# Patient Record
Sex: FEMALE | Race: BLACK OR AFRICAN AMERICAN | NOT HISPANIC OR LATINO | Employment: OTHER | URBAN - METROPOLITAN AREA
[De-identification: names, ages, dates, MRNs, and addresses within clinical notes are randomized per-mention and may not be internally consistent; named-entity substitution may affect disease eponyms.]

---

## 2017-11-06 DIAGNOSIS — M25.531 RIGHT WRIST PAIN: Primary | ICD-10-CM

## 2017-11-08 ENCOUNTER — HOSPITAL ENCOUNTER (OUTPATIENT)
Dept: RADIOLOGY | Facility: HOSPITAL | Age: 62
Discharge: HOME | End: 2017-11-08
Attending: PHYSICIAN ASSISTANT
Payer: MEDICARE

## 2017-11-08 ENCOUNTER — OFFICE VISIT (OUTPATIENT)
Dept: ORTHOPEDICS | Facility: CLINIC | Age: 62
End: 2017-11-08
Payer: MEDICARE

## 2017-11-08 VITALS
BODY MASS INDEX: 26.51 KG/M2 | DIASTOLIC BLOOD PRESSURE: 80 MMHG | HEIGHT: 67 IN | WEIGHT: 168.88 LBS | RESPIRATION RATE: 14 BRPM | SYSTOLIC BLOOD PRESSURE: 114 MMHG | HEART RATE: 72 BPM

## 2017-11-08 DIAGNOSIS — M79.641 RIGHT HAND PAIN: ICD-10-CM

## 2017-11-08 DIAGNOSIS — M77.11 RIGHT LATERAL EPICONDYLITIS: ICD-10-CM

## 2017-11-08 DIAGNOSIS — M19.041 ARTHRITIS OF RIGHT HAND: ICD-10-CM

## 2017-11-08 DIAGNOSIS — M25.531 RIGHT WRIST PAIN: ICD-10-CM

## 2017-11-08 DIAGNOSIS — M77.11 RIGHT LATERAL EPICONDYLITIS: Primary | ICD-10-CM

## 2017-11-08 PROCEDURE — 99999 PR PBB SHADOW E&M-EST. PATIENT-LVL V: CPT | Mod: PBBFAC,,, | Performed by: PHYSICIAN ASSISTANT

## 2017-11-08 PROCEDURE — 73110 X-RAY EXAM OF WRIST: CPT | Mod: TC,PO,RT

## 2017-11-08 PROCEDURE — 99203 OFFICE O/P NEW LOW 30 MIN: CPT | Mod: S$GLB,,, | Performed by: PHYSICIAN ASSISTANT

## 2017-11-08 PROCEDURE — 73110 X-RAY EXAM OF WRIST: CPT | Mod: 26,RT,, | Performed by: RADIOLOGY

## 2017-11-08 RX ORDER — OXYCODONE AND ACETAMINOPHEN 5; 325 MG/1; MG/1
1 TABLET ORAL 2 TIMES DAILY
Refills: 0 | COMMUNITY
Start: 2017-11-02

## 2017-11-08 RX ORDER — DICLOFENAC POTASSIUM 25 MG/1
25 CAPSULE, LIQUID FILLED ORAL
COMMUNITY
End: 2019-08-29

## 2017-11-08 RX ORDER — FLUTICASONE PROPIONATE 50 MCG
2 SPRAY, SUSPENSION (ML) NASAL
COMMUNITY
Start: 2017-02-11 | End: 2018-02-11

## 2017-11-08 RX ORDER — MELOXICAM 15 MG/1
15 TABLET ORAL DAILY
Refills: 0 | COMMUNITY
Start: 2017-09-27

## 2017-11-08 RX ORDER — GABAPENTIN 300 MG/1
300 CAPSULE ORAL NIGHTLY PRN
COMMUNITY

## 2017-11-08 RX ORDER — MONTELUKAST SODIUM 10 MG/1
10 TABLET ORAL DAILY PRN
Refills: 0 | COMMUNITY
Start: 2017-09-27 | End: 2020-09-15

## 2017-11-08 RX ORDER — LISINOPRIL AND HYDROCHLOROTHIAZIDE 20; 25 MG/1; MG/1
1 TABLET ORAL
COMMUNITY
End: 2018-09-06 | Stop reason: SDUPTHER

## 2017-11-08 RX ORDER — ERGOCALCIFEROL 1.25 MG/1
50000 CAPSULE ORAL
COMMUNITY

## 2017-11-08 RX ORDER — CITALOPRAM 20 MG/1
20 TABLET, FILM COATED ORAL
COMMUNITY
End: 2019-08-29

## 2017-11-08 RX ORDER — BENZONATATE 200 MG/1
200 CAPSULE ORAL
COMMUNITY
End: 2018-09-12

## 2017-11-08 RX ORDER — METRONIDAZOLE 500 MG/1
500 TABLET ORAL 2 TIMES DAILY
Refills: 0 | COMMUNITY
Start: 2017-11-01 | End: 2018-09-06

## 2017-11-08 RX ORDER — IBUPROFEN 800 MG/1
800 TABLET ORAL
COMMUNITY
End: 2019-08-29

## 2017-11-08 RX ORDER — CYCLOBENZAPRINE HCL 10 MG
10 TABLET ORAL 3 TIMES DAILY PRN
COMMUNITY

## 2017-11-08 NOTE — LETTER
November 9, 2017      Harpreet Jones MD  [address] [facility]  [facility]  [place] [address]           [facility]  [address] [facility]  [place] [address]  Phone: [phone]  Fax: [phone]          Patient: Alea Terrazas   MR Number: [id number]   YOB: 1955   Date of Visit: 11/8/2017       Dear Dr. Harpreet Jones:    Thank you for referring Alea Terrazas to me for evaluation. Attached you will find relevant portions of my assessment and plan of care.    If you have questions, please do not hesitate to call me. I look forward to following Alea Terrazas along with you.    Sincerely,    Angie Duong PA-C    Enclosure  CC:  No Recipients    If you would like to receive this communication electronically, please contact [email or web address] or [phone] to request more information on [employer] access.    For providers and/or their staff who would like to refer a patient to [facility], please contact us through our one-stop-shop provider referral line, [facility], at [phone].    If you feel you have received this communication in error or would no longer like to receive these types of communications, please e-mail [email or web address]

## 2017-11-08 NOTE — PATIENT INSTRUCTIONS
Understanding Lateral Epicondylitis    Tendons are strong bands of tissue that connect muscles to bones. Lateral epicondylitis affects the tendons that connect muscles in the forearm to the lateral epicondyle. This is the bony knob on the outer side of the elbow. The condition occurs if the extensor tendons of the wrist become red and swollen (irritated). This can cause pain in the elbow, forearm, and wrist. Because the condition is sometimes caused by playing tennis, it is also known as tennis elbow.  How to say it  [id number] [id number]   What causes lateral epicondylitis?  The condition most often occurs because of overuse. This can be from any activity that repeatedly puts stress on the forearm extensor muscles or tendons and wrist. For instance, playing tennis, lifting weights, cutting meat, painting, and typing can all cause the condition. Wear and tear of the tendons from aging or an injury to the tendons can also cause the condition.  Symptoms of lateral epicondylitis  The most common symptom is pain. You may feel it on the outer side of the elbow and down the back of the forearm. It may be worse when moving or using the elbow, forearm, or wrist. You may also feel pain when gripping or lifting things.  Treatment for lateral epicondylitis  Treatments may include:  · Resting the elbow, forearm, and wrist. Youll need to avoid movements that can make your symptoms worse. You also may need to avoid certain sports and types of work for a time. This helps relieve symptoms and prevent further damage to the tendons.  · Changing the action that caused the problem. For instance, if the tendons were damaged from playing tennis, it may help to change your playing technique or use different equipment. This helps prevent further damage to the tendons.  · Using cold packs. Putting an ice pack on the injured area can help reduce pain and swelling.  · Taking pain medicines. Taking prescription or  over-the-counter pain medicines may help reduce pain and swelling.    · Wearing a brace. This helps reduce strain on the muscles and tendons in the forearm, which may relieve symptoms. It is very important to wear the brace properly.  · Doing exercises and physical therapy. These help improve strength and range of motion in the elbow, forearm, and wrist.  · Getting shots of medicine into the injured area. These may help relieve symptoms for a time.  · Having surgery. This may be an option if other treatments fail to relieve symptoms. In many cases, the surgeon removes the damaged tissue.  Possible complications of lateral epicondylitis  If the tendons involved dont heal properly, symptoms may return or get worse. To help prevent this, follow your treatment plan as directed.  When to call your healthcare provider  Call your healthcare provider right away if you have any of these:  · Fever of 100.4°F (38°C) or higher, or as directed  · Redness, swelling, or warmth in the elbow or forearm that gets worse  · Symptoms that dont get better with treatment, or get worse  · New symptoms   Date Last Reviewed: 3/10/2016  © 0359-9417 Issue. [address] [place] [address]. All rights reserved. This information is not intended as a substitute for professional medical care. Always follow your healthcare professional's instructions.        Wrist Flexion (Strength)     This exercise is written for your right elbow. Switch sides for your left elbow.  1. Sit in a chair next to a table. Rest your right forearm on the table. Hang your right wrist off the edge of the table, palm up. Hold a hand weight in your right hand. Your healthcare provider will tell you what size of hand weight to use.  2. Keep your forearm in place and bend your wrist upward to lift the weight. Hold for 5 seconds.  3. Slowly lower the hand weight back down.  4. Repeat 5 times, or as instructed.  Date Last Reviewed: 3/10/2016  ©  0284-1792 [employer]. [address] [place] [address]. All rights reserved. This information is not intended as a substitute for professional medical care. Always follow your healthcare professional's instructions.        Wrist Flexion (Flexibility)    5. Stand or sit and hold your arms straight out in front of you.  6. Dangle your right hand at the wrist. Gently press down on your right hand with your left hand. Feel the stretch in your right wrist and the top of your hand.  7. Hold for 30 to 60 seconds, then relax.  8. Switch arms and repeat 2 times.   Date Last Reviewed: 3/10/2016  © 1788-8132 [employer]. [address] [place] [address]. All rights reserved. This information is not intended as a substitute for professional medical care. Always follow your healthcare professional's instructions.        Treating Tennis Elbow    Your treatment will depend on how inflamed your tendon is. The goal is to relieve your symptoms and help you regain full use of your elbow.  Rest and medicine  Wearing a tennis elbow splint allows the inflamed tendon to rest. It must be worn properly. It should be placed down the arm past the painful area of the elbow. If it is directly over the inflamed tendon, it can worsen the symptoms. This brace can help the tendon heal. Using your other hand or changing your  also takes stress off the tendon. Oral nonsteroidal anti-inflammatory medicines (NSAIDs) and/or ice can relieve pain and reduce swelling.  Exercises and therapy  Your healthcare provider may give you an exercise program. He or she may refer you to a therapist. The therapist will teach you to gently stretch and then strengthen the muscles around your elbow.  Anti-inflammatory injections  Your healthcare provider may give you injections of an anti-inflammatory, such as cortisone. This helps reduce swelling. You may have more pain at first. But in a few days, your elbow should  feel better.  If surgery is needed  If your symptoms persist for a long time, or other treatments dont work, your healthcare provider may recommend surgery. Surgery repairs the inflamed tendon.   Date Last Reviewed: 9/26/2015  © 1950-7727 [employer]. [address] [place] [address]. All rights reserved. This information is not intended as a substitute for professional medical care. Always follow your healthcare professional's instructions.

## 2017-11-08 NOTE — PROGRESS NOTES
Subjective:      Patient ID: Alea Terrazas is a 61 y.o. female.    Chief Complaint: Pain of the Right Wrist      HPI: Alea Terrazas  is a 61 y.o. female who c/o Pain of the Right Wrist   for duration of 6 months.  She denies an inciting injury.  She denies numbness and tingling.  She actually points the first CMC joint as well as the left lateral upper condyle as to where her pain is located.  Pain level today is 2 out of 10.  Quality is aching, throbbing, sharp, burning, shooting, and stinging.  It is intermittent.  She also complains of occasional pain in her PIP joints throughout the right hand.  She is unsure whether or not she has a history of rheumatoid arthritis in her family.  Alleviating factors include rest and a heating pad.  Aggravating factors include pushing down on the hand as well as making a full fist.    Review of Systems   Constitution: Negative for fever.   Cardiovascular: Negative for chest pain.   Respiratory: Negative for cough and shortness of breath.    Skin: Negative for rash.   Musculoskeletal: Positive for joint pain, joint swelling and stiffness.   Gastrointestinal: Negative for heartburn.   Neurological: Negative for headaches and numbness.         Objective:        General    Nursing note and vitals reviewed.  Constitutional: She is oriented to person, place, and time. She appears well-developed and well-nourished.   HENT:   Head: Normocephalic and atraumatic.   Eyes: EOM are normal.   Cardiovascular: Normal rate and regular rhythm.    Pulmonary/Chest: Effort normal.   Abdominal: Soft.   Neurological: She is alert and oriented to person, place, and time.   Psychiatric: She has a normal mood and affect. Her behavior is normal.             Right Hand/Wrist Exam     Inspection   Scars: Wrist - absent Hand -  absent  Effusion: Wrist - absent Hand -  absent  Bruising: Wrist - absent Hand -  absent  Deformity: Wrist - deformity Hand -  deformity    Pain   Wrist - The patient exhibits pain of  the CMC.    Range of Motion     Wrist   Extension: normal   Flexion: normal   Pronation: normal   Supination: normal     Tests   Tinels Sign (Medial Nerve): negative    Atrophy   Thenar:  negative  Hypothenar:  negative  Intrinsic:  negative  1st Dorsal Interosseous: negative    Other     Neuorologic Exam    Median Distribution: normal  Ulnar Distribution: normal  Radial Distribution: normal    Comments:  TTP 1st CMC with pain on rotary compression at that joint.  Flexion and extension are intact and full to all MCP, PIP, and DIP joints.      Right Elbow Exam     Inspection   Scars: absent  Effusion: absent  Bruising: absent  Deformity: absent  Atrophy: absent    Pain   The patient exhibits pain of the lateral epicondyle    Range of Motion   Extension: normal   Flexion: normal   Pronation: normal   Supination: normal     Tests Tinel's Sign (cubital tunnel): negative          Muscle Strength   Right Upper Extremity   Wrist Extension: 5/5/5   Wrist Flexion: 5/5/5   : 4/5/5   Thumb - APB: 5/5  Pinch Mechanism: 5/5  Elbow Pronation:  5/5   Elbow Supination:  5/5   Elbow Extension: 5/5  Elbow Flexion: 5/5  Left Upper Extremity  Wrist Extension: 5/5/5   Wrist Flexion: 5/5/5   :  5/5/5   Elbow Pronation:  5/5   Elbow Supination:  5/5   Elbow Extension: 5/5  Elbow Flexion: 5/5    Vascular Exam     Right Pulses      Radial:                    2+      Capillary Refill  Right Hand: normal capillary refill            Xray:   Right wrist from today images and report were reviewed today.  I agree with the radiologist's interpretation.  There is no radiographic evidence of acute osseous, articular, or soft tissue abnormality. There mild degenerative changes present at the 1st CMC and MCP joints. Lunotriquetral coalition noted.  There is mild negative ulnar variance.  Carpal alignment is otherwise within normal limits. No erosive osseous changes demonstrated.    Assessment:       Encounter Diagnoses   Name Primary?     Right lateral epicondylitis Yes    Arthritis of right hand     Right hand pain           Plan:       Alea was seen today for pain.    Diagnoses and all orders for this visit:    Right lateral epicondylitis  -     X-Ray Elbow Complete Right; Future  -     Ambulatory Referral to Physical/Occupational Therapy    Arthritis of right hand  -     X-Ray Hand Complete Right; Future  -     MARTHA; Future  -     CBC auto differential; Future  -     Comprehensive metabolic panel; Future  -     C-reactive protein; Future  -     Cyclic citrul peptide antibody, IgG; Future  -     Uric acid; Future  -     Sedimentation rate, manual; Future  -     Rheumatoid factor; Future    Right hand pain  -     X-Ray Hand Complete Right; Future  -     MARTHA; Future  -     CBC auto differential; Future  -     Comprehensive metabolic panel; Future  -     C-reactive protein; Future  -     Cyclic citrul peptide antibody, IgG; Future  -     Uric acid; Future  -     Sedimentation rate, manual; Future  -     Rheumatoid factor; Future    Ms. Cosby comes in today for evaluation of new problems as above.  She is really having much wrist pain.  I think she her problem is more the mild arthritis in the first CMC joint.  I have put her into a thumb spica splint to help alleviate the symptoms.  She can also work on warm water soaks 4 times a day for 5-10 minutes at a time.  She'll be careful not to use water so hot [facility] called her.  I also recommend a topical anti-inflammatory cream.  I will [facility] that order to [facility].  She has a history of a gastric ulcers so oral anti-inflammatories contraindicated.  I would like to get x-rays of her elbow and hand today.  I have also given her an order to begin physical therapy for the lateral epicondylitis on the right elbow.  I will see her back in the office in about a month.  If she has problems before then, she will notify the office.  Additionally, I would like her to do some blood work just to  rule out an inflammatory arthropathy as she has multiple joint complaints on the right hand.  Patient verbalizes understanding and agrees with the above plan.    Return in about 1 month (around 12/8/2017).    The patient understands, chooses and consents to this plan and accepts all   the risks which include but are not limited to the risks mentioned above.     Disclaimer: This note was prepared using a voice recognition system and is likely to have sound alike errors within the text.

## 2017-12-08 ENCOUNTER — DOCUMENTATION ONLY (OUTPATIENT)
Dept: ORTHOPEDICS | Facility: CLINIC | Age: 62
End: 2017-12-08

## 2017-12-08 NOTE — PROGRESS NOTES
Patient did not obtain the topical anti-inflammatory [facility].  They were unable to contact her.

## 2018-01-08 ENCOUNTER — HOSPITAL ENCOUNTER (OUTPATIENT)
Dept: RADIOLOGY | Facility: HOSPITAL | Age: 63
Discharge: HOME | End: 2018-01-08
Attending: PHYSICIAN ASSISTANT
Payer: MEDICARE

## 2018-01-08 PROCEDURE — 73080 X-RAY EXAM OF ELBOW: CPT | Mod: TC,FY,PO,RT

## 2018-01-08 PROCEDURE — 73130 X-RAY EXAM OF HAND: CPT | Mod: 26,RT,, | Performed by: RADIOLOGY

## 2018-01-08 PROCEDURE — 73130 X-RAY EXAM OF HAND: CPT | Mod: TC,FY,PO,RT

## 2018-01-08 PROCEDURE — 73080 X-RAY EXAM OF ELBOW: CPT | Mod: 26,RT,, | Performed by: RADIOLOGY

## 2018-01-10 ENCOUNTER — OFFICE VISIT (OUTPATIENT)
Dept: ORTHOPEDICS | Facility: CLINIC | Age: 63
End: 2018-01-10
Payer: MEDICARE

## 2018-01-10 VITALS
HEART RATE: 67 BPM | HEIGHT: 67 IN | WEIGHT: 168.44 LBS | BODY MASS INDEX: 26.44 KG/M2 | DIASTOLIC BLOOD PRESSURE: 86 MMHG | RESPIRATION RATE: 14 BRPM | SYSTOLIC BLOOD PRESSURE: 130 MMHG

## 2018-01-10 DIAGNOSIS — M79.641 RIGHT HAND PAIN: ICD-10-CM

## 2018-01-10 DIAGNOSIS — M77.11 RIGHT LATERAL EPICONDYLITIS: Primary | ICD-10-CM

## 2018-01-10 DIAGNOSIS — M19.041 ARTHRITIS OF RIGHT HAND: ICD-10-CM

## 2018-01-10 PROCEDURE — 99213 OFFICE O/P EST LOW 20 MIN: CPT | Mod: S$GLB,,, | Performed by: PHYSICIAN ASSISTANT

## 2018-01-10 PROCEDURE — 99999 PR PBB SHADOW E&M-EST. PATIENT-LVL IV: CPT | Mod: PBBFAC,,, | Performed by: PHYSICIAN ASSISTANT

## 2018-01-10 RX ORDER — LORATADINE 10 MG/1
10 TABLET ORAL DAILY
Refills: 0 | COMMUNITY
Start: 2017-12-19 | End: 2020-09-15

## 2018-01-10 RX ORDER — LISINOPRIL AND HYDROCHLOROTHIAZIDE 12.5; 2 MG/1; MG/1
TABLET ORAL
Refills: 0 | COMMUNITY
Start: 2017-12-08 | End: 2019-08-29

## 2018-01-10 RX ORDER — CETIRIZINE HYDROCHLORIDE 10 MG/1
10 TABLET ORAL DAILY
Refills: 0 | COMMUNITY
Start: 2017-12-08

## 2018-01-11 NOTE — PROGRESS NOTES
Patient ID: Alea Terrazas is a 62 y.o. female.    Chief Complaint: Follow-up of the Right Elbow and Follow-up of the Right Hand      HPI: Alea Terrazas  is a 62 y.o. female who c/o Follow-up of the Right Elbow and Follow-up of the Right Hand   for duration of more than 6 months.  At her last office visit, I asked her to get x-rays of her wrist and elbow.  I also asked for blood work to rule out an inflammatory arthritis.  Did do x-rays today.  She has not done the blood work.  She has been doing physical therapy, but states that makes her pain worse.  Overall her pain level today is 0 out of 10.  It is improved with warm water soaks in the splint.  Already of pain is an occasional aching pain.  Worse over the first CMC joint.        Objective:        General    Nursing note and vitals reviewed.  Constitutional: She is oriented to person, place, and time. She appears well-developed and well-nourished.   HENT:   Head: Normocephalic and atraumatic.   Eyes: EOM are normal.   Cardiovascular: Normal rate and regular rhythm.    Pulmonary/Chest: Effort normal.   Abdominal: Soft.   Neurological: She is alert and oriented to person, place, and time.   Psychiatric: She has a normal mood and affect. Her behavior is normal.           Right Hand/Wrist Exam      Inspection   Scars: Wrist - absent Hand -  absent  Effusion: Wrist - absent Hand -  absent  Bruising: Wrist - absent Hand -  absent  Deformity: Wrist - deformity Hand -  deformity     Pain   Wrist - The patient exhibits pain of the CMC.     Range of Motion      Wrist   Extension: normal   Flexion: normal   Pronation: normal   Supination: normal      Tests   Tinels Sign (Medial Nerve): negative     Atrophy   Thenar:  negative  Hypothenar:  negative  Intrinsic:  negative  1st Dorsal Interosseous: negative     Other      Neuorologic Exam    Median Distribution: normal  Ulnar Distribution: normal  Radial Distribution: normal     Comments: No TTP 1st CMC with pain on rotary  compression at that joint.  Flexion and extension are intact and full to all MCP, PIP, and DIP joints.        Right Elbow Exam      Inspection   Scars: absent  Effusion: absent  Bruising: absent  Deformity: absent  Atrophy: absent     Pain   The patient exhibits pain of the lateral epicondyle     Range of Motion   Extension: normal   Flexion: normal   Pronation: normal   Supination: normal      Tests Tinel's Sign (cubital tunnel): negative           Muscle Strength   Right Upper Extremity   Wrist Extension: 5/5/5   Wrist Flexion: 5/5/5   : 4/5/5   Thumb - APB: 5/5  Pinch Mechanism: 5/5  Elbow Pronation:  5/5   Elbow Supination:  5/5   Elbow Extension: 5/5  Elbow Flexion: 5/5  Left Upper Extremity  Wrist Extension: 5/5/5   Wrist Flexion: 5/5/5   :  5/5/5   Elbow Pronation:  5/5   Elbow Supination:  5/5   Elbow Extension: 5/5  Elbow Flexion: 5/5     Vascular Exam      Right Pulses        Radial:                    2+        Capillary Refill  Right Hand: normal capillary refill          Xray:   Right hand from today images and report were reviewed today.  I agree with the radiologist's interpretation.  There is minimal multiarticular degenerative change.  No periarticular erosions.  No acute fracture or dislocation.  Right elbow from today images and report were reviewed today.  I agree with the radiologist's interpretation.  The joint spaces are preserved.  No joint effusion.  No acute fracture or dislocation.    Assessment:       Encounter Diagnoses   Name Primary?    Right lateral epicondylitis Yes    Arthritis of right hand     Right hand pain           Plan:       Alea was seen today for follow-up and follow-up.    Diagnoses and all orders for this visit:    Right lateral epicondylitis    Arthritis of right hand    Right hand pain    Ms. Cosby comes in today for follow-up.  She is feeling better.  She can hold off doing the inflammatory arthritis workup for now.  If symptoms return or worsen, we will  reconsider doing blood work.  Her x-rays today look good.  She can continue with warm water soaks as needed.  She'll continue with the hand brace as well.  Should the tennis elbow or arthritis in the hand worsen, we could also consider injections down the line.  She verbalizes understanding and agrees with the above plan.  Follow-up if symptoms worsen or fail to improve.          The patient understands, chooses and consents to this plan and accepts all   the risks which include but are not limited to the risks mentioned above.     Disclaimer: This note was prepared using a voice recognition system and is likely to have sound alike errors within the text.

## 2018-03-12 ENCOUNTER — TELEPHONE (OUTPATIENT)
Dept: SMOKING CESSATION | Facility: CLINIC | Age: 63
End: 2018-03-12

## 2018-03-12 NOTE — TELEPHONE ENCOUNTER
Attempt to contact patient for the smoking cessation program. Recorded message left with return contact information.

## 2018-03-23 ENCOUNTER — CLINICAL SUPPORT (OUTPATIENT)
Dept: SMOKING CESSATION | Facility: CLINIC | Age: 63
End: 2018-03-23
Payer: COMMERCIAL

## 2018-03-23 DIAGNOSIS — F17.200 NICOTINE DEPENDENCE: Primary | ICD-10-CM

## 2018-03-23 PROCEDURE — 99404 PREV MED CNSL INDIV APPRX 60: CPT | Mod: S$GLB,,, | Performed by: GENERAL PRACTICE

## 2018-03-23 RX ORDER — IBUPROFEN 200 MG
1 TABLET ORAL DAILY
Qty: 14 PATCH | Refills: 1 | Status: SHIPPED | OUTPATIENT
Start: 2018-03-23 | End: 2018-05-04 | Stop reason: ALTCHOICE

## 2018-04-03 ENCOUNTER — CLINICAL SUPPORT (OUTPATIENT)
Dept: SMOKING CESSATION | Facility: CLINIC | Age: 63
End: 2018-04-03
Payer: COMMERCIAL

## 2018-04-03 DIAGNOSIS — F17.200 NICOTINE DEPENDENCE: Primary | ICD-10-CM

## 2018-04-03 PROCEDURE — 99407 BEHAV CHNG SMOKING > 10 MIN: CPT | Mod: S$GLB,,, | Performed by: GENERAL PRACTICE

## 2018-04-18 ENCOUNTER — CLINICAL SUPPORT (OUTPATIENT)
Dept: SMOKING CESSATION | Facility: CLINIC | Age: 63
End: 2018-04-18
Payer: COMMERCIAL

## 2018-04-18 DIAGNOSIS — F17.200 NICOTINE DEPENDENCE: Primary | ICD-10-CM

## 2018-04-18 PROCEDURE — 99404 PREV MED CNSL INDIV APPRX 60: CPT | Mod: S$GLB,,, | Performed by: GENERAL PRACTICE

## 2018-04-18 NOTE — PROGRESS NOTES
Individual Follow-Up Form    4/18/2018    Clinical Status of Patient: Outpatient    Length of Service: 60 minutes    Continuing Medication: yes  Patches     Target Symptoms: Withdrawal and medication side effects. The following were  rated moderate (3) to severe (4) on TCRS:  · Moderate (3): depressed, anxious, sleep disturbances, desire tobacco  · Severe (4): none    Comments: Patient was seen today for a smoking cessation progress update. She states that she has not been able to reach her target quit date as previously discussed. She appears to be very emotional and crying stating that she has been going through so much these past 2 weeks with the death of her friends and other personal issues going on in her life. She stated that she is smoking 1/2 ppd and has been unable to reduce that amount on her own. We discussed the use of the nicotine patch. She stated that she has the patches but was trying to quit without using any NRT products. She inquired about using Wellbutrin but stated that she had seizures when she was a child. We discussed the risk of seizures when using Wellbutrin. She verbalized understanding and stated that she will use the nicotine patches as discussed. We discussed setting daily goals and challenges. We discussed stress management and relaxation techniques. She verbalized understanding. We discussed talking with a mental health counselor or grief counselor. She stated that she is working through things on her own and will seek resources if needed. Will continue to encourage and monitor progress.    Diagnosis: F17.200    Next Visit: 2 weeks

## 2018-04-26 ENCOUNTER — CLINICAL SUPPORT (OUTPATIENT)
Dept: SMOKING CESSATION | Facility: CLINIC | Age: 63
End: 2018-04-26
Payer: COMMERCIAL

## 2018-04-26 DIAGNOSIS — F17.200 NICOTINE DEPENDENCE: Primary | ICD-10-CM

## 2018-04-26 PROCEDURE — 99407 BEHAV CHNG SMOKING > 10 MIN: CPT | Mod: S$GLB,,, | Performed by: GENERAL PRACTICE

## 2018-05-04 ENCOUNTER — CLINICAL SUPPORT (OUTPATIENT)
Dept: SMOKING CESSATION | Facility: CLINIC | Age: 63
End: 2018-05-04
Payer: COMMERCIAL

## 2018-05-04 DIAGNOSIS — F17.200 NICOTINE DEPENDENCE: Primary | ICD-10-CM

## 2018-05-04 PROCEDURE — 99404 PREV MED CNSL INDIV APPRX 60: CPT | Mod: S$GLB,,, | Performed by: GENERAL PRACTICE

## 2018-05-04 RX ORDER — MICONAZOLE NITRATE 2 %
2 CREAM (GRAM) TOPICAL
Qty: 170 EACH | Refills: 1 | Status: SHIPPED | OUTPATIENT
Start: 2018-05-04 | End: 2018-10-16 | Stop reason: ALTCHOICE

## 2018-05-04 RX ORDER — NICOTINE 7MG/24HR
1 PATCH, TRANSDERMAL 24 HOURS TRANSDERMAL DAILY
Qty: 14 PATCH | Refills: 1 | Status: SHIPPED | OUTPATIENT
Start: 2018-05-04 | End: 2018-10-16 | Stop reason: ALTCHOICE

## 2018-05-04 NOTE — PROGRESS NOTES
Individual Follow-Up Form    5/4/2018    Clinical Status of Patient: Outpatient    Length of Service: 60 minutes    Continuing Medication: yes  Patches    Other Medications: nicotine gum prn     Target Symptoms: Withdrawal and medication side effects. The following were  rated moderate (3) to severe (4) on TCRS:  · Moderate (3): irritable, restless, desire tobacco  · Severe (4): none    Comments: Patient was seen today for a smoking cessation progress update. She stated that she had a slip yesterday when she went to [place] to visit her dad in the hospital. She stated that she has been going through a lot of emotional changes and difficult situations with the care of her father and with her brother. She stated that she has been crying for several days but is having a much better day today. She has run out of patches and feels that because she was not wearing a nicotine patch yesterday she was unable to refrian from the urge to smoke. She did not purchase any cigarettes but borrowed 2 cigarettes from a family member. She stated that she felt guilty after smoking and was disappointed in her reaction. We discussed high risk situations, stress management  And coping strategies. She verbalized understanding. We discussed her nicotine patches and the use of nicotine gum as needed for those breakthrough cravings to smoke. She verbalized understanding. She denies any negative thoughts or behavior at this time. Will continue to encourage and monitor progress.    Diagnosis: F17.200    Next Visit: 2 weeks

## 2018-06-27 ENCOUNTER — TELEPHONE (OUTPATIENT)
Dept: SMOKING CESSATION | Facility: CLINIC | Age: 63
End: 2018-06-27

## 2018-08-13 ENCOUNTER — TELEPHONE (OUTPATIENT)
Dept: SMOKING CESSATION | Facility: CLINIC | Age: 63
End: 2018-08-13

## 2018-09-06 ENCOUNTER — HOSPITAL ENCOUNTER (OUTPATIENT)
Dept: RADIOLOGY | Facility: HOSPITAL | Age: 63
Discharge: HOME | End: 2018-09-06
Attending: PHYSICIAN ASSISTANT
Payer: MEDICARE

## 2018-09-06 ENCOUNTER — OFFICE VISIT (OUTPATIENT)
Dept: ORTHOPEDICS | Facility: CLINIC | Age: 63
End: 2018-09-06
Payer: MEDICARE

## 2018-09-06 ENCOUNTER — TELEPHONE (OUTPATIENT)
Dept: ORTHOPEDICS | Facility: CLINIC | Age: 63
End: 2018-09-06

## 2018-09-06 VITALS
RESPIRATION RATE: 12 BRPM | BODY MASS INDEX: 27.62 KG/M2 | SYSTOLIC BLOOD PRESSURE: 131 MMHG | DIASTOLIC BLOOD PRESSURE: 89 MMHG | WEIGHT: 176 LBS | HEART RATE: 61 BPM | HEIGHT: 67 IN

## 2018-09-06 DIAGNOSIS — M19.041 ARTHRITIS OF RIGHT HAND: Primary | ICD-10-CM

## 2018-09-06 DIAGNOSIS — M79.642 LEFT HAND PAIN: ICD-10-CM

## 2018-09-06 DIAGNOSIS — M79.641 RIGHT HAND PAIN: ICD-10-CM

## 2018-09-06 DIAGNOSIS — M65.311 TRIGGER THUMB OF RIGHT HAND: ICD-10-CM

## 2018-09-06 DIAGNOSIS — R20.0 BILATERAL HAND NUMBNESS: ICD-10-CM

## 2018-09-06 PROCEDURE — 99214 OFFICE O/P EST MOD 30 MIN: CPT | Mod: PBBFAC,25,PO | Performed by: PHYSICIAN ASSISTANT

## 2018-09-06 PROCEDURE — 20550 NJX 1 TENDON SHEATH/LIGAMENT: CPT | Mod: S$PBB,F5,, | Performed by: PHYSICIAN ASSISTANT

## 2018-09-06 PROCEDURE — 3008F BODY MASS INDEX DOCD: CPT | Mod: CPTII,,, | Performed by: PHYSICIAN ASSISTANT

## 2018-09-06 PROCEDURE — 73130 X-RAY EXAM OF HAND: CPT | Mod: 26,LT,, | Performed by: RADIOLOGY

## 2018-09-06 PROCEDURE — 20550 NJX 1 TENDON SHEATH/LIGAMENT: CPT | Mod: PBBFAC,PO | Performed by: PHYSICIAN ASSISTANT

## 2018-09-06 PROCEDURE — 73130 X-RAY EXAM OF HAND: CPT | Mod: TC,FY,PO,LT

## 2018-09-06 PROCEDURE — 99214 OFFICE O/P EST MOD 30 MIN: CPT | Mod: 25,S$PBB,, | Performed by: PHYSICIAN ASSISTANT

## 2018-09-06 PROCEDURE — 99999 PR PBB SHADOW E&M-EST. PATIENT-LVL IV: CPT | Mod: PBBFAC,,, | Performed by: PHYSICIAN ASSISTANT

## 2018-09-06 RX ORDER — CLONIDINE HYDROCHLORIDE 0.1 MG/1
TABLET ORAL
Refills: 0 | COMMUNITY
Start: 2018-07-13 | End: 2019-08-29

## 2018-09-06 RX ORDER — MUPIROCIN 20 MG/G
OINTMENT TOPICAL
Refills: 0 | COMMUNITY
Start: 2018-08-21 | End: 2019-08-29

## 2018-09-06 RX ORDER — METHYLPREDNISOLONE ACETATE 40 MG/ML
40 INJECTION, SUSPENSION INTRA-ARTICULAR; INTRALESIONAL; INTRAMUSCULAR; SOFT TISSUE ONCE
Status: COMPLETED | OUTPATIENT
Start: 2018-09-06 | End: 2018-09-06

## 2018-09-06 RX ORDER — DEXTROMETHORPHAN HYDROBROMIDE, GUAIFENESIN AND PHENYLEPHRINE HYDROCHLORIDE 15; 400; 10 MG/1; MG/1; MG/1
1 TABLET ORAL EVERY 6 HOURS PRN
Refills: 0 | COMMUNITY
Start: 2018-06-29

## 2018-09-06 RX ORDER — FLUTICASONE PROPIONATE 50 MCG
2 SPRAY, SUSPENSION (ML) NASAL DAILY
Refills: 0 | COMMUNITY
Start: 2018-06-29

## 2018-09-06 RX ADMIN — METHYLPREDNISOLONE ACETATE 40 MG: 40 INJECTION, SUSPENSION INTRALESIONAL; INTRAMUSCULAR; INTRASYNOVIAL; SOFT TISSUE at 03:09

## 2018-09-06 NOTE — TELEPHONE ENCOUNTER
----- Message from Angie Duong PA-C sent at 9/6/2018  2:51 PM CDT -----  Please let her know the left hand shows no acute bony abnormalities.  She has very minimal arthritis at the base of her left thumb.

## 2018-09-06 NOTE — PROGRESS NOTES
Patient ID: Alea Terrazas is a 62 y.o. female.    Chief Complaint: Pain and Follow-up of the Right Hand and Pain and Follow-up of the Left Hand      HPI: Alea Terrazas  is a 62 y.o. female who c/o Pain and Follow-up of the Right Hand and Pain and Follow-up of the Left Hand   for duration of   Going on a year now.  She complains of pain at the base of the right hand.  She now has new onset of pain over the last couple of months to the base of the left thumb.  The pain over the elbow has improved somewhat since her last office visit with me in January.  Pain level presently is 0/10 in severity.  However it worsens any time she  things as well as 1st thing in the morning.  She now has new onset of associated numbness and tingling in the right hand greater than the left.  This has been going on for a couple of months.  Quality is throbbing, sharp pain which is intermittent.  Improved with rest and heating pad.  Aggravating as above.    Past Medical History:   Diagnosis Date    Arthritis     Carpal tunnel syndrome     Chronic neck and back pain     Hypertension      Past Surgical History:   Procedure Laterality Date    CARPAL TUNNEL RELEASE Bilateral     NECK SURGERY  2016, 2017    SHOULDER SURGERY Right      Family History   Problem Relation Age of Onset    Cancer Father     Diabetes Father      Social History     Socioeconomic History    Marital status:      Spouse name: Not on file    Number of children: Not on file    Years of education: Not on file    Highest education level: Not on file   Social Needs    Financial resource strain: Not on file    Food insecurity - worry: Not on file    Food insecurity - inability: Not on file    Transportation needs - medical: Not on file    Transportation needs - non-medical: Not on file   Occupational History    Not on file   Tobacco Use    Smoking status: Current Every Day Smoker     Packs/day: 0.50     Years: 41.00     Pack years: 20.50     Types:  Cigarettes    Smokeless tobacco: Never Used   Substance and Sexual Activity    Alcohol use: Yes     Comment: occasionally    Drug use: No    Sexual activity: Not on file   Other Topics Concern    Not on file   Social History Narrative    Not on file     Medication List with Changes/Refills   Current Medications    AQUANAZ 10- MG TAB    Take 1 tablet by mouth every 6 (six) hours.    BENZONATATE (TESSALON) 200 MG CAPSULE    Take 200 mg by mouth.    CETIRIZINE (ZYRTEC) 10 MG TABLET        CITALOPRAM (CELEXA) 20 MG TABLET    Take 20 mg by mouth.    CLONIDINE (CATAPRES) 0.1 MG TABLET        CYCLOBENZAPRINE (FLEXERIL) 10 MG TABLET    Take 10 mg by mouth.    DICLOFENAC POTASSIUM 25 MG CAP    Take 25 mg by mouth.    ERGOCALCIFEROL (ERGOCALCIFEROL) 50,000 UNIT CAP    Take 50,000 Units by mouth.    FLUTICASONE (FLONASE) 50 MCG/ACTUATION NASAL SPRAY    2 sprays by Each Nare route once daily.    GABAPENTIN (NEURONTIN) 300 MG CAPSULE    Take 300 mg by mouth.    IBUPROFEN (ADVIL,MOTRIN) 800 MG TABLET    Take 800 mg by mouth.    LISINOPRIL-HYDROCHLOROTHIAZIDE (PRINZIDE,ZESTORETIC) 20-12.5 MG PER TABLET        LORATADINE (CLARITIN) 10 MG TABLET    Take 10 mg by mouth once daily.    MELOXICAM (MOBIC) 15 MG TABLET    Take 15 mg by mouth once daily.    MONTELUKAST (SINGULAIR) 10 MG TABLET    Take 10 mg by mouth once daily.    MUPIROCIN (BACTROBAN) 2 % OINTMENT    ANABELL AA BID    NICOTINE (NICODERM CQ) 7 MG/24 HR    Place 1 patch onto the skin once daily.    NICOTINE, POLACRILEX, (NICORETTE) 2 MG GUM    Take 1 each (2 mg total) by mouth as needed.    OXYCODONE-ACETAMINOPHEN (PERCOCET) 5-325 MG PER TABLET    Take 1 tablet by mouth every 8 (eight) hours.   Discontinued Medications    LISINOPRIL-HYDROCHLOROTHIAZIDE (PRINZIDE,ZESTORETIC) 20-25 MG TAB    Take 1 tablet by mouth.    METRONIDAZOLE (FLAGYL) 500 MG TABLET    Take 500 mg by mouth 2 (two) times daily.     Review of patient's allergies indicates:  No Known Allergies         Objective:        General    Nursing note and vitals reviewed.  Constitutional: She is oriented to person, place, and time. She appears well-developed and well-nourished.   HENT:   Head: Normocephalic and atraumatic.   Eyes: EOM are normal.   Cardiovascular: Normal rate and regular rhythm.    Pulmonary/Chest: Effort normal.   Abdominal: Soft.   Neurological: She is alert and oriented to person, place, and time.   Psychiatric: She has a normal mood and affect. Her behavior is normal.             Right Hand/Wrist Exam     Inspection   Scars: Wrist - absent Hand -  absent  Effusion: Wrist - absent Hand -  absent  Bruising: Wrist - absent Hand -  absent  Deformity: Wrist - deformity Hand -  deformity    Range of Motion     Wrist   Extension: normal   Flexion: normal   Pronation: normal   Supination: normal     Tests   Phalens sign: positive  Tinel's sign (median nerve): positive  Finkelstein's test: negative  Carpal Tunnel Compression Test: positive  Cubital Tunnel Compression Test: negative    Atrophy   Thenar:  negative  Hypothenar:  negative  Intrinsic:  negative  1st Dorsal Interosseous: negative    Other     Neuorologic Exam    Median Distribution: normal  Ulnar Distribution: normal  Radial Distribution: normal    Comments:  2+ radial pulse  Mild TTP 1st CMC jt  No pain with rotatory compression 1st CMC jt  TTP A1-pulley thumb  No active triggering      Left Hand/Wrist Exam     Inspection   Scars: Wrist - absent Hand -  absent  Effusion: Wrist - absent Hand -  absent  Bruising: Wrist - absent Hand -  absent  Deformity: Wrist - absent Hand -  absent    Range of Motion     Wrist   Extension: normal   Flexion: normal   Pronation: normal   Supination: normal     Tests   Phalens Sign: negative  Tinel's sign (median nerve): positive  Finkelstein's test: negative    Atrophy  Thenar:  Negative  Hypothenar:  negative  Intrinsic: negative  1st Dorsal Interosseous:  negative    Other     Sensory Exam  Median Distribution:  normal  Ulnar Distribution: normal  Radial Distribution: normal    Comments:  2+ radial pulse  Mild TTP 1st CMC joint  No pain with rotatory compression 1st CMC jt      Right Elbow Exam     Tests   Tinel's sign (cubital tunnel): negative    Other   Sensation: normal    Comments:  Mild TTP over the lateral epicondyle - improved from last visit      Left Elbow Exam     Tests   Tinel's sign (cubital tunnel): negative        Muscle Strength   Right Upper Extremity   Wrist extension: 5/5/5   Wrist flexion: 5/5/5   : 4/5/5   Thumb - APB: 5/5  Pinch Mechanism: 5/5  Left Upper Extremity  Wrist extension: 5/5/5   Wrist flexion: 5/5/5   :  5/5/5   Thumb - APB: 5/5  Pinch Mechanism: 5/5    Vascular Exam     Right Pulses      Radial:                    2+      Capillary Refill  Right Hand: normal capillary refill  Left Hand: normal capillary refill              Assessment:       Encounter Diagnoses   Name Primary?    Arthritis of right hand Yes    Right hand pain     Trigger thumb of right hand     Bilateral hand numbness     Left hand pain           Plan:       Alea was seen today for pain, follow-up, pain and follow-up.    Diagnoses and all orders for this visit:    Arthritis of right hand    Right hand pain    Trigger thumb of right hand  -     methylPREDNISolone acetate injection 40 mg; 1 mL (40 mg total) by Intra-Lesional route once.    Bilateral hand numbness  -     Nerve conduction test; Future    Left hand pain  -     X-Ray Hand 3 view Left; Future    Ms. Cosby comes in today   For new problem in the left hand as well as a follow-up on the right hand.  She actually has a new problem in the right hand as well.  She has mild CMC joint arthritis in the right thumb.  She also has what I suspect is an early trigger finger.  She is not actively triggering, but has exquisite tenderness to palpation over the A1 pulley.  We have discussed risks and benefits of a steroid injection into the tendon sheath  Of the  thumb.  She wishes to proceed with that today.  I also recommended nerve conduction study to evaluate for carpal tunnel syndrome in the bilateral upper extremities.  I will have her get an x-ray of her left hand on her way out today.  I will plan to call her with the results of both test once the results become available to me.  If she has any problems, she will notify the office.  I will also plan on touching base with her in about 6 weeks.  She verbalizes understanding and agrees with the above plan.  Follow-up in about 6 weeks (around 10/18/2018).    Right Trigger finger Injection Report:  After verbal consent was obtained for right trigger finger injection, patient ID, site, and side were verified.  The  right thumb was sterilly prepped at the A-1 pulley in standard fashion.  A 25-gauge needle was introduced at the A-1 pulley site without complication. It was then injected with 10 mg lidocaine plain and 40 mg depomedrol.  A sterile bandaid was applied.  The patient was informed to apply an ice pack approximately 10min once arriving home and not to do anything strenuous for 24hours. She  was instructed to call if there were any problems. The patient was discharged in stable condition.    The patient understands, chooses and consents to this plan and accepts all   the risks which include but are not limited to the risks mentioned above.     Disclaimer: This note was prepared using a voice recognition system and is likely to have sound alike errors within the text.

## 2018-09-12 ENCOUNTER — OFFICE VISIT (OUTPATIENT)
Dept: PHYSICAL MEDICINE AND REHAB | Facility: CLINIC | Age: 63
End: 2018-09-12
Payer: MEDICARE

## 2018-09-12 ENCOUNTER — TELEPHONE (OUTPATIENT)
Dept: ORTHOPEDICS | Facility: CLINIC | Age: 63
End: 2018-09-12

## 2018-09-12 VITALS
BODY MASS INDEX: 27.62 KG/M2 | DIASTOLIC BLOOD PRESSURE: 87 MMHG | WEIGHT: 176 LBS | HEART RATE: 62 BPM | HEIGHT: 67 IN | RESPIRATION RATE: 14 BRPM | SYSTOLIC BLOOD PRESSURE: 137 MMHG

## 2018-09-12 DIAGNOSIS — G56.01 CARPAL TUNNEL SYNDROME OF RIGHT WRIST: Primary | ICD-10-CM

## 2018-09-12 PROCEDURE — 99204 OFFICE O/P NEW MOD 45 MIN: CPT | Mod: 25,S$PBB,, | Performed by: PHYSICAL MEDICINE & REHABILITATION

## 2018-09-12 PROCEDURE — 99999 PR PBB SHADOW E&M-EST. PATIENT-LVL III: CPT | Mod: PBBFAC,,, | Performed by: PHYSICAL MEDICINE & REHABILITATION

## 2018-09-12 PROCEDURE — 3008F BODY MASS INDEX DOCD: CPT | Mod: CPTII,,, | Performed by: PHYSICAL MEDICINE & REHABILITATION

## 2018-09-12 PROCEDURE — 95913 NRV CNDJ TEST 13/> STUDIES: CPT | Mod: PBBFAC,PO | Performed by: PHYSICAL MEDICINE & REHABILITATION

## 2018-09-12 PROCEDURE — 95913 NRV CNDJ TEST 13/> STUDIES: CPT | Mod: 26,S$PBB,, | Performed by: PHYSICAL MEDICINE & REHABILITATION

## 2018-09-12 PROCEDURE — 99213 OFFICE O/P EST LOW 20 MIN: CPT | Mod: PBBFAC,PO,25 | Performed by: PHYSICAL MEDICINE & REHABILITATION

## 2018-09-12 NOTE — TELEPHONE ENCOUNTER
I called Ms. Terrazas to discuss her nerve conduction study results.  She has mild to moderate carpal to syndrome bilaterally. She does have history of carpal tunnel releases done years ago.  She cannot recall wear.  I would like her to use her carpal tunnel splints at nighttime and during the day for symptomatic relief.  She has an appointment scheduled with me in about 1 month.  At that time, it if symptoms are not improved by that time we may consider carpal tunnel injections.  She verbalizes understanding and agrees.

## 2018-09-12 NOTE — PROGRESS NOTES
[facility] [address] [place] [address]  Phone: [phone]          Full Name: romi guzman YOB: 1955  Patient ID: [id number]      Visit Date: 9/12/2018 10:04  Age: 62 Years 9 Months Old  Examining Physician: Cande Drummond M.D.  Referring Physician: cesar  Reason for Referral: ue pain        Chief Complaint   Patient presents with    Hand Pain     right hand pain,numbness, weakness       HPI: This is a 62 y.o.  female being seen in clinic today for evaluation of chronic right hand pain and numbness that has worsened over the past year.  With increased hand usage, her symptoms worsen.  She admits to difficulty gripping and opening items.  Resting her hands provide some relief.      History obtained from patient    Past family, medical, social, and surgical history reviewed in chart    Review of Systems:     General- denies lethargy, weight change, fever, chills  Head/neck- denies swallowing difficulties  ENT- denies hearing changes  Cardiovascular-denies chest pain  Pulmonary- denies shortness of breath  GI- denies constipation or bowel incontinence  - denies bladder incontinence  Skin- denies wounds or rashes  Musculoskeletal- +weakness, +pain  Neurologic- +numbness and tingling  Psychiatric- denies depressive or psychotic features, denies anxiety  Lymphatic-denies swelling  Endocrine- denies hypoglycemic symptoms/DM history  All other pertinent systems negative     Physical Examination:  General: Well developed, well nourished female, NAD  HEENT:NCAT EOMI bilaterally   Pulmonary:Normal respirations    Spinal Examination: CERVICAL  Active ROM is within normal limits.  Inspection: No deformity of spinal alignment.      Spinal Examination: LUMBAR or THORACIC  Active ROM is within normal limits.  Inspection: No deformity of spinal alignment.      Musculoskeletal Tests:  Phalen:   Elbow compression (ulnar): neg  Tinels at wrist: + on right    Bilateral Upper and  Lower Extremities:  Pulses are 2+ at radial bilaterally.  Shoulder/Elbow/Wrist/Hand ROM wnl  Hip/Knee/Ankle ROM   Bilateral Extremities show normal capillary refill.  No signs of cyanosis, rubor, edema, skin changes, or dysvascular changes of appendages.  Nails appear intact.    Neurological Exam:  Cranial Nerves:  II-XII grossly intact    Manual Muscle Testing: (Motor 5=normal)  5/5 strength bilateral upper extremities except 4+/5 at right APB    No focal atrophy is noted of either upper  extremity.    Bilateral Reflexes:1+bic tric br  Garcia's response is absent bilaterally.    Sensation: tested to light touch  - intact in arms except dec at right fingertips  Gait: Narrow base and good arm swing.      Entire procedure explained to patient prior to proceeding.  Verbal consent obtained        SNC      Nerve / Sites Rec. Site Onset Lat Peak Lat Amp Segments Distance Velocity     ms ms µV  mm m/s   R Median - Digit II (Antidromic)      Wrist Dig II 3.2 4.1 14.7 Wrist - Dig  43   L Median - Digit II (Antidromic)      Wrist Dig II 2.8 3.5 26.5 Wrist - Dig  51   R Ulnar - Digit V (Antidromic)      Wrist Dig V 2.7 3.5 25.6 Wrist - Dig V 140 53   L Ulnar - Digit V (Antidromic)      Wrist Dig V 2.8 3.6 14.0 Wrist - Dig V 140 51   R Radial - Anatomical snuff box (Forearm)      Forearm Wrist 1.8 2.7 22.1 Forearm - Wrist 100 55   L Radial - Anatomical snuff box (Forearm)      Forearm Wrist 1.4 2.6 10.9 Forearm - Wrist 100 74       CSI      Nerve / Sites Rec. Site Peak Lat NP Amp Segments Peak Diff     ms µV  ms   L Median - CSI      Median Thumb 2.6 15.2 Median - Radial 0.2      Radial Thumb 2.3 10.0 Median - Ulnar 0.0      Median Ring 3.6 14.4 Median palm - Ulnar palm 0.1      Ulnar Ring 3.6 10.6        Median palm Wrist 1.9 0.98        Ulnar palm Wrist 1.8 40.6        CSI    CSI 0.3       MNC      Nerve / Sites Muscle Latency Amplitude Duration Rel Amp Segments Distance Lat Diff Velocity     ms mV ms %  mm ms  m/s   R Median - APB      Wrist APB 3.2 9.4 6.2 100 Wrist - APB 80        Elbow APB 7.8 7.0 7.0 74.2 Elbow - Wrist 215 4.5 47   L Median - APB      Wrist APB 3.0 9.8 6.2 100 Wrist - APB 80        Elbow APB 7.2 9.8 6.3 100 Elbow - Wrist 220 4.2 52   R Ulnar - ADM      Wrist ADM 2.8 7.3 6.9 100 Wrist - ADM 80        B.Elbow ADM 7.2 7.3 7.0 99.5 B.Elbow - Wrist 230 4.4 52      A.Elbow ADM 9.6 6.4 7.7 87.6 A.Elbow - B.Elbow 120 2.4 50         A.Elbow - Wrist  6.8    L Ulnar - ADM      Wrist ADM 2.7 7.6 6.9 100 Wrist - ADM 80        B.Elbow ADM 6.9 7.5 7.2 98.8 B.Elbow - Wrist 220 4.3 52      A.Elbow ADM 9.7 6.6 8.5 88.1 A.Elbow - B.Elbow 140 2.8 50         A.Elbow - Wrist  7.1                                         INTERPRETATION    -Bilateral median motor nerve conduction study showed normal latency, amplitude, and dec conduction velocity on the right  -Bilateral median sensory nerve conduction study showed prolonged peak latency on right and normal amplitude  -Bilateral ulnar motor nerve conduction study showed normal latency, amplitude, and conduction velocity  -Bilateral ulnar sensory nerve conduction study showed normal peak latency and amplitude  -Bilateral radial sensory nerve conduction study showed normal peak latency and amplitude  -Left combined sensory index was non-significant    IMPRESSION  1. ABNORMAL study  2. There is electrodiagnostic evidence of a MILD-MODERATE demyelinating median neuropathy (Carpal tunnel syndrome) across the RIGHT wrist     PLAN  1. Follow up with referring provider: Angie Duong  2. Handouts on CTS provided.   3. This study is good for one year. If symptoms worsen or do not improve, please re-consult.    Cande Drummond M.D.  Physical Medicine and Rehab

## 2018-09-12 NOTE — PATIENT INSTRUCTIONS
Carpal Tunnel Syndrome    Carpal tunnel syndrome is a painful condition of the wrist and arm. It is caused by pressure on the median nerve.  The median nerve is one of the nerves that give feeling and movement to the hand. It passes through a tunnel in the wrist called the carpal tunnel. This tunnel is made up of bones and ligaments. Narrowing of this tunnel or swelling of the tissues inside the tunnel puts pressure on the median nerve. This causes numbness, pins and needles, or electric shooting pains in your hand and forearm. Often the pain is worse at night and may wake you when you are asleep.  Carpal tunnel syndrome may occur during pregnancy and with use of birth control pills. It is more common in workers who must often bend their wrists. It is also common in people who work with power tools that cause strong vibrations.  Home care  · Rest the painful wrist. Avoid repeated bending of the wrist back and forth. This puts pressure on the median nerve. Avoid using power tools with strong vibrations.  · If you were given a splint, wear it at night while you sleep. You may also wear it during the day for comfort.  · Move your fingers and wrists often to avoid stiffness.  · Elevate your arms on pillows when you lie down.  · Try using the unaffected hand more.  · Try not to hold your wrists in a bent, downward position.  · Sometimes changes in the work place may ease symptoms. If you type most of the day, it may help to change the position of your keyboard or add a wrist support. Your wrist should be in a neutral position and not bent back when typing.  · You may use over-the-counter pain medicine to treat pain and inflammation, unless another medicine was prescribed. Anti-inflammatory pain medicines, such as ibuprofen or naproxen may be more effective than acetaminophen, which treats pain, but not inflammation. If you have chronic liver or kidney disease or ever had a stomach ulcer or GI bleeding, talk with your  doctor before using these medicines.  · Opioid pain medicine will only give temporary relief and does not treat the problem. If pain continues, you may need a shot of a steroid drug into your wrist.  · If the above methods fail, you may need surgery. This will open the carpal tunnel and release the pressure on the trapped nerve.  Follow-up care  Follow up with your healthcare provider, or as advised, if the pain doesnt begin to improve within the next week.  If X-rays were taken, you will be notified of any new findings that may affect your care.  When to seek medical advice  Call your healthcare provider right away if any of these occur:  · Pain not improving with the above treatment  · Fingers or hand become cold, blue, numb, or tingly  · Your whole arm becomes swollen or weak  Date Last Reviewed: 11/23/2015  © 6947-3447 [employer]. [address] [place] [address]. All rights reserved. This information is not intended as a substitute for professional medical care. Always follow your healthcare professional's instructions.        Carpal Tunnel Syndrome Prevention Tips  Some repetitive hand activities put you at higher risk for carpal tunnel syndrome (CTS). But you can reduce your risk. Learn how to change the way you use your hands. Below are tips for at home and on the job. Be sure to also follow the hand and wrist safety policies at your workplace.      Keep your wrist in a neutral (straight) position when exercising.      Keep your wrist in neutral  Keep a neutral (straight) wrist position as often as you can. Dont use your wrist in a bent (flexed) position for long periods of time. This includes extended or twisted positions.  Watch your   Dont just use your thumb and index finger to grasp or lift. This can put stress on your wrist. When you can, use your whole hand and all its fingers to grasp an object.  Minimize repetition  Dont move your arms or hands or hold an object in  the same way for long periods of time. Even simple, light tasks can cause injury this way. Instead, alternate tasks or switch hands.  Rest your hands  Give your hands a break from time to time with a rest. Even a few minutes once an hour can help.  Reduce speed and force  Slow down the speed in which you do a forceful, repetitive motion. This gives your wrist time to recover from the effort. Use power tools to help reduce the force.  Strengthen the muscles  Weak muscles may lead to a poor wrist or arm position. Exercises will make your hand and arm muscles stronger. This can help you keep a better position.  Date Last Reviewed: 9/11/2015 © 2000-2017 [employer]. [address] [place] [address], [place] [address]. All rights reserved. This information is not intended as a substitute for professional medical care. Always follow your healthcare professional's instructions.        Carpal Tunnel Release Surgery  Surgery may be done if your carpal tunnel syndrome (CTS) symptoms become severe. Or, you may have surgery if no other treatment brings relief. There are 2 types of CTS procedures. You will be told about the one you will have. Youll also be instructed how to prepare for it.      The goals of surgery  Two types of surgery--open and endoscopic--are used to treat CTS.  · With open surgery, your surgeon makes one incision in your palm. Standard surgical tools are used.  · With endoscopic surgery, one or two small incisions may be made in your hand. A scope (with a very small camera attached) and tools are inserted under the carpal ligament. The surgeon then operates while watching images on a video screen. No matter which one you have, the goal remains the same: Your surgeon will relieve pressure on the median nerve. To do this, the transverse carpal ligament is cut (released).  After surgery  If youve had carpal tunnel surgery, you will spend a few hours resting before you go home. The nerve sensation and  circulation in your hand will be checked at this time. For the safest healing, keep the following in mind.  · Keep your hand raised above heart level. This will help reduce swelling.  · Limit hand and wrist use as instructed. A wrist brace may be required.  · Take any pain medication as directed.  · Do hand exercises as directed by your surgeon or therapist.  When to call the surgeon  Call your surgeon if you notice any of the following:  · White or pale-blue hand or nails (If you pinch your skin or nail and the color doesnt return)  · Pain that is not relieved by prescribed medicine  · Loss of sensation or excess swelling in hand or fingers  · Fever over 100.4°F (38°C)   Date Last Reviewed: 9/11/2015  © 2759-2079 [employer]. [address] [place] [address], [place] [address]. All rights reserved. This information is not intended as a substitute for professional medical care. Always follow your healthcare professional's instructions.

## 2018-09-12 NOTE — LETTER
September 12, 2018      Angie Duong PA-C  [address] [place] [address]           [facility] - Physiatry  [address]  [place] [address]  Phone: [phone]  Fax: [phone]          Patient: Alea Terrazas   MR Number: [id number]   YOB: 1955   Date of Visit: 9/12/2018       Dear Angie Duong:    Thank you for referring Alea Terrazas to me for evaluation. Attached you will find relevant portions of my assessment and plan of care.    If you have questions, please do not hesitate to call me. I look forward to following Alea Terrazas along with you.    Sincerely,    Cande Drummond MD    Enclosure  CC:  No Recipients    If you would like to receive this communication electronically, please contact [email or web address] or [phone] to request more information on [facility] access.    For providers and/or their staff who would like to refer a patient to [facility], please contact us through our one-stop-shop provider referral line, [facility], at [phone].    If you feel you have received this communication in error or would no longer like to receive these types of communications, please e-mail [email or web address]

## 2018-10-09 ENCOUNTER — CLINICAL SUPPORT (OUTPATIENT)
Dept: SMOKING CESSATION | Facility: CLINIC | Age: 63
End: 2018-10-09
Payer: COMMERCIAL

## 2018-10-09 DIAGNOSIS — F17.200 NICOTINE DEPENDENCE: Primary | ICD-10-CM

## 2018-10-09 PROCEDURE — 99407 BEHAV CHNG SMOKING > 10 MIN: CPT | Mod: S$GLB,,,

## 2018-10-09 NOTE — PROGRESS NOTES
Called pt to f/u on her 3 and 6 month smoking cessation quit status. Pt stated she was able to quit for 2 months, but relapsed and is back to smoking. Informed her she has benefits available and is able to rejoin. Patient scheduled appointment for intake #2 on 10/16/2018. Informed patient of benefit period, phone follow ups,and contact information. Will complete 3 and 6 month smart form for quit #1.

## 2018-10-15 ENCOUNTER — TELEPHONE (OUTPATIENT)
Dept: SMOKING CESSATION | Facility: CLINIC | Age: 63
End: 2018-10-15

## 2018-10-16 ENCOUNTER — CLINICAL SUPPORT (OUTPATIENT)
Dept: SMOKING CESSATION | Facility: CLINIC | Age: 63
End: 2018-10-16
Payer: COMMERCIAL

## 2018-10-16 DIAGNOSIS — F17.200 NICOTINE DEPENDENCE: Primary | ICD-10-CM

## 2018-10-16 PROCEDURE — 99404 PREV MED CNSL INDIV APPRX 60: CPT | Mod: S$GLB,,, | Performed by: GENERAL PRACTICE

## 2018-10-16 RX ORDER — IBUPROFEN 200 MG
1 TABLET ORAL DAILY
Qty: 14 PATCH | Refills: 1 | Status: SHIPPED | OUTPATIENT
Start: 2018-10-16 | End: 2018-12-04 | Stop reason: SDUPTHER

## 2018-10-24 ENCOUNTER — CLINICAL SUPPORT (OUTPATIENT)
Dept: SMOKING CESSATION | Facility: CLINIC | Age: 63
End: 2018-10-24
Payer: COMMERCIAL

## 2018-10-24 DIAGNOSIS — F17.200 NICOTINE DEPENDENCE: Primary | ICD-10-CM

## 2018-10-24 PROCEDURE — 99407 BEHAV CHNG SMOKING > 10 MIN: CPT | Mod: S$GLB,,, | Performed by: GENERAL PRACTICE

## 2018-10-30 ENCOUNTER — CLINICAL SUPPORT (OUTPATIENT)
Dept: SMOKING CESSATION | Facility: CLINIC | Age: 63
End: 2018-10-30
Payer: COMMERCIAL

## 2018-10-30 DIAGNOSIS — F17.200 NICOTINE DEPENDENCE: Primary | ICD-10-CM

## 2018-10-30 PROCEDURE — 99404 PREV MED CNSL INDIV APPRX 60: CPT | Mod: S$GLB,,, | Performed by: GENERAL PRACTICE

## 2018-10-30 NOTE — PROGRESS NOTES
Individual Follow-Up Form    10/30/2018    Quit Date: 10-    Clinical Status of Patient: Outpatient    Length of Service: 60 minutes    Continuing Medication: yes  Patches     Target Symptoms: Withdrawal and medication side effects. The following were  rated moderate (3) to severe (4) on TCRS:  · Moderate (3): desire tobacco, sad  · Severe (4): none    Comments: Patient was seen today for a smoking cessation progress update. She states that she has not smoked in 2 days. She continues to use the 14 mg nicotine patches daily with no adverse side effects. She states that her only complaint would be that the patches tend to unravel on the edges. Discussed ways to ensure that the patch adhere properly to her skin. She verbalized understanding. She states that she still is sad because of the uncertainty of her job and her living arrangements. She states that her finances are a constant concern. Reinforced the benefits of quitting smoking and saving money that would otherwise go to purchasing cigarettes. Discussed triggers for an increased desire to smoke. She states that her morning coffee tends to be the hardest time to refrain from smoking. Will continue to encourage and monitor progress.    Diagnosis: F17.200    Next Visit: 2 weeks

## 2018-11-13 ENCOUNTER — CLINICAL SUPPORT (OUTPATIENT)
Dept: SMOKING CESSATION | Facility: CLINIC | Age: 63
End: 2018-11-13
Payer: MEDICARE

## 2018-11-13 DIAGNOSIS — F17.200 NICOTINE DEPENDENCE: Primary | ICD-10-CM

## 2018-11-13 PROCEDURE — 99404 PREV MED CNSL INDIV APPRX 60: CPT | Mod: S$GLB,,, | Performed by: GENERAL PRACTICE
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
## 2018-11-28 ENCOUNTER — CLINICAL SUPPORT (OUTPATIENT)
Dept: SMOKING CESSATION | Facility: CLINIC | Age: 63
End: 2018-11-28
Payer: COMMERCIAL

## 2018-11-28 DIAGNOSIS — F17.200 NICOTINE DEPENDENCE: Primary | ICD-10-CM

## 2018-11-28 PROCEDURE — 99407 BEHAV CHNG SMOKING > 10 MIN: CPT | Mod: S$GLB,,, | Performed by: GENERAL PRACTICE

## 2018-12-04 ENCOUNTER — TELEPHONE (OUTPATIENT)
Dept: SMOKING CESSATION | Facility: CLINIC | Age: 63
End: 2018-12-04

## 2018-12-04 DIAGNOSIS — F17.200 NICOTINE DEPENDENCE: ICD-10-CM

## 2018-12-04 RX ORDER — IBUPROFEN 200 MG
1 TABLET ORAL DAILY
Qty: 14 PATCH | Refills: 0 | Status: SHIPPED | OUTPATIENT
Start: 2018-12-04 | End: 2018-12-21 | Stop reason: SDUPTHER

## 2018-12-12 ENCOUNTER — TELEPHONE (OUTPATIENT)
Dept: SMOKING CESSATION | Facility: CLINIC | Age: 63
End: 2018-12-12

## 2018-12-12 NOTE — TELEPHONE ENCOUNTER
2nd attempt to contact patient for a smoking cessation progress update. Recorded message left with return contact information.

## 2018-12-21 ENCOUNTER — CLINICAL SUPPORT (OUTPATIENT)
Dept: SMOKING CESSATION | Facility: CLINIC | Age: 63
End: 2018-12-21
Payer: COMMERCIAL

## 2018-12-21 ENCOUNTER — TELEPHONE (OUTPATIENT)
Dept: SMOKING CESSATION | Facility: CLINIC | Age: 63
End: 2018-12-21

## 2018-12-21 DIAGNOSIS — F17.200 NICOTINE DEPENDENCE: ICD-10-CM

## 2018-12-21 DIAGNOSIS — F17.200 NICOTINE DEPENDENCE: Primary | ICD-10-CM

## 2018-12-21 PROCEDURE — 99404 PREV MED CNSL INDIV APPRX 60: CPT | Mod: S$GLB,,, | Performed by: GENERAL PRACTICE

## 2018-12-21 RX ORDER — IBUPROFEN 200 MG
1 TABLET ORAL DAILY
Qty: 14 PATCH | Refills: 0 | Status: SHIPPED | OUTPATIENT
Start: 2018-12-21 | End: 2019-01-04 | Stop reason: SDUPTHER

## 2019-01-04 ENCOUNTER — CLINICAL SUPPORT (OUTPATIENT)
Dept: SMOKING CESSATION | Facility: CLINIC | Age: 64
End: 2019-01-04
Payer: COMMERCIAL

## 2019-01-04 ENCOUNTER — TELEPHONE (OUTPATIENT)
Dept: SMOKING CESSATION | Facility: CLINIC | Age: 64
End: 2019-01-04

## 2019-01-04 DIAGNOSIS — F17.200 NICOTINE DEPENDENCE: Primary | ICD-10-CM

## 2019-01-04 DIAGNOSIS — F17.200 NICOTINE DEPENDENCE: ICD-10-CM

## 2019-01-04 PROCEDURE — 99404 PR PREVENT COUNSEL,INDIV,60 MIN: ICD-10-PCS | Mod: S$GLB,,, | Performed by: GENERAL PRACTICE

## 2019-01-04 PROCEDURE — 99404 PREV MED CNSL INDIV APPRX 60: CPT | Mod: S$GLB,,, | Performed by: GENERAL PRACTICE

## 2019-01-04 RX ORDER — IBUPROFEN 200 MG
1 TABLET ORAL DAILY
Qty: 14 PATCH | Refills: 0 | Status: SHIPPED | OUTPATIENT
Start: 2019-01-04 | End: 2019-06-04 | Stop reason: DRUGHIGH

## 2019-01-04 NOTE — PROGRESS NOTES
Individual Follow-Up Form    1/4/2019    Quit Date: 12-    Clinical Status of Patient: Outpatient    Length of Service: 60 minutes    Continuing Medication: yes  Patches     Target Symptoms: Withdrawal and medication side effects. The following were  rated moderate (3) to severe (4) on TCRS:  · Moderate (3): restless, desire tobacco  · Severe (4): none    Comments: Patient was seen today for a smoking cessation progress update. She has successfully reached her target quit date as discussed at last appointment. The patient remains on the prescribed tobacco cessation medication regimen of 14 mg Nicoderm CQ daily without any negative side effects at this time. The patient denies any abnormal behavioral or mental changes at this time. She states that she is not ready to wean to a lower dose patch at this time. Will continue to encourage and monitor progress.    Diagnosis: F17.200    Next Visit: 2 weeks

## 2019-02-05 ENCOUNTER — CLINICAL SUPPORT (OUTPATIENT)
Dept: SMOKING CESSATION | Facility: CLINIC | Age: 64
End: 2019-02-05
Payer: COMMERCIAL

## 2019-02-05 DIAGNOSIS — F17.200 NICOTINE DEPENDENCE: Primary | ICD-10-CM

## 2019-02-05 PROCEDURE — 99401 PREV MED CNSL INDIV APPRX 15: CPT | Mod: S$GLB,,, | Performed by: GENERAL PRACTICE

## 2019-02-05 PROCEDURE — 99401 PR PREVENT COUNSEL,INDIV,15 MIN: ICD-10-PCS | Mod: S$GLB,,, | Performed by: GENERAL PRACTICE

## 2019-02-05 NOTE — PROGRESS NOTES
Individual Follow-Up Form    2/5/2019    Clinical Status of Patient: Outpatient    Length of Service: 15 minutes    Continuing Medication: no     Target Symptoms: Withdrawal and medication side effects. The following were  rated moderate (3) to severe (4) on TCRS:  · Moderate (3): sad, depressed, desire tobacco, anxious, tired  · Severe (4): none    Comments: Spoke with patient in regards to her smoking cessation progress. She states that she has had a lot of personal stress in her life with her finances and living situation. She is currently looking for a new place to live with a new roommate. 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 Discussed coping strategies and encouraged her to make a new goal for her smoking cessation. She verbalized understanding. She states that she needs to get her life straight and will call back in a few weeks when things settle down. Discussed the expiration of her Trust benefits. She stated that she wants to renew her benefits but will wait until she is ready top schedule her next appointment. She has not been using the nicotine patches and states that 1 pack of cigarettes will last her over 1 week. Will continue to encourage and monitor progress.    Diagnosis: F17.200    Next Visit: 2 weeks

## 2019-03-28 ENCOUNTER — CLINICAL SUPPORT (OUTPATIENT)
Dept: SMOKING CESSATION | Facility: CLINIC | Age: 64
End: 2019-03-28
Payer: COMMERCIAL

## 2019-03-28 DIAGNOSIS — F17.200 NICOTINE DEPENDENCE: Primary | ICD-10-CM

## 2019-03-28 PROCEDURE — 99407 PR TOBACCO USE CESSATION INTENSIVE >10 MINUTES: ICD-10-PCS | Mod: S$GLB,,,

## 2019-03-28 PROCEDURE — 99407 BEHAV CHNG SMOKING > 10 MIN: CPT | Mod: S$GLB,,,

## 2019-03-28 NOTE — PROGRESS NOTES
Spoke with patient today in regard to smoking cessation progress 12 month phone follow up on quit 1 and 3/6 on quit 2, states not tobacco free. Patient is  interested in returning to the smoking cessation program but in the process of moving currently, but will call when settled. Informed patient of benefit period, phone follow up at 1 year, and contact information. Will complete smart form for 12 month phone follow up on Quit attempt #1 and 3/6 month on quit 2

## 2019-04-03 ENCOUNTER — CLINICAL SUPPORT (OUTPATIENT)
Dept: SMOKING CESSATION | Facility: CLINIC | Age: 64
End: 2019-04-03
Payer: COMMERCIAL

## 2019-04-03 DIAGNOSIS — F17.200 NICOTINE DEPENDENCE: Primary | ICD-10-CM

## 2019-04-03 PROCEDURE — 99407 BEHAV CHNG SMOKING > 10 MIN: CPT | Mod: S$GLB,,, | Performed by: GENERAL PRACTICE

## 2019-04-03 PROCEDURE — 99407 PR TOBACCO USE CESSATION INTENSIVE >10 MINUTES: ICD-10-PCS | Mod: S$GLB,,, | Performed by: GENERAL PRACTICE

## 2019-05-22 ENCOUNTER — CLINICAL SUPPORT (OUTPATIENT)
Dept: SMOKING CESSATION | Facility: CLINIC | Age: 64
End: 2019-05-22
Payer: COMMERCIAL

## 2019-05-22 DIAGNOSIS — F17.200 NICOTINE DEPENDENCE: Primary | ICD-10-CM

## 2019-05-22 PROCEDURE — 99401 PR PREVENT COUNSEL,INDIV,15 MIN: ICD-10-PCS | Mod: S$GLB,,, | Performed by: GENERAL PRACTICE

## 2019-05-22 PROCEDURE — 99401 PREV MED CNSL INDIV APPRX 15: CPT | Mod: S$GLB,,, | Performed by: GENERAL PRACTICE

## 2019-05-22 NOTE — PROGRESS NOTES
"Individual Follow-Up Form    5/22/2019    Clinical Status of Patient: Outpatient    Length of Service: 15 minutes    Continuing Medication: no     Target Symptoms: Withdrawal and medication side effects. The following were  rated moderate (3) to severe (4) on TCRS:  · Moderate (3): depressed, restless, anxious, difficulty concentrating, desire tobacco, sleep disturbances  · Severe (4): none    Comments: Spoke with patient by telephone in regards to her smoking cessation progress. She states that she has "slid backwards" with her smoking. She has not been using her nicotine patches as previously discussed. She stated that she had a lot of family and personal issues going on in her life that she feels has prevented her from being successful for this quit attempt. She is still interested in making another quit attempt. Discussed benefits expiration and encouraged a clinic follow up. Appointment scheduled. Discussed coping strategies and a new quit plan. Will continue to encourage and monitor progress.    Diagnosis: F17.200    Next Visit: 1 week  "

## 2019-06-03 DIAGNOSIS — M79.641 PAIN OF RIGHT HAND: Primary | ICD-10-CM

## 2019-06-04 ENCOUNTER — HOSPITAL ENCOUNTER (OUTPATIENT)
Dept: RADIOLOGY | Facility: HOSPITAL | Age: 64
Discharge: HOME | End: 2019-06-04
Attending: PHYSICIAN ASSISTANT
Payer: MEDICARE

## 2019-06-04 ENCOUNTER — CLINICAL SUPPORT (OUTPATIENT)
Dept: SMOKING CESSATION | Facility: CLINIC | Age: 64
End: 2019-06-04
Payer: COMMERCIAL

## 2019-06-04 ENCOUNTER — OFFICE VISIT (OUTPATIENT)
Dept: ORTHOPEDICS | Facility: CLINIC | Age: 64
End: 2019-06-04
Payer: MEDICARE

## 2019-06-04 VITALS
WEIGHT: 176 LBS | HEART RATE: 58 BPM | HEIGHT: 67 IN | SYSTOLIC BLOOD PRESSURE: 111 MMHG | BODY MASS INDEX: 27.62 KG/M2 | DIASTOLIC BLOOD PRESSURE: 78 MMHG

## 2019-06-04 DIAGNOSIS — F17.200 NICOTINE DEPENDENCE: Primary | ICD-10-CM

## 2019-06-04 DIAGNOSIS — G56.01 CARPAL TUNNEL SYNDROME ON RIGHT: Primary | ICD-10-CM

## 2019-06-04 DIAGNOSIS — M79.641 PAIN OF RIGHT HAND: ICD-10-CM

## 2019-06-04 DIAGNOSIS — M65.311 TRIGGER THUMB OF RIGHT HAND: ICD-10-CM

## 2019-06-04 PROCEDURE — 99214 PR OFFICE/OUTPT VISIT, EST, LEVL IV, 30-39 MIN: ICD-10-PCS | Mod: 25,S$GLB,, | Performed by: PHYSICIAN ASSISTANT

## 2019-06-04 PROCEDURE — 99214 OFFICE O/P EST MOD 30 MIN: CPT | Mod: 25,S$GLB,, | Performed by: PHYSICIAN ASSISTANT

## 2019-06-04 PROCEDURE — 3008F BODY MASS INDEX DOCD: CPT | Mod: CPTII,S$GLB,, | Performed by: PHYSICIAN ASSISTANT

## 2019-06-04 PROCEDURE — 73130 XR HAND COMPLETE 3 VIEW RIGHT: ICD-10-PCS | Mod: 26,RT,, | Performed by: RADIOLOGY

## 2019-06-04 PROCEDURE — 99999 PR PBB SHADOW E&M-EST. PATIENT-LVL IV: CPT | Mod: PBBFAC,,, | Performed by: PHYSICIAN ASSISTANT

## 2019-06-04 PROCEDURE — 99999 PR PBB SHADOW E&M-EST. PATIENT-LVL IV: ICD-10-PCS | Mod: PBBFAC,,, | Performed by: PHYSICIAN ASSISTANT

## 2019-06-04 PROCEDURE — 73130 X-RAY EXAM OF HAND: CPT | Mod: 26,RT,, | Performed by: RADIOLOGY

## 2019-06-04 PROCEDURE — 20526 PR INJECT CARPAL TUNNEL: ICD-10-PCS | Mod: RT,S$GLB,, | Performed by: PHYSICIAN ASSISTANT

## 2019-06-04 PROCEDURE — 99999 PR PBB SHADOW E&M-EST. PATIENT-LVL I: CPT | Mod: PBBFAC,,,

## 2019-06-04 PROCEDURE — 3008F PR BODY MASS INDEX (BMI) DOCUMENTED: ICD-10-PCS | Mod: CPTII,S$GLB,, | Performed by: PHYSICIAN ASSISTANT

## 2019-06-04 PROCEDURE — 99404 PR PREVENT COUNSEL,INDIV,60 MIN: ICD-10-PCS | Mod: S$GLB,,, | Performed by: GENERAL PRACTICE

## 2019-06-04 PROCEDURE — 99999 PR PBB SHADOW E&M-EST. PATIENT-LVL I: ICD-10-PCS | Mod: PBBFAC,,,

## 2019-06-04 PROCEDURE — 73130 X-RAY EXAM OF HAND: CPT | Mod: TC,RT

## 2019-06-04 PROCEDURE — 20526 THER INJECTION CARP TUNNEL: CPT | Mod: RT,S$GLB,, | Performed by: PHYSICIAN ASSISTANT

## 2019-06-04 PROCEDURE — 99404 PREV MED CNSL INDIV APPRX 60: CPT | Mod: S$GLB,,, | Performed by: GENERAL PRACTICE

## 2019-06-04 RX ORDER — METHYLPREDNISOLONE ACETATE 40 MG/ML
40 INJECTION, SUSPENSION INTRA-ARTICULAR; INTRALESIONAL; INTRAMUSCULAR; SOFT TISSUE ONCE
Status: COMPLETED | OUTPATIENT
Start: 2019-06-04 | End: 2019-06-04

## 2019-06-04 RX ORDER — NICOTINE 7MG/24HR
1 PATCH, TRANSDERMAL 24 HOURS TRANSDERMAL DAILY
Qty: 14 PATCH | Refills: 1 | Status: SHIPPED | OUTPATIENT
Start: 2019-06-04 | End: 2021-12-14

## 2019-06-04 RX ADMIN — METHYLPREDNISOLONE ACETATE 40 MG: 40 INJECTION, SUSPENSION INTRA-ARTICULAR; INTRALESIONAL; INTRAMUSCULAR; SOFT TISSUE at 10:06

## 2019-06-04 NOTE — PROGRESS NOTES
"Individual Follow-Up Form    6/4/2019    Clinical Status of Patient: Outpatient    Length of Service: 60 minutes    Continuing Medication: yes  Patches     Target Symptoms: Withdrawal and medication side effects. The following were  rated moderate (3) to severe (4) on TCRS:  · Moderate (3): sad, depressed, desire tobacco  · Severe (4): none    Comments: Patient was seen today for a smoking cessation progress update. She states that she has not been doing well with her smoking and has fully relapsed. She is smoking 5 cigarettes daily and has not been using the nicotine patches as previously discussed. She states that she recently moved into a new house by herself and she is extremely lonely. She states that she stays away from home so that she doesn't get depressed. She stated that she smokes more when she is at home. Discussed coping strategies and encouraged her to talk with her PCP about her depression. Discussed a quit plan. 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 She is interested in moving to [place] to be around family and for a "fresh start". Will continue to encourage and monitor progress.    Diagnosis: F17.200    Next Visit: 2 weeks  "

## 2019-06-04 NOTE — PROGRESS NOTES
Patient ID: Alea Terrazas is a 63 y.o. female.    Chief Complaint: Pain and Injury of the Right Hand (Pt stated that she fell and to break her fall she used her right hand this happened two weeks ago from today )      HPI: Alea Terrazas  is a 63 y.o. female who c/o Pain and Injury of the Right Hand (Pt stated that she fell and to break her fall she used her right hand this happened two weeks ago from today )   for duration of more than a year, but worse in the last 2 weeks.  She had a fall on the hand and she is now complaining of increased symptoms at the right MCP joint of the thumb.  I have given her a steroid injection for right trigger thumb in the past.  She says about work, but only lasted about a month.  She also has known carpal tunnel on the right wrist. It is mild-to-moderate in severity.  We have not done in the recent injection. She uses a night splints for symptomatic relief.  Pain level today is 2/10 in severity.  She complains of associated numbness and tingling.  Quality is aching and throbbing.  She also complains of associated locking of the IP joint of the thumb.  Alleviating factors include nothing.  Aggravating factors include making a full fist.    Past Medical History:   Diagnosis Date    Arthritis     Carpal tunnel syndrome     Chronic neck and back pain     Hypertension      Past Surgical History:   Procedure Laterality Date    CARPAL TUNNEL RELEASE Bilateral     NECK SURGERY  2016, 2017    SHOULDER SURGERY Right      Family History   Problem Relation Age of Onset    Cancer Father     Diabetes Father      Social History     Socioeconomic History    Marital status:      Spouse name: Not on file    Number of children: Not on file    Years of education: Not on file    Highest education level: Not on file   Occupational History    Not on file   Social Needs    Financial resource strain: Not on file    Food insecurity:     Worry: Not on file     Inability: Not on file     Transportation needs:     Medical: Not on file     Non-medical: Not on file   Tobacco Use    Smoking status: Current Some Day Smoker     Packs/day: 0.50     Years: 41.00     Pack years: 20.50     Types: Cigarettes    Smokeless tobacco: Never Used   Substance and Sexual Activity    Alcohol use: Yes     Comment: occasionally    Drug use: No    Sexual activity: Not on file   Lifestyle    Physical activity:     Days per week: Not on file     Minutes per session: Not on file    Stress: Not on file   Relationships    Social connections:     Talks on phone: Not on file     Gets together: Not on file     Attends Rastafarian service: Not on file     Active member of club or organization: Not on file     Attends meetings of clubs or organizations: Not on file     Relationship status: Not on file   Other Topics Concern    Not on file   Social History Narrative    Not on file     Medication List with Changes/Refills   Current Medications    AQUANAZ 10- MG TAB    Take 1 tablet by mouth every 6 (six) hours.    CETIRIZINE (ZYRTEC) 10 MG TABLET        CITALOPRAM (CELEXA) 20 MG TABLET    Take 20 mg by mouth.    CLONIDINE (CATAPRES) 0.1 MG TABLET        CYCLOBENZAPRINE (FLEXERIL) 10 MG TABLET    Take 10 mg by mouth.    DICLOFENAC POTASSIUM 25 MG CAP    Take 25 mg by mouth.    ERGOCALCIFEROL (ERGOCALCIFEROL) 50,000 UNIT CAP    Take 50,000 Units by mouth.    FLUTICASONE (FLONASE) 50 MCG/ACTUATION NASAL SPRAY    2 sprays by Each Nare route once daily.    GABAPENTIN (NEURONTIN) 300 MG CAPSULE    Take 300 mg by mouth.    IBUPROFEN (ADVIL,MOTRIN) 800 MG TABLET    Take 800 mg by mouth.    LISINOPRIL-HYDROCHLOROTHIAZIDE (PRINZIDE,ZESTORETIC) 20-12.5 MG PER TABLET        LORATADINE (CLARITIN) 10 MG TABLET    Take 10 mg by mouth once daily.    MELOXICAM (MOBIC) 15 MG TABLET    Take 15 mg by mouth once daily.    MONTELUKAST (SINGULAIR) 10 MG TABLET    Take 10 mg by mouth once daily.    MUPIROCIN (BACTROBAN) 2 % OINTMENT    ANABELL AA  BID    NICOTINE (NICODERM CQ) 14 MG/24 HR    Place 1 patch onto the skin once daily.    OXYCODONE-ACETAMINOPHEN (PERCOCET) 5-325 MG PER TABLET    Take 1 tablet by mouth every 8 (eight) hours.     Review of patient's allergies indicates:  No Known Allergies        Objective:        General    Nursing note and vitals reviewed.  Constitutional: She is oriented to person, place, and time. She appears well-developed and well-nourished.   HENT:   Head: Normocephalic and atraumatic.   Eyes: EOM are normal.   Cardiovascular: Normal rate and regular rhythm.    Pulmonary/Chest: Effort normal.   Abdominal: Soft.   Neurological: She is alert and oriented to person, place, and time.   Psychiatric: She has a normal mood and affect. Her behavior is normal.             Right Hand/Wrist Exam     Inspection   Scars: Wrist - absent Hand -  absent  Effusion: Wrist - absent Hand -  absent  Bruising: Wrist - absent Hand -  absent  Deformity: Wrist - deformity Hand -  deformity    Range of Motion     Wrist   Extension: normal   Flexion: normal   Pronation: normal   Supination: normal     Tests   Phalens sign: positive  Tinel's sign (median nerve): positive  Finkelstein's test: negative  Carpal Tunnel Compression Test: positive  Cubital Tunnel Compression Test: negative    Atrophy   Thenar:  negative  Hypothenar:  negative  Intrinsic:  negative  1st Dorsal Interosseous: negative    Other     Neuorologic Exam    Median Distribution: normal  Ulnar Distribution: normal  Radial Distribution: normal    Comments:  2+ radial pulse  Active triggering thumb  TTP a-1 pulley thumb  O/w no bony ttp of the hand or wrist        Left Hand/Wrist Exam     Range of Motion     Wrist   Extension: normal   Flexion: normal   Pronation: normal   Supination: normal     Comments:  2+ radial pulse      Right Elbow Exam     Tests   Tinel's sign (cubital tunnel): negative          Muscle Strength   Right Upper Extremity   Wrist extension: 5/5/5   Wrist flexion:  5/5/5   : 5/5/5   Thumb - APB: 5/5  Pinch Mechanism: 5/5  Left Upper Extremity  Wrist extension: 5/5/5   Wrist flexion: 5/5/5   :  5/5/5   Thumb - APB: 5/5  Pinch Mechanism: 5/5    Vascular Exam       Capillary Refill  Right Hand: normal capillary refill              Assessment:       Encounter Diagnoses   Name Primary?    Carpal tunnel syndrome on right Yes    Trigger thumb of right hand           Plan:       Alea was seen today for pain and injury.    Diagnoses and all orders for this visit:    Carpal tunnel syndrome on right  -     methylPREDNISolone acetate injection 40 mg    Trigger thumb of right hand    Ms llanos comes in today for exacerbation of an existing problem.  I will have her get new x-rays of her right hand on the way out to ensure she does not have a new injury.  I will notify her of those results as soon as I review the x-rays.  She only drives about 1 month benefit from the steroid injection into the trigger thumb.  I would hold off on repeating that injection for now.  She may be a candidate to have a surgical release of the trigger thumb.  I will have her see Dr. Meyer later this summer with the starts.  I would also like him to evaluate her carpal tunnel syndrome on the right.  I have discussed risks and benefits of doing a carpal tunnel injection today. She wishes to proceed with that.  If she fails to receive symptomatic relief, she may also benefit from a carpal tunnel release.  I would defer any further workup or treatment to Dr. Meyer.  She may continue with the wrist splints at nighttime and during the day as needed. She verbalizes understanding and agrees with the above plan.    RTC for consult with Dr. Meyer this summer      Right carpal tunnel Injection Report:  After verbal consent was obtained for right carpal tunnel injection, patient ID, site, and side were verified.  The  right wrist was sterilly prepped in the standard fashion.  A 25-gauge needle was introduced  into right carpal tunnel without complication and was then injected with 5 mg lidocaine plain and 40 mg depomedrol.  A sterile bandaid was applied.  The patient was informed to apply an ice pack approximately 10min once arriving home and not to do anything strenuous for 24hours. She was instructed to call if there were any problems. The patient was discharged in stable condition.    The patient understands, chooses and consents to this plan and accepts all   the risks which include but are not limited to the risks mentioned above.     Disclaimer: This note was prepared using a voice recognition system and is likely to have sound alike errors within the text.

## 2019-08-07 ENCOUNTER — HOSPITAL ENCOUNTER (OUTPATIENT)
Dept: RADIOLOGY | Facility: HOSPITAL | Age: 64
Discharge: HOME | End: 2019-08-07
Attending: ORTHOPAEDIC SURGERY
Payer: MEDICARE

## 2019-08-07 ENCOUNTER — OFFICE VISIT (OUTPATIENT)
Dept: ORTHOPEDICS | Facility: CLINIC | Age: 64
End: 2019-08-07
Payer: MEDICARE

## 2019-08-07 ENCOUNTER — CLINICAL SUPPORT (OUTPATIENT)
Dept: CARDIOLOGY | Facility: CLINIC | Age: 64
End: 2019-08-07
Payer: MEDICARE

## 2019-08-07 VITALS
WEIGHT: 176 LBS | SYSTOLIC BLOOD PRESSURE: 114 MMHG | DIASTOLIC BLOOD PRESSURE: 78 MMHG | HEART RATE: 68 BPM | BODY MASS INDEX: 27.62 KG/M2 | HEIGHT: 67 IN

## 2019-08-07 DIAGNOSIS — Z01.818 PREOP TESTING: ICD-10-CM

## 2019-08-07 DIAGNOSIS — G56.03 CARPAL TUNNEL SYNDROME ON BOTH SIDES: Primary | ICD-10-CM

## 2019-08-07 DIAGNOSIS — M65.311 TRIGGER FINGER OF RIGHT THUMB: ICD-10-CM

## 2019-08-07 DIAGNOSIS — Z01.818 PREOP TESTING: Primary | ICD-10-CM

## 2019-08-07 PROCEDURE — 99214 PR OFFICE/OUTPT VISIT, EST, LEVL IV, 30-39 MIN: ICD-10-PCS | Mod: S$GLB,,, | Performed by: ORTHOPAEDIC SURGERY

## 2019-08-07 PROCEDURE — 93005 EKG 12-LEAD: ICD-10-PCS | Mod: S$GLB,,, | Performed by: INTERNAL MEDICINE

## 2019-08-07 PROCEDURE — 93010 ELECTROCARDIOGRAM REPORT: CPT | Mod: S$GLB,,, | Performed by: INTERNAL MEDICINE

## 2019-08-07 PROCEDURE — 71046 X-RAY EXAM CHEST 2 VIEWS: CPT | Mod: TC

## 2019-08-07 PROCEDURE — 71046 X-RAY EXAM CHEST 2 VIEWS: CPT | Mod: 26,,, | Performed by: RADIOLOGY

## 2019-08-07 PROCEDURE — 71046 XR CHEST PA AND LATERAL PRE-OP: ICD-10-PCS | Mod: 26,,, | Performed by: RADIOLOGY

## 2019-08-07 PROCEDURE — 99214 OFFICE O/P EST MOD 30 MIN: CPT | Mod: S$GLB,,, | Performed by: ORTHOPAEDIC SURGERY

## 2019-08-07 PROCEDURE — 93005 ELECTROCARDIOGRAM TRACING: CPT | Mod: S$GLB,,, | Performed by: INTERNAL MEDICINE

## 2019-08-07 PROCEDURE — 93010 EKG 12-LEAD: ICD-10-PCS | Mod: S$GLB,,, | Performed by: INTERNAL MEDICINE

## 2019-08-07 PROCEDURE — 99999 PR PBB SHADOW E&M-EST. PATIENT-LVL III: CPT | Mod: PBBFAC,,, | Performed by: ORTHOPAEDIC SURGERY

## 2019-08-07 PROCEDURE — 99999 PR PBB SHADOW E&M-EST. PATIENT-LVL III: ICD-10-PCS | Mod: PBBFAC,,, | Performed by: ORTHOPAEDIC SURGERY

## 2019-08-07 PROCEDURE — 3008F BODY MASS INDEX DOCD: CPT | Mod: CPTII,S$GLB,, | Performed by: ORTHOPAEDIC SURGERY

## 2019-08-07 PROCEDURE — 3008F PR BODY MASS INDEX (BMI) DOCUMENTED: ICD-10-PCS | Mod: CPTII,S$GLB,, | Performed by: ORTHOPAEDIC SURGERY

## 2019-08-07 NOTE — PROGRESS NOTES
Subjective:     Patient ID: Alea Terrazas is a 63 y.o. female.    Chief Complaint: Pain of the Right Wrist and Pain of the Right Hand    The patient is a 63-year-old female with bilateral carpal tunnel syndrome symptoms.  Nerve conduction studies have confirmed right carpal tunnel syndrome September 12, 2018.  She has tried splinting without improvement and she has tried injection without improvement.  She wishes to have a right carpal tunnel release.  Additionally she has a right trigger thumb she wished to have it released at the same surgery.      Past Medical History:   Diagnosis Date    Arthritis     Carpal tunnel syndrome     Chronic neck and back pain     Hypertension      Past Surgical History:   Procedure Laterality Date    CARPAL TUNNEL RELEASE Bilateral     NECK SURGERY  2016, 2017    SHOULDER SURGERY Right      Family History   Problem Relation Age of Onset    Cancer Father     Diabetes Father      Social History     Socioeconomic History    Marital status:      Spouse name: Not on file    Number of children: Not on file    Years of education: Not on file    Highest education level: Not on file   Occupational History    Not on file   Social Needs    Financial resource strain: Not on file    Food insecurity:     Worry: Not on file     Inability: Not on file    Transportation needs:     Medical: Not on file     Non-medical: Not on file   Tobacco Use    Smoking status: Current Some Day Smoker     Packs/day: 0.50     Years: 41.00     Pack years: 20.50     Types: Cigarettes    Smokeless tobacco: Never Used   Substance and Sexual Activity    Alcohol use: Yes     Comment: occasionally    Drug use: No    Sexual activity: Not on file   Lifestyle    Physical activity:     Days per week: Not on file     Minutes per session: Not on file    Stress: Not on file   Relationships    Social connections:     Talks on phone: Not on file     Gets together: Not on file     Attends Pentecostalism  service: Not on file     Active member of club or organization: Not on file     Attends meetings of clubs or organizations: Not on file     Relationship status: Not on file   Other Topics Concern    Not on file   Social History Narrative    Not on file     Medication List with Changes/Refills   Current Medications    AQUANAZ 10- MG TAB    Take 1 tablet by mouth every 6 (six) hours.    CETIRIZINE (ZYRTEC) 10 MG TABLET        CITALOPRAM (CELEXA) 20 MG TABLET    Take 20 mg by mouth.    CLONIDINE (CATAPRES) 0.1 MG TABLET        CYCLOBENZAPRINE (FLEXERIL) 10 MG TABLET    Take 10 mg by mouth.    DICLOFENAC POTASSIUM 25 MG CAP    Take 25 mg by mouth.    ERGOCALCIFEROL (ERGOCALCIFEROL) 50,000 UNIT CAP    Take 50,000 Units by mouth.    FLUTICASONE (FLONASE) 50 MCG/ACTUATION NASAL SPRAY    2 sprays by Each Nare route once daily.    GABAPENTIN (NEURONTIN) 300 MG CAPSULE    Take 300 mg by mouth.    IBUPROFEN (ADVIL,MOTRIN) 800 MG TABLET    Take 800 mg by mouth.    LISINOPRIL-HYDROCHLOROTHIAZIDE (PRINZIDE,ZESTORETIC) 20-12.5 MG PER TABLET        LORATADINE (CLARITIN) 10 MG TABLET    Take 10 mg by mouth once daily.    MELOXICAM (MOBIC) 15 MG TABLET    Take 15 mg by mouth once daily.    MONTELUKAST (SINGULAIR) 10 MG TABLET    Take 10 mg by mouth once daily.    MUPIROCIN (BACTROBAN) 2 % OINTMENT    ANABELL AA BID    NICOTINE (NICODERM CQ) 7 MG/24 HR    Place 1 patch onto the skin once daily.    OXYCODONE-ACETAMINOPHEN (PERCOCET) 5-325 MG PER TABLET    Take 1 tablet by mouth every 8 (eight) hours.     Review of patient's allergies indicates:  No Known Allergies  Review of Systems   Constitution: Negative for chills and decreased appetite.   HENT: Negative for hearing loss.    Eyes: Negative for double vision and visual disturbance.   Cardiovascular: Negative for chest pain.   Endocrine: Negative for cold intolerance.   Hematologic/Lymphatic: Does not bruise/bleed easily.   Skin: Negative for poor wound healing and suspicious  lesions.   Musculoskeletal: Negative for gout, joint pain and joint swelling.   Gastrointestinal: Negative for nausea and vomiting.   Genitourinary: Negative for dysuria.   Neurological: Positive for numbness, paresthesias and sensory change. Negative for seizures.   Psychiatric/Behavioral: Negative for depression, memory loss, substance abuse, suicidal ideas and thoughts of violence.   Allergic/Immunologic: Negative for HIV exposure and persistent infections.       Objective:   Body mass index is 27.57 kg/m².  Vitals:    08/07/19 1100   BP: 114/78   Pulse: 68                General    Constitutional: She is oriented to person, place, and time. She appears well-developed and well-nourished. No distress.   HENT:   Head: Normocephalic.   Mouth/Throat: Oropharynx is clear and moist.   Eyes: EOM are normal. Pupils are equal, round, and reactive to light.   Neck: Normal range of motion.   Cardiovascular: Normal rate, regular rhythm and normal heart sounds.  Exam reveals no gallop.    Pulmonary/Chest: Breath sounds normal.   Abdominal: Soft. She exhibits no mass. There is no tenderness.   Neurological: She is alert and oriented to person, place, and time. No cranial nerve deficit.   Psychiatric: She has a normal mood and affect.            With attention to both wrists there is a positive Tinel and a positive Phalen sign.  Two-point discrimination is 3-5 mm in all fingertips.  There is no intrinsic atrophy noted.  With attention to the right thumb there is active triggering with a palpable knot nodule that moves with tendon excursion.    Relevant imaging results reviewed and interpreted by me, discussed with the patient and / or family today radiographs both wrists were normal.  Assessment:     Encounter Diagnoses   Name Primary?    Carpal tunnel syndrome on both sides Yes    Trigger finger of right thumb         Plan:           The patient was counseled regarding a right carpal tunnel release and right trigger thumb  release.  Risks complications and alternatives were discussed including the risk of infection, anesthetic risk, injury to nerves or vessels, loss of motion, and possible need for additional surgeries were discussed. She seems to understand and agree to that surgery. All questions were answered.          Disclaimer: This note was prepared using a voice recognition system and is likely to have sound alike errors within the text.

## 2019-08-07 NOTE — H&P (VIEW-ONLY)
Subjective:     Patient ID: Alea Terrazas is a 63 y.o. female.    Chief Complaint: Pain of the Right Wrist and Pain of the Right Hand    The patient is a 63-year-old female with bilateral carpal tunnel syndrome symptoms.  Nerve conduction studies have confirmed right carpal tunnel syndrome September 12, 2018.  She has tried splinting without improvement and she has tried injection without improvement.  She wishes to have a right carpal tunnel release.  Additionally she has a right trigger thumb she wished to have it released at the same surgery.      Past Medical History:   Diagnosis Date    Arthritis     Carpal tunnel syndrome     Chronic neck and back pain     Hypertension      Past Surgical History:   Procedure Laterality Date    CARPAL TUNNEL RELEASE Bilateral     NECK SURGERY  2016, 2017    SHOULDER SURGERY Right      Family History   Problem Relation Age of Onset    Cancer Father     Diabetes Father      Social History     Socioeconomic History    Marital status:      Spouse name: Not on file    Number of children: Not on file    Years of education: Not on file    Highest education level: Not on file   Occupational History    Not on file   Social Needs    Financial resource strain: Not on file    Food insecurity:     Worry: Not on file     Inability: Not on file    Transportation needs:     Medical: Not on file     Non-medical: Not on file   Tobacco Use    Smoking status: Current Some Day Smoker     Packs/day: 0.50     Years: 41.00     Pack years: 20.50     Types: Cigarettes    Smokeless tobacco: Never Used   Substance and Sexual Activity    Alcohol use: Yes     Comment: occasionally    Drug use: No    Sexual activity: Not on file   Lifestyle    Physical activity:     Days per week: Not on file     Minutes per session: Not on file    Stress: Not on file   Relationships    Social connections:     Talks on phone: Not on file     Gets together: Not on file     Attends Taoist  service: Not on file     Active member of club or organization: Not on file     Attends meetings of clubs or organizations: Not on file     Relationship status: Not on file   Other Topics Concern    Not on file   Social History Narrative    Not on file     Medication List with Changes/Refills   Current Medications    AQUANAZ 10- MG TAB    Take 1 tablet by mouth every 6 (six) hours.    CETIRIZINE (ZYRTEC) 10 MG TABLET        CITALOPRAM (CELEXA) 20 MG TABLET    Take 20 mg by mouth.    CLONIDINE (CATAPRES) 0.1 MG TABLET        CYCLOBENZAPRINE (FLEXERIL) 10 MG TABLET    Take 10 mg by mouth.    DICLOFENAC POTASSIUM 25 MG CAP    Take 25 mg by mouth.    ERGOCALCIFEROL (ERGOCALCIFEROL) 50,000 UNIT CAP    Take 50,000 Units by mouth.    FLUTICASONE (FLONASE) 50 MCG/ACTUATION NASAL SPRAY    2 sprays by Each Nare route once daily.    GABAPENTIN (NEURONTIN) 300 MG CAPSULE    Take 300 mg by mouth.    IBUPROFEN (ADVIL,MOTRIN) 800 MG TABLET    Take 800 mg by mouth.    LISINOPRIL-HYDROCHLOROTHIAZIDE (PRINZIDE,ZESTORETIC) 20-12.5 MG PER TABLET        LORATADINE (CLARITIN) 10 MG TABLET    Take 10 mg by mouth once daily.    MELOXICAM (MOBIC) 15 MG TABLET    Take 15 mg by mouth once daily.    MONTELUKAST (SINGULAIR) 10 MG TABLET    Take 10 mg by mouth once daily.    MUPIROCIN (BACTROBAN) 2 % OINTMENT    ANABELL AA BID    NICOTINE (NICODERM CQ) 7 MG/24 HR    Place 1 patch onto the skin once daily.    OXYCODONE-ACETAMINOPHEN (PERCOCET) 5-325 MG PER TABLET    Take 1 tablet by mouth every 8 (eight) hours.     Review of patient's allergies indicates:  No Known Allergies  Review of Systems   Constitution: Negative for chills and decreased appetite.   HENT: Negative for hearing loss.    Eyes: Negative for double vision and visual disturbance.   Cardiovascular: Negative for chest pain.   Endocrine: Negative for cold intolerance.   Hematologic/Lymphatic: Does not bruise/bleed easily.   Skin: Negative for poor wound healing and suspicious  lesions.   Musculoskeletal: Negative for gout, joint pain and joint swelling.   Gastrointestinal: Negative for nausea and vomiting.   Genitourinary: Negative for dysuria.   Neurological: Positive for numbness, paresthesias and sensory change. Negative for seizures.   Psychiatric/Behavioral: Negative for depression, memory loss, substance abuse, suicidal ideas and thoughts of violence.   Allergic/Immunologic: Negative for HIV exposure and persistent infections.       Objective:   Body mass index is 27.57 kg/m².  Vitals:    08/07/19 1100   BP: 114/78   Pulse: 68                General    Constitutional: She is oriented to person, place, and time. She appears well-developed and well-nourished. No distress.   HENT:   Head: Normocephalic.   Mouth/Throat: Oropharynx is clear and moist.   Eyes: EOM are normal. Pupils are equal, round, and reactive to light.   Neck: Normal range of motion.   Cardiovascular: Normal rate, regular rhythm and normal heart sounds.  Exam reveals no gallop.    Pulmonary/Chest: Breath sounds normal.   Abdominal: Soft. She exhibits no mass. There is no tenderness.   Neurological: She is alert and oriented to person, place, and time. No cranial nerve deficit.   Psychiatric: She has a normal mood and affect.            With attention to both wrists there is a positive Tinel and a positive Phalen sign.  Two-point discrimination is 3-5 mm in all fingertips.  There is no intrinsic atrophy noted.  With attention to the right thumb there is active triggering with a palpable knot nodule that moves with tendon excursion.    Relevant imaging results reviewed and interpreted by me, discussed with the patient and / or family today radiographs both wrists were normal.  Assessment:     Encounter Diagnoses   Name Primary?    Carpal tunnel syndrome on both sides Yes    Trigger finger of right thumb         Plan:           The patient was counseled regarding a right carpal tunnel release and right trigger thumb  release.  Risks complications and alternatives were discussed including the risk of infection, anesthetic risk, injury to nerves or vessels, loss of motion, and possible need for additional surgeries were discussed. She seems to understand and agree to that surgery. All questions were answered.          Disclaimer: This note was prepared using a voice recognition system and is likely to have sound alike errors within the text.

## 2019-08-29 ENCOUNTER — HOSPITAL ENCOUNTER (OUTPATIENT)
Dept: PREADMISSION TESTING | Facility: HOSPITAL | Age: 64
Discharge: HOME | End: 2019-08-29
Attending: NURSE PRACTITIONER
Payer: MEDICARE

## 2019-08-29 ENCOUNTER — HOSPITAL ENCOUNTER (OUTPATIENT)
Dept: PREADMISSION TESTING | Facility: HOSPITAL | Age: 64
Discharge: HOME | End: 2019-08-29
Attending: ORTHOPAEDIC SURGERY
Payer: MEDICARE

## 2019-08-29 VITALS
RESPIRATION RATE: 16 BRPM | TEMPERATURE: 98 F | SYSTOLIC BLOOD PRESSURE: 114 MMHG | OXYGEN SATURATION: 98 % | DIASTOLIC BLOOD PRESSURE: 78 MMHG | HEART RATE: 60 BPM

## 2019-08-29 VITALS
SYSTOLIC BLOOD PRESSURE: 114 MMHG | HEART RATE: 60 BPM | DIASTOLIC BLOOD PRESSURE: 78 MMHG | TEMPERATURE: 98 F | RESPIRATION RATE: 16 BRPM

## 2019-08-29 DIAGNOSIS — F41.9 ANXIETY: ICD-10-CM

## 2019-08-29 DIAGNOSIS — Z01.818 PREOPERATIVE TESTING: Primary | ICD-10-CM

## 2019-08-29 DIAGNOSIS — G56.03 CARPAL TUNNEL SYNDROME ON BOTH SIDES: ICD-10-CM

## 2019-08-29 PROBLEM — G89.29 CHRONIC BACK PAIN: Status: ACTIVE | Noted: 2019-08-29

## 2019-08-29 PROBLEM — I10 HYPERTENSION: Status: ACTIVE | Noted: 2019-08-29

## 2019-08-29 PROBLEM — M54.9 CHRONIC BACK PAIN: Status: ACTIVE | Noted: 2019-08-29

## 2019-08-29 LAB — POTASSIUM SERPL-SCNC: 3.5 MMOL/L (ref 3.5–5.1)

## 2019-08-29 PROCEDURE — 84132 ASSAY OF SERUM POTASSIUM: CPT

## 2019-08-29 RX ORDER — POTASSIUM CHLORIDE 20 MEQ/1
20 TABLET, EXTENDED RELEASE ORAL DAILY
COMMUNITY
End: 2021-12-14

## 2019-08-29 RX ORDER — TRIAMTERENE AND HYDROCHLOROTHIAZIDE 37.5; 25 MG/1; MG/1
1 CAPSULE ORAL DAILY
COMMUNITY
End: 2020-09-15

## 2019-08-29 RX ORDER — ACETAMINOPHEN 500 MG
1000 TABLET ORAL
Status: CANCELLED | OUTPATIENT
Start: 2019-08-29 | End: 2019-08-30

## 2019-08-29 RX ORDER — FUROSEMIDE 20 MG/1
20 TABLET ORAL DAILY
COMMUNITY
End: 2021-12-14

## 2019-08-29 RX ORDER — ALPRAZOLAM 0.25 MG/1
0.25 TABLET ORAL
Status: CANCELLED | OUTPATIENT
Start: 2019-08-29 | End: 2019-08-30

## 2019-08-29 NOTE — DISCHARGE INSTRUCTIONS
To confirm, Your doctor has instructed you that surgery is scheduled for 09/05/2019.       Please report to [facility].  Pre admit office will call afternoon prior to surgery with final arrival time    INSTRUCTIONS IMPORTANT!!!   Do not eat, drink, or smoke after 12 midnight-including water. OK to brush teeth, no gum, candy or mints!    ¨ Take only these medicines with a small swallow of water-morning of surgery.  N/A    Pre operative instructions:  Please review the Pre-Operative Instruction booklet that you were given.        Bathing Instructions--See page 6 in the Pre-operative booklet.      Prevention of surgical site infections:     -Keep incisions clean and dry.   -Do not soak/submerge incisions in water until completely healed.   -Do not apply lotions, powders, creams, or deodorants to site.   -Always make sure hands are cleaned with antibacterial soap/ alcohol-based   prior to touching the surgical site.  (This includes doctors, nurses, staff, and yourself.)    Signs and symptoms:   -Redness and pain around the area where you had surgery   -Drainage of cloudy fluid from your surgical wound   -Fever over 100.4       I have read or had read and explained to me, and understand the above information.  Additional comments or instructions:  Received a copy of Pre-operative instructions booklet, FAQ surgical site infection sheet, and packets of hibiclens (if indicated).

## 2019-08-29 NOTE — ASSESSMENT & PLAN NOTE
The patient has carpal tunnel syndrome and right trigger thumb and will have a right CTR and trigger thumb release on 9/5/19    Known risk factors for perioperative complications:   HTN    Difficulty with intubation is not anticipated.    Cardiac Risk Estimation: low intraoperative cardiac risk    1.) Preoperative workup as follows: electrolytes.  2.) Change in medication regimen before surgery: discontinue NSAIDs (Mobic) 7 days before surgery and discontinue antihypertensives (Lasix and Dyazide) to avoid hypotension from anesthesia.  3.) Prophylaxis for cardiac events with perioperative beta-blockers: not indicated.  4.) Invasive hemodynamic monitoring perioperatively: not indicated.  5.) Deep vein thrombosis prophylaxis postoperatively: regimen to be chosen by surgical team.  6.) Surveillance for postoperative MI with ECG immediately postoperatively and on postoperati ve days 1 and 2 AND troponin levels 24 hours postoperatively and on day 4 or hospital discharge (whichever comes first): not indicated.  7.) Current medications which may produce withdrawal symptoms if withheld perioperatively: percocet  8.) Other measures: Preoperative alcohol abstention x 30 days.  Preoperative tobacco abstention x 60 days.

## 2019-08-29 NOTE — H&P
Preoperative History and Physical                                                             Hospital Medicine      Chief Complaint: Preoperative evaluation     History of Present Illness:      Alea Terrazas is a 63 y.o. female with HTN, chronic pain, right carpal tunnel syndrome and right trigger thumb who presents to the office today for a preoperative consultation at the request of Dr. Meyer who plans on performing Right carpal tunnel and right trigger thumb release on September 5.     Functional Status:      The patient is able to climb a flight of stairs. The patient is able at least one block but walking is limited due to back pain.  The patient does eprform all heavy cleaning needed at her house. The patient's functional status is not affected by the surgical problem except with hand pain. The patient's functional status is not affected by shortness of breath, chest pain, dyspnea on exertion and fatigue.    MET score greater than 4    Past Medical History:      Past Medical History:   Diagnosis Date    Anxiety     Arthritis     Carpal tunnel syndrome     Chronic neck and back pain     Hypertension         Past Surgical History:      Past Surgical History:   Procedure Laterality Date    CARPAL TUNNEL RELEASE Left     CERVICAL FUSION  2016, 2017    ROTATOR CUFF REPAIR Right         Social History:      Social History     Socioeconomic History    Marital status:      Spouse name: Not on file    Number of children: Not on file    Years of education: Not on file    Highest education level: Not on file   Occupational History    Not on file   Social Needs    Financial resource strain: Not on file    Food insecurity:     Worry: Not on file     Inability: Not on file    Transportation needs:     Medical: Not on file     Non-medical: Not on file   Tobacco Use    Smoking status: Current Some Day Smoker     Packs/day: 0.50     Years: 41.00     Pack  years: 20.50     Types: Cigarettes    Smokeless tobacco: Never Used   Substance and Sexual Activity    Alcohol use: Yes     Frequency: Monthly or less     Comment: occasionally    Drug use: No    Sexual activity: Not on file   Lifestyle    Physical activity:     Days per week: Not on file     Minutes per session: Not on file    Stress: Not on file   Relationships    Social connections:     Talks on phone: Not on file     Gets together: Not on file     Attends Synagogue service: Not on file     Active member of club or organization: Not on file     Attends meetings of clubs or organizations: Not on file     Relationship status: Not on file   Other Topics Concern    Not on file   Social History Narrative    Not on file        Family History:      Family History   Problem Relation Age of Onset    Cancer Father     Diabetes Father     COPD Mother 41    Kidney failure Mother        Allergies:      Review of patient's allergies indicates:  No Known Allergies    Medications:      Current Outpatient Medications   Medication Sig    AQUANAZ 10- mg Tab Take 1 tablet by mouth every 6 (six) hours as needed.     cetirizine (ZYRTEC) 10 MG tablet Take 10 mg by mouth once daily.     cyclobenzaprine (FLEXERIL) 10 MG tablet Take 10 mg by mouth 3 (three) times daily as needed.     ergocalciferol (ERGOCALCIFEROL) 50,000 unit Cap Take 50,000 Units by mouth every 7 days.     fluticasone (FLONASE) 50 mcg/actuation nasal spray 2 sprays by Each Nare route once daily.    furosemide (LASIX) 20 MG tablet Take 20 mg by mouth once daily.    gabapentin (NEURONTIN) 300 MG capsule Take 300 mg by mouth daily as needed.     loratadine (CLARITIN) 10 mg tablet Take 10 mg by mouth once daily.    meloxicam (MOBIC) 15 MG tablet Take 15 mg by mouth once daily.    montelukast (SINGULAIR) 10 mg tablet Take 10 mg by mouth daily as needed.     nicotine (NICODERM CQ) 7 mg/24 hr Place 1 patch onto the skin once daily.     oxyCODONE-acetaminophen (PERCOCET) 5-325 mg per tablet Take 1 tablet by mouth every 8 (eight) hours.    potassium chloride SA (K-DUR,KLOR-CON) 20 MEQ tablet Take 20 mEq by mouth once daily.    triamterene-hydrochlorothiazide 37.5-25 mg (DYAZIDE) 37.5-25 mg per capsule Take 1 capsule by mouth once daily.     No current facility-administered medications for this encounter.        Vitals:      Vitals:    08/29/19 0948   BP: 114/78   Pulse: 60   Resp: 16   Temp: 97.9 °F (36.6 °C)       Review of Systems:        Constitutional: Negative for fever, chills, weight loss, malaise/fatigue and diaphoresis.   HENT: Negative for hearing loss, ear pain, nosebleeds, congestion, sore throat, neck pain, tinnitus and ear discharge.    Eyes: Negative for blurred vision, double vision, photophobia, pain, discharge and redness.   Respiratory: Negative for cough, hemoptysis, sputum production, shortness of breath, wheezing and stridor.    Cardiovascular: Negative for chest pain, palpitations, orthopnea, claudication, leg swelling and PND.   Gastrointestinal: Negative for heartburn, nausea, vomiting, abdominal pain, diarrhea, constipation, blood in stool and melena.   Genitourinary: Negative for dysuria, urgency, frequency, hematuria and flank pain.   Musculoskeletal: +right hand pain +chronic neck and back pain  Negative for myalgias,  joint pain and falls.   Skin: Negative for itching and rash.   Neurological: Negative for dizziness, tingling, tremors, sensory change, speech change, focal weakness, seizures, loss of consciousness, weakness and headaches.   Endo/Heme/Allergies: Negative for environmental allergies and polydipsia. Does not bruise/bleed easily.   Psychiatric/Behavioral: +anxiety about surgery Negative for depression, suicidal ideas, hallucinations, memory loss and substance abuse. The patient  does not have insomnia.    All 14 systems reviewed and negative except as noted above.    Physical Exam:      Constitutional:  Appears well-developed, well-nourished and in no acute distress.  Patient is oriented to person, place, and time.   Head: Normocephalic and atraumatic. Mucous membranes moist.  Neck: Neck supple no mass.   Cardiovascular: Normal rate and regular rhythm.  S1 S2 appreciated by ascultation.  Pulmonary/Chest: Effort normal and clear to auscultation bilaterally. No respiratory distress.   Abdomen: Soft. Non-tender and non-distended. Bowel sounds are normal.   Neurological: Patient is alert and oriented to person, place and time. Moves all extremities.  Skin: Warm and dry. No lesions.  Extremities: No clubbing, cyanosis or edema.    Laboratory data:      Reviewed and noted in plan where applicable. Please see chart for full laboratory data.    No results for input(s): CPK, CPKMB, TROPONINI, MB in the last 24 hours. No results for input(s): POCTGLUCOSE in the last 24 hours.     No results found for: INR, PROTIME    Lab Results   Component Value Date    WBC 8.18 2019    HGB 14.3 2019    HCT 41.2 2019    MCV 93 2019     2019       Recent Labs   Lab 19  1000   K 3.5       Predictors of intubation difficulty:       Morbid obesity? no   Anatomically abnormal facies? no   Prominent incisors? no   Receding mandible? no   Short, thick neck? no   Neck range of motion: normal   Dentition: Missing teeth (lower) and dentures to upper     Cardiographics:      EC19  Normal sinus rhythm  Possible Left atrial enlargement  Low voltage QRS  Incomplete right bundle branch block  Septal infarct ,age undetermined  Abnormal ECG  No previous ECGs available  Confirmed by PALAK TEJEDA, GRECIA ([id number]) on 2019 11:07:49 PM    Echocardiogram: not done    Imaging:      Chest x-ray: 19  The lungs are clear and free of infiltrate.  No pleural effusion or pneumothorax. The heart is not enlarged. There is tortuosity of the descending thoracic aorta.  Postoperative changes associated with the lower cervical  spine.    Assessment and Plan:      Carpal tunnel syndrome on both sides  The patient has carpal tunnel syndrome and right trigger thumb and will have a right CTR and trigger thumb release on 9/5/19    Known risk factors for perioperative complications:   HTN  Tobacco abuse    Difficulty with intubation is not anticipated.    Cardiac Risk Estimation: low intraoperative cardiac risk    1.) Preoperative workup as follows: electrolytes.  2.) Change in medication regimen before surgery: discontinue NSAIDs (Mobic) 7 days before surgery and discontinue antihypertensives (Lasix and Dyazide) to avoid hypotension from anesthesia.  3.) Prophylaxis for cardiac events with perioperative beta-blockers: not indicated.  4.) Invasive hemodynamic monitoring perioperatively: not indicated.  5.) Deep vein thrombosis prophylaxis postoperatively: regimen to be chosen by surgical team.  6.) Surveillance for postoperative MI with ECG immediately postoperatively and on postoperati ve days 1 and 2 AND troponin levels 24 hours postoperatively and on day 4 or hospital discharge (whichever comes first): not indicated.  7.) Current medications which may produce withdrawal symptoms if withheld perioperatively: percocet  8.) Other measures: Preoperative alcohol abstention x 30 days.  Preoperative tobacco abstention x 60 days.    Hypertension  /78 in clinic today  Cont lasix and Dyazide for now   Hold day prior and day surgery     Chronic back pain  Cont Gabapentin, Percocet prn, and Flexeril prn     Tobacco abuse   Smoking cessation education provided

## 2019-09-04 ENCOUNTER — ANESTHESIA EVENT (OUTPATIENT)
Dept: SURGERY | Facility: HOSPITAL | Age: 64
End: 2019-09-04
Payer: MEDICARE

## 2019-09-04 PROBLEM — I10 HYPERTENSION: Chronic | Status: ACTIVE | Noted: 2019-08-29

## 2019-09-04 PROBLEM — Z98.1 S/P CERVICAL SPINAL FUSION: Status: ACTIVE | Noted: 2019-09-04

## 2019-09-04 PROBLEM — M65.319 TRIGGER THUMB: Status: ACTIVE | Noted: 2019-09-04

## 2019-09-04 NOTE — ANESTHESIA PREPROCEDURE EVALUATION
09/04/2019  Alea Terrazas is a 63 y.o., female.    Anesthesia Evaluation    I have reviewed the Patient Summary Reports.    I have reviewed the Nursing Notes.   I have reviewed the Medications.     Review of Systems  Anesthesia Hx:  History of prior surgery of interest to airway management or planning: cervical fusion.  Denies Personal Hx of Anesthesia complications.   Social:  Smoker, Alcohol Use    Cardiovascular:   Hypertension ECG has been reviewed.    Musculoskeletal:   Arthritis   Spine Disorders: cervical    Psych:   anxiety          Physical Exam  General:  Well nourished    Airway/Jaw/Neck:  Airway Findings: Mouth Opening: Normal Tongue: Large  General Airway Assessment: Adult, Average  Mallampati: III  Jaw/Neck Findings:  Neck ROM: Extension Decreased, Severe            Mental Status:  Mental Status Findings:  Cooperative, Alert and Oriented         Anesthesia Plan  Type of Anesthesia, risks & benefits discussed:  Anesthesia Type:  MAC  Patient's Preference:   Intra-op Monitoring Plan: standard ASA monitors  Intra-op Monitoring Plan Comments:   Post Op Pain Control Plan:   Post Op Pain Control Plan Comments:   Induction:   IV  Beta Blocker:  Patient is not currently on a Beta-Blocker (No further documentation required).       Informed Consent: Patient understands risks and agrees with Anesthesia plan.  Questions answered. Anesthesia consent signed with patient.  ASA Score: 2     Day of Surgery Review of History & Physical:    H&P update referred to the surgeon.         Ready For Surgery From Anesthesia Perspective.

## 2019-09-05 ENCOUNTER — ANESTHESIA (OUTPATIENT)
Dept: SURGERY | Facility: HOSPITAL | Age: 64
End: 2019-09-05
Payer: MEDICARE

## 2019-09-05 ENCOUNTER — HOSPITAL ENCOUNTER (OUTPATIENT)
Facility: HOSPITAL | Age: 64
Discharge: HOME | End: 2019-09-05
Attending: ORTHOPAEDIC SURGERY | Admitting: ORTHOPAEDIC SURGERY
Payer: MEDICARE

## 2019-09-05 VITALS
RESPIRATION RATE: 21 BRPM | WEIGHT: 172.75 LBS | HEART RATE: 66 BPM | SYSTOLIC BLOOD PRESSURE: 114 MMHG | OXYGEN SATURATION: 95 % | BODY MASS INDEX: 27.11 KG/M2 | HEIGHT: 67 IN | TEMPERATURE: 98 F | DIASTOLIC BLOOD PRESSURE: 68 MMHG

## 2019-09-05 DIAGNOSIS — G56.03 CARPAL TUNNEL SYNDROME ON BOTH SIDES: Primary | ICD-10-CM

## 2019-09-05 DIAGNOSIS — G56.01 CARPAL TUNNEL SYNDROME OF RIGHT WRIST: ICD-10-CM

## 2019-09-05 PROBLEM — F17.200 SMOKER: Chronic | Status: ACTIVE | Noted: 2019-09-05

## 2019-09-05 PROCEDURE — 36000706: Performed by: ORTHOPAEDIC SURGERY

## 2019-09-05 PROCEDURE — 36000707: Performed by: ORTHOPAEDIC SURGERY

## 2019-09-05 PROCEDURE — 63600175 PHARM REV CODE 636 W HCPCS: Performed by: ORTHOPAEDIC SURGERY

## 2019-09-05 PROCEDURE — 37000008 HC ANESTHESIA 1ST 15 MINUTES: Performed by: ORTHOPAEDIC SURGERY

## 2019-09-05 PROCEDURE — D9220A PRA ANESTHESIA: ICD-10-PCS | Mod: ANES,,, | Performed by: ANESTHESIOLOGY

## 2019-09-05 PROCEDURE — 25000003 PHARM REV CODE 250: Performed by: NURSE PRACTITIONER

## 2019-09-05 PROCEDURE — 25000003 PHARM REV CODE 250: Performed by: ANESTHESIOLOGY

## 2019-09-05 PROCEDURE — D9220A PRA ANESTHESIA: Mod: CRNA,,, | Performed by: NURSE ANESTHETIST, CERTIFIED REGISTERED

## 2019-09-05 PROCEDURE — 25000003 PHARM REV CODE 250: Performed by: ORTHOPAEDIC SURGERY

## 2019-09-05 PROCEDURE — 26055 PR INCISE FINGER TENDON SHEATH: ICD-10-PCS | Mod: 51,F5,, | Performed by: ORTHOPAEDIC SURGERY

## 2019-09-05 PROCEDURE — 63600175 PHARM REV CODE 636 W HCPCS: Performed by: ANESTHESIOLOGY

## 2019-09-05 PROCEDURE — 64721 CARPAL TUNNEL SURGERY: CPT | Mod: RT,,, | Performed by: ORTHOPAEDIC SURGERY

## 2019-09-05 PROCEDURE — D9220A PRA ANESTHESIA: Mod: ANES,,, | Performed by: ANESTHESIOLOGY

## 2019-09-05 PROCEDURE — 27200651 HC AIRWAY, LMA: Performed by: NURSE ANESTHETIST, CERTIFIED REGISTERED

## 2019-09-05 PROCEDURE — 63600175 PHARM REV CODE 636 W HCPCS: Performed by: NURSE ANESTHETIST, CERTIFIED REGISTERED

## 2019-09-05 PROCEDURE — 71000033 HC RECOVERY, INTIAL HOUR: Performed by: ORTHOPAEDIC SURGERY

## 2019-09-05 PROCEDURE — 25000003 PHARM REV CODE 250: Performed by: NURSE ANESTHETIST, CERTIFIED REGISTERED

## 2019-09-05 PROCEDURE — 71000015 HC POSTOP RECOV 1ST HR: Performed by: ORTHOPAEDIC SURGERY

## 2019-09-05 PROCEDURE — 64721 PR REVISE MEDIAN N/CARPAL TUNNEL SURG: ICD-10-PCS | Mod: RT,,, | Performed by: ORTHOPAEDIC SURGERY

## 2019-09-05 PROCEDURE — 26055 INCISE FINGER TENDON SHEATH: CPT | Mod: 51,F5,, | Performed by: ORTHOPAEDIC SURGERY

## 2019-09-05 PROCEDURE — 37000009 HC ANESTHESIA EA ADD 15 MINS: Performed by: ORTHOPAEDIC SURGERY

## 2019-09-05 PROCEDURE — D9220A PRA ANESTHESIA: ICD-10-PCS | Mod: CRNA,,, | Performed by: NURSE ANESTHETIST, CERTIFIED REGISTERED

## 2019-09-05 RX ORDER — CHLORHEXIDINE GLUCONATE ORAL RINSE 1.2 MG/ML
10 SOLUTION DENTAL
Status: CANCELLED | OUTPATIENT
Start: 2019-09-05

## 2019-09-05 RX ORDER — BUPIVACAINE HYDROCHLORIDE 2.5 MG/ML
INJECTION, SOLUTION EPIDURAL; INFILTRATION; INTRACAUDAL
Status: DISCONTINUED | OUTPATIENT
Start: 2019-09-05 | End: 2019-09-05 | Stop reason: HOSPADM

## 2019-09-05 RX ORDER — TRIAMCINOLONE ACETONIDE 40 MG/ML
INJECTION, SUSPENSION INTRA-ARTICULAR; INTRAMUSCULAR
Status: DISCONTINUED
Start: 2019-09-05 | End: 2019-09-05 | Stop reason: HOSPADM

## 2019-09-05 RX ORDER — HYDROCODONE BITARTRATE AND ACETAMINOPHEN 5; 325 MG/1; MG/1
1 TABLET ORAL EVERY 6 HOURS PRN
Qty: 15 TABLET | Refills: 0 | Status: SHIPPED | OUTPATIENT
Start: 2019-09-05 | End: 2020-09-15

## 2019-09-05 RX ORDER — BUPIVACAINE HYDROCHLORIDE 2.5 MG/ML
INJECTION, SOLUTION EPIDURAL; INFILTRATION; INTRACAUDAL
Status: DISCONTINUED
Start: 2019-09-05 | End: 2019-09-05 | Stop reason: WASHOUT

## 2019-09-05 RX ORDER — EPHEDRINE SULFATE 50 MG/ML
INJECTION, SOLUTION INTRAVENOUS
Status: DISCONTINUED | OUTPATIENT
Start: 2019-09-05 | End: 2019-09-05

## 2019-09-05 RX ORDER — OXYCODONE HYDROCHLORIDE 5 MG/1
5 TABLET ORAL ONCE AS NEEDED
Status: COMPLETED | OUTPATIENT
Start: 2019-09-05 | End: 2019-09-05

## 2019-09-05 RX ORDER — IBUPROFEN 600 MG/1
600 TABLET ORAL ONCE
Status: COMPLETED | OUTPATIENT
Start: 2019-09-05 | End: 2019-09-05

## 2019-09-05 RX ORDER — SODIUM CHLORIDE, SODIUM LACTATE, POTASSIUM CHLORIDE, CALCIUM CHLORIDE 600; 310; 30; 20 MG/100ML; MG/100ML; MG/100ML; MG/100ML
INJECTION, SOLUTION INTRAVENOUS CONTINUOUS
Status: DISCONTINUED | OUTPATIENT
Start: 2019-09-05 | End: 2019-09-05 | Stop reason: HOSPADM

## 2019-09-05 RX ORDER — PROPOFOL 10 MG/ML
INJECTION, EMULSION INTRAVENOUS
Status: DISCONTINUED | OUTPATIENT
Start: 2019-09-05 | End: 2019-09-05

## 2019-09-05 RX ORDER — LIDOCAINE HCL/PF 100 MG/5ML
SYRINGE (ML) INTRAVENOUS
Status: DISCONTINUED | OUTPATIENT
Start: 2019-09-05 | End: 2019-09-05

## 2019-09-05 RX ORDER — ACETAMINOPHEN 500 MG
1000 TABLET ORAL
Status: COMPLETED | OUTPATIENT
Start: 2019-09-05 | End: 2019-09-05

## 2019-09-05 RX ORDER — CHLORHEXIDINE GLUCONATE ORAL RINSE 1.2 MG/ML
10 SOLUTION DENTAL 2 TIMES DAILY
Status: DISCONTINUED | OUTPATIENT
Start: 2019-09-05 | End: 2019-09-05 | Stop reason: HOSPADM

## 2019-09-05 RX ORDER — TRIAMCINOLONE ACETONIDE 40 MG/ML
INJECTION, SUSPENSION INTRA-ARTICULAR; INTRAMUSCULAR
Status: DISCONTINUED | OUTPATIENT
Start: 2019-09-05 | End: 2019-09-05 | Stop reason: HOSPADM

## 2019-09-05 RX ORDER — FENTANYL CITRATE 50 UG/ML
INJECTION, SOLUTION INTRAMUSCULAR; INTRAVENOUS
Status: DISCONTINUED | OUTPATIENT
Start: 2019-09-05 | End: 2019-09-05

## 2019-09-05 RX ORDER — CEFAZOLIN SODIUM 2 G/50ML
2 SOLUTION INTRAVENOUS
Status: CANCELLED | OUTPATIENT
Start: 2019-09-05

## 2019-09-05 RX ORDER — MIDAZOLAM HYDROCHLORIDE 1 MG/ML
INJECTION, SOLUTION INTRAMUSCULAR; INTRAVENOUS
Status: DISCONTINUED | OUTPATIENT
Start: 2019-09-05 | End: 2019-09-05

## 2019-09-05 RX ORDER — LIDOCAINE HYDROCHLORIDE 20 MG/ML
INJECTION, SOLUTION EPIDURAL; INFILTRATION; INTRACAUDAL; PERINEURAL
Status: DISCONTINUED | OUTPATIENT
Start: 2019-09-05 | End: 2019-09-05 | Stop reason: HOSPADM

## 2019-09-05 RX ORDER — ALPRAZOLAM 0.25 MG/1
0.25 TABLET ORAL
Status: COMPLETED | OUTPATIENT
Start: 2019-09-05 | End: 2019-09-05

## 2019-09-05 RX ORDER — LIDOCAINE HYDROCHLORIDE 10 MG/ML
0.5 INJECTION, SOLUTION EPIDURAL; INFILTRATION; INTRACAUDAL; PERINEURAL ONCE
Status: DISCONTINUED | OUTPATIENT
Start: 2019-09-05 | End: 2019-09-05 | Stop reason: HOSPADM

## 2019-09-05 RX ORDER — LIDOCAINE HYDROCHLORIDE 20 MG/ML
INJECTION, SOLUTION INFILTRATION; PERINEURAL
Status: DISCONTINUED
Start: 2019-09-05 | End: 2019-09-05 | Stop reason: HOSPADM

## 2019-09-05 RX ORDER — ONDANSETRON 2 MG/ML
INJECTION INTRAMUSCULAR; INTRAVENOUS
Status: DISCONTINUED | OUTPATIENT
Start: 2019-09-05 | End: 2019-09-05

## 2019-09-05 RX ADMIN — OXYCODONE HYDROCHLORIDE 5 MG: 5 TABLET ORAL at 08:09

## 2019-09-05 RX ADMIN — LIDOCAINE HYDROCHLORIDE 80 MG: 20 INJECTION, SOLUTION INTRAVENOUS at 07:09

## 2019-09-05 RX ADMIN — FENTANYL CITRATE 100 MCG: 50 INJECTION, SOLUTION INTRAMUSCULAR; INTRAVENOUS at 07:09

## 2019-09-05 RX ADMIN — PROPOFOL 50 MG: 10 INJECTION, EMULSION INTRAVENOUS at 07:09

## 2019-09-05 RX ADMIN — PROPOFOL 150 MG: 10 INJECTION, EMULSION INTRAVENOUS at 07:09

## 2019-09-05 RX ADMIN — EPHEDRINE SULFATE 10 MG: 50 INJECTION INTRAMUSCULAR; INTRAVENOUS; SUBCUTANEOUS at 07:09

## 2019-09-05 RX ADMIN — MIDAZOLAM 2 MG: 1 INJECTION INTRAMUSCULAR; INTRAVENOUS at 07:09

## 2019-09-05 RX ADMIN — ACETAMINOPHEN 1000 MG: 500 TABLET ORAL at 06:09

## 2019-09-05 RX ADMIN — SODIUM CHLORIDE 2 G: 9 INJECTION, SOLUTION INTRAVENOUS at 07:09

## 2019-09-05 RX ADMIN — SODIUM CHLORIDE, SODIUM LACTATE, POTASSIUM CHLORIDE, AND CALCIUM CHLORIDE: 600; 310; 30; 20 INJECTION, SOLUTION INTRAVENOUS at 06:09

## 2019-09-05 RX ADMIN — ONDANSETRON 4 MG: 2 INJECTION, SOLUTION INTRAMUSCULAR; INTRAVENOUS at 07:09

## 2019-09-05 RX ADMIN — IBUPROFEN 600 MG: 600 TABLET ORAL at 06:09

## 2019-09-05 RX ADMIN — ALPRAZOLAM 0.25 MG: 0.25 TABLET ORAL at 06:09

## 2019-09-05 NOTE — DISCHARGE SUMMARY
[facility]  Discharge Note  Short Stay    OUTCOME: Patient tolerated treatment/procedure well without complication and is now ready for discharge.    DISPOSITION: Home or Self Care    FINAL DIAGNOSIS:  Carpal tunnel syndrome of right wrist right trigger thumb    FOLLOWUP: In orthopedic clinic

## 2019-09-05 NOTE — TRANSFER OF CARE
"Anesthesia Transfer of Care Note    Patient: Alea Terrazas    Procedure(s) Performed: Procedure(s) (LRB):  RELEASE, CARPAL TUNNEL (Right)  RELEASE, TRIGGER FINGER (Right)    Patient location: PACU    Anesthesia Type: general    Transport from OR: Transported from OR on room air with adequate spontaneous ventilation    Post pain: adequate analgesia    Post assessment: no apparent anesthetic complications and tolerated procedure well    Post vital signs: stable    Level of consciousness: awake    Nausea/Vomiting: no nausea/vomiting    Complications: none    Transfer of care protocol was followed      Last vitals:   Visit Vitals  /77 (BP Location: Right arm, Patient Position: Sitting)   Pulse (P) 68   Temp (P) 36.4 °C (97.5 °F) (Temporal)   Resp (P) 18   Ht 5' 7" (1.702 m)   Wt 78.4 kg (172 lb 11.7 oz)   SpO2 (P) 99%   Breastfeeding? No   BMI 27.05 kg/m²     "

## 2019-09-05 NOTE — OP NOTE
[facility]  Orthopedic Surgery  Operative Note    SUMMARY     Date of Procedure: 9/5/2019   Assistant: None    Procedure: Procedure(s) (LRB):  RELEASE, CARPAL TUNNEL (Right)  RELEASE, TRIGGER FINGER (Right)       Surgeon(s) and Role:     * Billy Meyer MD - Primary    Assisting Surgeon: None    Pre-Operative Diagnosis: Carpal tunnel syndrome on both sides [G56.03]  Trigger finger of right thumb [M65.311]    Post-Operative Diagnosis: Post-Op Diagnosis Codes:     * Carpal tunnel syndrome on both sides [G56.03]     * Trigger finger of right thumb [M65.311]    Anesthesia:  Local with sedation    Technical Procedures Used:  right carpal tunnel release    Description of the Findings of the Procedure:  The patient taken the operating room where 10 cc of 2% plain lidocaine use local anesthetic about the volar aspect of the right wrist. Satisfactory anesthesia had been achieved the right hand was prepped and draped usual sterile fashion.  After exsanguination of the extremity a proximal tourniquet was inflated to 250 mm of mercury after the patient received 2 g of Ancef intravenously preoperatively.  At this time a longitudinal incision was made in line with 4th ray over the transverse carpal ligament.  The incision extended using blunt sharp dissection using 3.5 loupe magnification.  The transverse carpal ligament identified and sharply incised under direct vision.  Complete release was performed and verified by the surgeon small finger both proximally distally. No additional pathology was encountered satisfactory release had been achieved a cc of Kenalog split topically skin was closed using interrupted 4-0 nylon suture. At this time attention was directed to the volar aspect of the right thumb.  An oblique incision was made over the A1 pulley.  The radial ulnar neurovascular bundles were identified and carefully retracted.  The A1 pulley was identified and sharply incised under direct vision.  Satisfactory release had been achieved skin was closed using interrupted 4 0 nylon suture. Xeroform gauze 4 by 4s and 2 ABD pads were overwrapped with a 3 in gauze dressing.  The patient tolerated the procedure well was transferred to the recovery room in satisfactory condition.      Complications: No    Estimated Blood Loss (EBL): 5cc                        Condition: Good    Disposition: PACU - hemodynamically stable.    Attestation: I was present and scrubbed for the entire procedure.

## 2019-09-05 NOTE — INTERVAL H&P NOTE
The patient has been examined and the H&P has been reviewed:    I concur with the findings and no changes have occurred since H&P was written.    Anesthesia/Surgery risks, benefits and alternative options discussed and understood by patient/family.          Active Hospital Problems    Diagnosis  POA    Carpal tunnel syndrome on both sides [G56.03]  Yes     Priority: 1 - High    Trigger thumb [M65.319]  Unknown      Resolved Hospital Problems   No resolved problems to display.

## 2019-09-05 NOTE — ANESTHESIA POSTPROCEDURE EVALUATION
Anesthesia Post Evaluation    Patient: Alea Terrazas    Procedure(s) Performed: Procedure(s) (LRB):  RELEASE, CARPAL TUNNEL (Right)  RELEASE, TRIGGER FINGER (Right)    Final Anesthesia Type: general  Patient location during evaluation: PACU  Patient participation: Yes- Able to Participate  Level of consciousness: awake and alert and oriented  Post-procedure vital signs: reviewed and stable  Pain management: adequate  Airway patency: patent  PONV status at discharge: No PONV  Anesthetic complications: no      Cardiovascular status: hemodynamically stable  Respiratory status: unassisted, spontaneous ventilation and room air  Hydration status: euvolemic  Follow-up not needed.          Vitals Value Taken Time   /68 9/5/2019  8:27 AM   Temp 36.4 °C (97.5 °F) 9/5/2019  7:56 AM   Pulse 56 9/5/2019  8:29 AM   Resp 22 9/5/2019  8:29 AM   SpO2 95 % 9/5/2019  8:29 AM   Vitals shown include unvalidated device data.      No case tracking events are documented in the log.      Pain/Justina Score: Pain Rating Prior to Med Admin: 5 (9/5/2019  8:15 AM)  Justina Score: 10 (9/5/2019  8:15 AM)

## 2019-09-10 ENCOUNTER — OFFICE VISIT (OUTPATIENT)
Dept: ORTHOPEDICS | Facility: CLINIC | Age: 64
End: 2019-09-10
Payer: MEDICARE

## 2019-09-10 VITALS
SYSTOLIC BLOOD PRESSURE: 107 MMHG | BODY MASS INDEX: 27 KG/M2 | DIASTOLIC BLOOD PRESSURE: 64 MMHG | HEIGHT: 67 IN | WEIGHT: 172 LBS | HEART RATE: 76 BPM

## 2019-09-10 DIAGNOSIS — M65.311 TRIGGER FINGER OF RIGHT THUMB: Primary | ICD-10-CM

## 2019-09-10 DIAGNOSIS — G56.03 CARPAL TUNNEL SYNDROME ON BOTH SIDES: ICD-10-CM

## 2019-09-10 PROCEDURE — 99999 PR PBB SHADOW E&M-EST. PATIENT-LVL III: ICD-10-PCS | Mod: PBBFAC,,, | Performed by: ORTHOPAEDIC SURGERY

## 2019-09-10 PROCEDURE — 99999 PR PBB SHADOW E&M-EST. PATIENT-LVL III: CPT | Mod: PBBFAC,,, | Performed by: ORTHOPAEDIC SURGERY

## 2019-09-10 PROCEDURE — 99024 POSTOP FOLLOW-UP VISIT: CPT | Mod: S$GLB,,, | Performed by: ORTHOPAEDIC SURGERY

## 2019-09-10 PROCEDURE — 99024 PR POST-OP FOLLOW-UP VISIT: ICD-10-PCS | Mod: S$GLB,,, | Performed by: ORTHOPAEDIC SURGERY

## 2019-09-10 NOTE — PROGRESS NOTES
Subjective:     Patient ID: Alea Terrazas is a 63 y.o. female.    Chief Complaint: Post-op Evaluation of the Right Hand    The patient is a 63-year-old female who is status post right carpal tunnel release and right trigger thumb release date of surgery was 09/05/2019.  She seems to be doing quite well.      Past Medical History:   Diagnosis Date    Anxiety     Arthritis     Carpal tunnel syndrome     Chronic neck and back pain     Hypertension     Trigger thumb 9/4/2019     Past Surgical History:   Procedure Laterality Date    CARPAL TUNNEL RELEASE Left     CERVICAL FUSION  2016, 2017    RELEASE, CARPAL TUNNEL Right 9/5/2019    Performed by Billy Meyer [facility] at [facility]    RELEASE, TRIGGER FINGER Right 9/5/2019    [facility] at [facility]    ROTATOR CUFF REPAIR Right      Family History   Problem Relation Age of Onset    Cancer Father     Diabetes Father     COPD Mother 41    Kidney failure Mother      Social History     Socioeconomic History    Marital status:      Spouse name: Not on file    Number of children: Not on file    Years of education: Not on file    Highest education level: Not on file   Occupational History    Not on file   Social Needs    Financial resource strain: Not on file    Food insecurity:     Worry: Not on file     Inability: Not on file    Transportation needs:     Medical: Not on file     Non-medical: Not on file   Tobacco Use    Smoking status: Current Some Day Smoker     Packs/day: 0.50     Years: 41.00     Pack years: 20.50     Types: Cigarettes    Smokeless tobacco: Never Used   Substance and Sexual Activity    Alcohol use: Yes     Frequency: Monthly or less     Comment: occasionally    Drug use: No    Sexual activity: Not on file   Lifestyle    Physical activity:     Days per week: Not on file     Minutes per session: Not on file    Stress: Not on file   Relationships    Social connections:     Talks on phone: Not on  file     Gets together: Not on file     Attends Hoahaoism service: Not on file     Active member of club or organization: Not on file     Attends meetings of clubs or organizations: Not on file     Relationship status: Not on file   Other Topics Concern    Not on file   Social History Narrative    Not on file     Medication List with Changes/Refills   Current Medications    AQUANAZ 10- MG TAB    Take 1 tablet by mouth every 6 (six) hours as needed.     CETIRIZINE (ZYRTEC) 10 MG TABLET    Take 10 mg by mouth once daily.     CYCLOBENZAPRINE (FLEXERIL) 10 MG TABLET    Take 10 mg by mouth 3 (three) times daily as needed.     ERGOCALCIFEROL (ERGOCALCIFEROL) 50,000 UNIT CAP    Take 50,000 Units by mouth every 7 days.     FLUTICASONE (FLONASE) 50 MCG/ACTUATION NASAL SPRAY    2 sprays by Each Nare route once daily.    FUROSEMIDE (LASIX) 20 MG TABLET    Take 20 mg by mouth once daily.    GABAPENTIN (NEURONTIN) 300 MG CAPSULE    Take 300 mg by mouth daily as needed.     HYDROCODONE-ACETAMINOPHEN (NORCO) 5-325 MG PER TABLET    Take 1 tablet by mouth every 6 (six) hours as needed for Pain.    LORATADINE (CLARITIN) 10 MG TABLET    Take 10 mg by mouth once daily.    MELOXICAM (MOBIC) 15 MG TABLET    Take 15 mg by mouth once daily.    MONTELUKAST (SINGULAIR) 10 MG TABLET    Take 10 mg by mouth daily as needed.     NICOTINE (NICODERM CQ) 7 MG/24 HR    Place 1 patch onto the skin once daily.    OXYCODONE-ACETAMINOPHEN (PERCOCET) 5-325 MG PER TABLET    Take 1 tablet by mouth every 8 (eight) hours.    POTASSIUM CHLORIDE SA (K-DUR,KLOR-CON) 20 MEQ TABLET    Take 20 mEq by mouth once daily.    TRIAMTERENE-HYDROCHLOROTHIAZIDE 37.5-25 MG (DYAZIDE) 37.5-25 MG PER CAPSULE    Take 1 capsule by mouth once daily.     Review of patient's allergies indicates:  No Known Allergies  Review of Systems   Constitution: Negative for malaise/fatigue.   HENT: Negative for hearing loss.    Eyes: Negative for double vision and visual disturbance.    Cardiovascular: Negative for chest pain.   Respiratory: Negative for shortness of breath.    Endocrine: Negative for cold intolerance.   Hematologic/Lymphatic: Does not bruise/bleed easily.   Skin: Negative for poor wound healing and suspicious lesions.   Musculoskeletal: Positive for back pain and neck pain. Negative for gout, joint pain and joint swelling.   Gastrointestinal: Negative for nausea and vomiting.   Genitourinary: Negative for dysuria.   Neurological: Positive for numbness, paresthesias and sensory change.   Psychiatric/Behavioral: Negative for depression, memory loss and substance abuse. The patient is not nervous/anxious.    Allergic/Immunologic: Negative for persistent infections.       Objective:   Body mass index is 26.94 kg/m².  Vitals:    09/10/19 0937   BP: 107/64   Pulse: 76                General    Constitutional: She is oriented to person, place, and time. She appears well-developed and well-nourished. No distress.   HENT:   Head: Normocephalic.   Eyes: EOM are normal. Pupils are equal, round, and reactive to light.   Neck: Normal range of motion.   Pulmonary/Chest: Effort normal.   Neurological: She is oriented to person, place, and time.   Psychiatric: She has a normal mood and affect.             Right Hand/Wrist Exam     Inspection   Scars: Wrist - present     Other     Neuorologic Exam    Median Distribution: normal  Ulnar Distribution: normal  Radial Distribution: normal    Comments:  The patient is status post right carpal tunnel release and right trigger thumb release.  The suture line is intact both incisions.  There is no sign of infection.  There are no motor or sensory deficits.          Vascular Exam       Capillary Refill  Right Hand: normal capillary refill      Relevant imaging results reviewed and interpreted by me, discussed with the patient and / or family today radiographs were not obtained today.  Assessment:     Encounter Diagnoses   Name Primary?    Trigger finger  of right thumb Yes    Carpal tunnel syndrome on both sides         Plan:     The patient had the wound redressed.  Wound care was discussed. She will return in 1 week for suture removal.                Disclaimer: This note was prepared using a voice recognition system and is likely to have sound alike errors within the text.

## 2019-09-18 ENCOUNTER — OFFICE VISIT (OUTPATIENT)
Dept: ORTHOPEDICS | Facility: CLINIC | Age: 64
End: 2019-09-18
Payer: MEDICARE

## 2019-09-18 VITALS
WEIGHT: 172 LBS | HEART RATE: 56 BPM | DIASTOLIC BLOOD PRESSURE: 80 MMHG | SYSTOLIC BLOOD PRESSURE: 118 MMHG | BODY MASS INDEX: 27 KG/M2 | HEIGHT: 67 IN

## 2019-09-18 DIAGNOSIS — G56.03 CARPAL TUNNEL SYNDROME ON BOTH SIDES: ICD-10-CM

## 2019-09-18 DIAGNOSIS — M65.311 TRIGGER FINGER OF RIGHT THUMB: Primary | ICD-10-CM

## 2019-09-18 PROCEDURE — 99024 PR POST-OP FOLLOW-UP VISIT: ICD-10-PCS | Mod: S$GLB,,, | Performed by: ORTHOPAEDIC SURGERY

## 2019-09-18 PROCEDURE — 99024 POSTOP FOLLOW-UP VISIT: CPT | Mod: S$GLB,,, | Performed by: ORTHOPAEDIC SURGERY

## 2019-09-18 PROCEDURE — 99999 PR PBB SHADOW E&M-EST. PATIENT-LVL III: CPT | Mod: PBBFAC,,, | Performed by: ORTHOPAEDIC SURGERY

## 2019-09-18 PROCEDURE — 99999 PR PBB SHADOW E&M-EST. PATIENT-LVL III: ICD-10-PCS | Mod: PBBFAC,,, | Performed by: ORTHOPAEDIC SURGERY

## 2019-12-19 ENCOUNTER — CLINICAL SUPPORT (OUTPATIENT)
Dept: SMOKING CESSATION | Facility: CLINIC | Age: 64
End: 2019-12-19
Payer: COMMERCIAL

## 2019-12-19 DIAGNOSIS — F17.200 NICOTINE DEPENDENCE: Primary | ICD-10-CM

## 2019-12-19 PROCEDURE — 99407 PR TOBACCO USE CESSATION INTENSIVE >10 MINUTES: ICD-10-PCS | Mod: S$GLB,,,

## 2019-12-19 PROCEDURE — 99407 BEHAV CHNG SMOKING > 10 MIN: CPT | Mod: S$GLB,,,

## 2019-12-19 NOTE — PROGRESS NOTES
Called pt to f/u on her 6 and 12 month smoking cessation quit status. Pt stated she was tobacco free, bur relapsed since they buried her dad yesterday. Informed her she has benefits available and is able to rejoin. Pt scheduled appointment for quit #4 on 1/14/2020. Informed her of benefit period, phone follow ups, and contact information. Will complete smart form and resolve quit #2 episode. Completed 3 and 6 month smart form for quit #3 episode and will continue to follow up on quit #4 episode.

## 2020-07-29 ENCOUNTER — OFFICE VISIT (OUTPATIENT)
Dept: ORTHOPEDICS | Facility: CLINIC | Age: 65
End: 2020-07-29
Payer: MEDICARE

## 2020-07-29 VITALS
HEIGHT: 67 IN | BODY MASS INDEX: 27 KG/M2 | SYSTOLIC BLOOD PRESSURE: 138 MMHG | WEIGHT: 172 LBS | HEART RATE: 65 BPM | DIASTOLIC BLOOD PRESSURE: 88 MMHG

## 2020-07-29 DIAGNOSIS — G56.21 CUBITAL TUNNEL SYNDROME ON RIGHT: Primary | ICD-10-CM

## 2020-07-29 PROCEDURE — 3008F PR BODY MASS INDEX (BMI) DOCUMENTED: ICD-10-PCS | Mod: CPTII,S$GLB,, | Performed by: ORTHOPAEDIC SURGERY

## 2020-07-29 PROCEDURE — 99213 PR OFFICE/OUTPT VISIT, EST, LEVL III, 20-29 MIN: ICD-10-PCS | Mod: S$GLB,,, | Performed by: ORTHOPAEDIC SURGERY

## 2020-07-29 PROCEDURE — 99999 PR PBB SHADOW E&M-EST. PATIENT-LVL III: ICD-10-PCS | Mod: PBBFAC,,, | Performed by: ORTHOPAEDIC SURGERY

## 2020-07-29 PROCEDURE — 3008F BODY MASS INDEX DOCD: CPT | Mod: CPTII,S$GLB,, | Performed by: ORTHOPAEDIC SURGERY

## 2020-07-29 PROCEDURE — 99999 PR PBB SHADOW E&M-EST. PATIENT-LVL III: CPT | Mod: PBBFAC,,, | Performed by: ORTHOPAEDIC SURGERY

## 2020-07-29 PROCEDURE — 99213 OFFICE O/P EST LOW 20 MIN: CPT | Mod: S$GLB,,, | Performed by: ORTHOPAEDIC SURGERY

## 2020-07-29 RX ORDER — AZITHROMYCIN 250 MG/1
TABLET, FILM COATED ORAL
COMMUNITY
Start: 2020-06-03 | End: 2020-09-15

## 2020-07-29 RX ORDER — ALBUTEROL SULFATE 90 UG/1
AEROSOL, METERED RESPIRATORY (INHALATION)
COMMUNITY
Start: 2020-05-19

## 2020-07-29 RX ORDER — CLONIDINE HYDROCHLORIDE 0.1 MG/1
TABLET ORAL
COMMUNITY
Start: 2018-07-13 | End: 2020-09-15

## 2020-07-29 RX ORDER — BENZONATATE 200 MG/1
200 CAPSULE ORAL 3 TIMES DAILY PRN
COMMUNITY
End: 2020-09-15

## 2020-07-29 NOTE — PROGRESS NOTES
Subjective:     Patient ID: Alea Terrazas is a 64 y.o. female.    Chief Complaint: Pain and Numbness of the Right Hand    The patient is a 64-year-old female status post right carpal tunnel release date of surgery was 09/05/2019.  She also had a trigger thumb release.  That is doing well.  She complains of numbness of the ulnar 3 digits right hand for the last several months.  She was initially better after her right carpal tunnel release.      Past Medical History:   Diagnosis Date    Anxiety     Arthritis     Carpal tunnel syndrome     Chronic neck and back pain     Hypertension     Trigger thumb 9/4/2019     Past Surgical History:   Procedure Laterality Date    CARPAL TUNNEL RELEASE Left     CARPAL TUNNEL RELEASE Right 9/5/2019    Procedure: RELEASE, CARPAL TUNNEL;  Surgeon: Billy Meyer MD;  Location: [facility] OR;  Service: Orthopedics;  Laterality: Right;  Confirmed anesthesia type with CRNA.    CERVICAL FUSION  2016, 2017    ROTATOR CUFF REPAIR Right     TRIGGER FINGER RELEASE Right 9/5/2019    Procedure: RELEASE, TRIGGER FINGER;  Surgeon: Billy Meyer MD;  Location: [facility] OR;  Service: Orthopedics;  Laterality: Right;  right thumb   Confirmed anesthesia type with CRNA.     Family History   Problem Relation Age of Onset    Cancer Father     Diabetes Father     COPD Mother 41    Kidney failure Mother      Social History     Socioeconomic History    Marital status:      Spouse name: Not on file    Number of children: Not on file    Years of education: Not on file    Highest education level: Not on file   Occupational History    Not on file   Social Needs    Financial resource strain: Not on file    Food insecurity     Worry: Not on file     Inability: Not on file    Transportation needs     Medical: Not on file     Non-medical: Not on file   Tobacco Use    Smoking status: Current Some Day Smoker     Packs/day: 0.50     Years: 41.00     Pack years: 20.50     Types:  Cigarettes    Smokeless tobacco: Never Used   Substance and Sexual Activity    Alcohol use: Yes     Frequency: Monthly or less     Comment: occasionally    Drug use: No    Sexual activity: Not on file   Lifestyle    Physical activity     Days per week: Not on file     Minutes per session: Not on file    Stress: Not on file   Relationships    Social connections     Talks on phone: Not on file     Gets together: Not on file     Attends Evangelical service: Not on file     Active member of club or organization: Not on file     Attends meetings of clubs or organizations: Not on file     Relationship status: Not on file   Other Topics Concern    Not on file   Social History Narrative    Not on file     Medication List with Changes/Refills   Current Medications    ALBUTEROL (PROVENTIL/VENTOLIN HFA) 90 MCG/ACTUATION INHALER    INHALE 1 TO 2 PUFFS BY MOUTH INTO THE LUNGS EVERY 4-6 HOURS AS NEEDED    AQUANAZ 10- MG TAB    Take 1 tablet by mouth every 6 (six) hours as needed.     AZITHROMYCIN (Z-PETTY) 250 MG TABLET        BENZONATATE (TESSALON) 200 MG CAPSULE    Take 200 mg by mouth 3 (three) times daily as needed.    CETIRIZINE (ZYRTEC) 10 MG TABLET    Take 10 mg by mouth once daily.     CLONIDINE (CATAPRES) 0.1 MG TABLET        CYCLOBENZAPRINE (FLEXERIL) 10 MG TABLET    Take 10 mg by mouth 3 (three) times daily as needed.     ERGOCALCIFEROL (ERGOCALCIFEROL) 50,000 UNIT CAP    Take 50,000 Units by mouth every 7 days.     FLUTICASONE (FLONASE) 50 MCG/ACTUATION NASAL SPRAY    2 sprays by Each Nare route once daily.    FUROSEMIDE (LASIX) 20 MG TABLET    Take 20 mg by mouth once daily.    GABAPENTIN (NEURONTIN) 300 MG CAPSULE    Take 300 mg by mouth daily as needed.     HYDROCODONE-ACETAMINOPHEN (NORCO) 5-325 MG PER TABLET    Take 1 tablet by mouth every 6 (six) hours as needed for Pain.    LORATADINE (CLARITIN) 10 MG TABLET    Take 10 mg by mouth once daily.    MELOXICAM (MOBIC) 15 MG TABLET    Take 15 mg by mouth  once daily.    MONTELUKAST (SINGULAIR) 10 MG TABLET    Take 10 mg by mouth daily as needed.     NICOTINE (NICODERM CQ) 7 MG/24 HR    Place 1 patch onto the skin once daily.    OXYCODONE-ACETAMINOPHEN (PERCOCET) 5-325 MG PER TABLET    Take 1 tablet by mouth every 8 (eight) hours.    POTASSIUM CHLORIDE SA (K-DUR,KLOR-CON) 20 MEQ TABLET    Take 20 mEq by mouth once daily.    TRIAMTERENE-HYDROCHLOROTHIAZIDE 37.5-25 MG (DYAZIDE) 37.5-25 MG PER CAPSULE    Take 1 capsule by mouth once daily.     Review of patient's allergies indicates:  No Known Allergies  Review of Systems   Constitution: Negative for malaise/fatigue.   HENT: Negative for hearing loss.    Eyes: Negative for double vision and visual disturbance.   Cardiovascular: Negative for chest pain.   Respiratory: Negative for shortness of breath.    Endocrine: Negative for cold intolerance.   Hematologic/Lymphatic: Does not bruise/bleed easily.   Skin: Negative for poor wound healing and suspicious lesions.   Musculoskeletal: Positive for back pain and neck pain. Negative for gout, joint pain and joint swelling.   Gastrointestinal: Negative for nausea and vomiting.   Genitourinary: Negative for dysuria.   Neurological: Positive for numbness, paresthesias and sensory change.   Psychiatric/Behavioral: Negative for depression, memory loss and substance abuse. The patient is not nervous/anxious.    Allergic/Immunologic: Negative for persistent infections.       Objective:   Body mass index is 26.94 kg/m².  Vitals:    07/29/20 0946   BP: 138/88   Pulse: 65                General    Constitutional: She is oriented to person, place, and time. She appears well-developed and well-nourished. No distress.   HENT:   Head: Normocephalic.   Eyes: EOM are normal.   Neck: Normal range of motion.   Pulmonary/Chest: Effort normal.   Neurological: She is oriented to person, place, and time.   Psychiatric: She has a normal mood and affect.             Right Hand/Wrist Exam      Inspection   Scars: Wrist - present Hand -  present  Effusion: Wrist - absent Hand -  present    Tenderness   The patient is tender to palpation of the ulnar area.    Tests   Phalens Sign: negative  Tinel's sign (median nerve): negative  Carpal Tunnel Compression Test: negative  Cubital Tunnel Compression Test: positive    Atrophy   Thenar:  negative  Intrinsic:  negative    Other     Neuorologic Exam    Median Distribution: normal  Ulnar Distribution: normal  Radial Distribution: abnormal    Comments:  The patient has a positive Tinel sign over the ulnar nerve in the right elbow.  There is a well-healed carpal tunnel scar.  There is no thenar or intrinsic atrophy noted.  There are no motor or sensory deficits.      Right Elbow Exam     Tests   Tinel's sign (cubital tunnel): positive          Vascular Exam       Capillary Refill  Right Hand: normal capillary refill      Relevant imaging results reviewed and interpreted by me, discussed with the patient and / or family today radiographs were not obtained today  Assessment:     Encounter Diagnosis   Name Primary?    Cubital tunnel syndrome on right Yes    status post right carpal tunnel release    Plan:     The patient is sent repeat EMG and nerve conduction studies right upper extremity she will return with that study.                Disclaimer: This note was prepared using a voice recognition system and is likely to have sound alike errors within the text.

## 2020-08-20 ENCOUNTER — OFFICE VISIT (OUTPATIENT)
Dept: PHYSICAL MEDICINE AND REHAB | Facility: CLINIC | Age: 65
End: 2020-08-20
Payer: MEDICARE

## 2020-08-20 VITALS
WEIGHT: 172 LBS | HEIGHT: 67 IN | SYSTOLIC BLOOD PRESSURE: 136 MMHG | DIASTOLIC BLOOD PRESSURE: 96 MMHG | HEART RATE: 73 BPM | BODY MASS INDEX: 27 KG/M2

## 2020-08-20 DIAGNOSIS — G56.21 CUBITAL TUNNEL SYNDROME ON RIGHT: ICD-10-CM

## 2020-08-20 DIAGNOSIS — M54.12 CERVICAL RADICULOPATHY: Primary | ICD-10-CM

## 2020-08-20 PROBLEM — G56.03 CARPAL TUNNEL SYNDROME ON BOTH SIDES: Status: RESOLVED | Noted: 2019-08-07 | Resolved: 2020-08-20

## 2020-08-20 PROCEDURE — 3008F PR BODY MASS INDEX (BMI) DOCUMENTED: ICD-10-PCS | Mod: CPTII,S$GLB,, | Performed by: PHYSICAL MEDICINE & REHABILITATION

## 2020-08-20 PROCEDURE — 99999 PR PBB SHADOW E&M-EST. PATIENT-LVL III: ICD-10-PCS | Mod: PBBFAC,,, | Performed by: PHYSICAL MEDICINE & REHABILITATION

## 2020-08-20 PROCEDURE — 99214 PR OFFICE/OUTPT VISIT, EST, LEVL IV, 30-39 MIN: ICD-10-PCS | Mod: 25,S$GLB,, | Performed by: PHYSICAL MEDICINE & REHABILITATION

## 2020-08-20 PROCEDURE — 95885 PR MUSC TST DONE W/NERV TST LIM: ICD-10-PCS | Mod: S$GLB,,, | Performed by: PHYSICAL MEDICINE & REHABILITATION

## 2020-08-20 PROCEDURE — 95885 MUSC TST DONE W/NERV TST LIM: CPT | Mod: S$GLB,,, | Performed by: PHYSICAL MEDICINE & REHABILITATION

## 2020-08-20 PROCEDURE — 3008F BODY MASS INDEX DOCD: CPT | Mod: CPTII,S$GLB,, | Performed by: PHYSICAL MEDICINE & REHABILITATION

## 2020-08-20 PROCEDURE — 99214 OFFICE O/P EST MOD 30 MIN: CPT | Mod: 25,S$GLB,, | Performed by: PHYSICAL MEDICINE & REHABILITATION

## 2020-08-20 PROCEDURE — 99999 PR PBB SHADOW E&M-EST. PATIENT-LVL III: CPT | Mod: PBBFAC,,, | Performed by: PHYSICAL MEDICINE & REHABILITATION

## 2020-08-20 PROCEDURE — 95911 NRV CNDJ TEST 9-10 STUDIES: CPT | Mod: S$GLB,,, | Performed by: PHYSICAL MEDICINE & REHABILITATION

## 2020-08-20 PROCEDURE — 95911 PR NERVE CONDUCTION STUDY; 9-10 STUDIES: ICD-10-PCS | Mod: S$GLB,,, | Performed by: PHYSICAL MEDICINE & REHABILITATION

## 2020-08-20 RX ORDER — HYDROCHLOROTHIAZIDE 25 MG/1
25 TABLET ORAL DAILY
COMMUNITY
Start: 2020-06-25

## 2020-08-20 RX ORDER — MICONAZOLE NITRATE 2 %
2 CREAM (GRAM) TOPICAL
COMMUNITY
Start: 2018-05-04 | End: 2020-09-15

## 2020-08-20 RX ORDER — IBUPROFEN 800 MG/1
800 TABLET ORAL EVERY 6 HOURS PRN
COMMUNITY
End: 2020-09-15

## 2020-08-20 RX ORDER — FLUCONAZOLE 150 MG/1
TABLET ORAL
COMMUNITY
Start: 2020-06-28 | End: 2020-09-15

## 2020-08-20 NOTE — LETTER
August 20, 2020      Billy Meyer MD  [address] [place] [address]           [facility]  [address] [facility] [address]  Phone: [phone]  Fax: [phone]          Patient: Alea Terrazas   MR Number: [id number]   YOB: 1955   Date of Visit: 8/20/2020       Dear Dr. Billy Meyer:    Thank you for referring Alea Terrazas to me for evaluation. Attached you will find relevant portions of my assessment and plan of care.    If you have questions, please do not hesitate to call me. I look forward to following Alea Terrazas along with you.    Sincerely,    Cande Drummond MD    Enclosure  CC:  No Recipients    If you would like to receive this communication electronically, please contact [email or web address] or [phone] to request more information on [employer] access.    For providers and/or their staff who would like to refer a patient to [facility], please contact us through our one-stop-shop provider referral line, [facility], at [phone].    If you feel you have received this communication in error or would no longer like to receive these types of communications, please e-mail [email or web address]

## 2020-08-20 NOTE — PATIENT INSTRUCTIONS
What Is Cubital Tunnel Syndrome?  Cubital tunnel syndrome is a set of symptoms that may occur if the ulnar nerve in your elbow gets pinched. This may happen if you bend or lean on your elbows often.    Your cubital tunnel  The cubital tunnel is a groove in a bone near your elbow. This narrow groove provides a passage for the ulnar nerve, one of the main nerves in your arm. The ulnar nerve can cause funny bone pain if your elbow gets bumped. Your cubital tunnel helps protect this nerve as it passes through your elbow and down to your fingers.  Compressing the ulnar nerve  Bending your elbow compresses the ulnar nerve inside the cubital tunnel. The nerve can get inflamed (irritated) after constant bending and pinching or after getting hurt. Over time, this can lead to pain or numbness. The pain is often felt in your ring fingers and little fingers.     Bending your elbow as you hold a phone can cause problems over time.    What are its symptoms?  · Numbness or tingling in the ring fingers and little fingers  · Loss of finger or hand strength  · Inability to straighten fingers  · Sharp, sudden pain when elbow is touched  The road to healing  You can keep cubital tunnel syndrome from flaring up. Avoid pinching the ulnar nerve by keeping your arm straight as much as you can, even while sleeping. And use phone headsets and elbow pads. If you still have pain, tell your doctor.   Date Last Reviewed: 9/8/2015  © 6718-1692 [employer]. [address] [place] [address], [place] [address]. All rights reserved. This information is not intended as a substitute for professional medical care. Always follow your healthcare professional's instructions.        Nonsurgical Treatment of Cervical Spine Problems  Cervical spine problems can often be treated without surgery. Choices include rest, medicines, or injections. Your healthcare provider may also suggest physical therapy or certain exercises. These may all help to relieve  your symptoms. These treatments are often successful. But if your symptoms dont subside, talk to your healthcare provider. He or she may tell you that surgery is your best choice.  Relieving your symptoms  Your healthcare provider may recommend:  · Medicines. These help to reduce pain and inflammation.  · Epidural steroid injections. These are injections into the spinal canal near the spinal cord. They may relieve severe pain and reduce inflammation.  · Restricting aggravating activities. This can help to give your cervical spine time to heal.  · A soft cervical collar. This can help to support your head while keeping your cervical spine aligned.  · Traction. This may help relieve pressure on the irritated nerves.  Restoring mobility and strength  Your healthcare provider may recommend that you work with a physical therapist. This can help you regain mobility and strength in your neck. Physical therapy may last for 4 to 8 weeks. It may include:  · Exercises to improve your necks range of motion and strength.  · Evaluation and correction of posture and body movements. This can correct problems that affect your cervical spine.  · Heat, massage, and traction to help relieve your symptoms.  Self-care  Youll take an active role in your own therapy. To protect your neck from further injury:  · Follow any exercise program given to you by your healthcare provider or physical therapist.  · Practice good posture while sitting, standing, or moving.  · Have your workspace evaluated. Rearrange it if needed.  · When lying down, support your neck. You can use a special cervical pillow or rolled-up towel.   Date Last Reviewed: 10/5/2015  © 0013-1705 [employer]. [address] [place] [address], [place] [address]. All rights reserved. This information is not intended as a substitute for professional medical care. Always follow your healthcare professional's instructions.        Understanding Cervical  Radiculopathy    Cervical radiculopathy is irritation or inflammation of a nerve root in the neck. It causes neck pain and other symptoms that may spread into the chest or down the arm. To understand this condition, it helps to understand the parts of the spine:  · Vertebrae. These are bones that stack to form the spine. The cervical spine contains the 7 vertebrae in the neck.  · Disks. These are soft pads of tissue between the vertebrae. They act as shock absorbers for the spine.  · The spinal canal. This is a tunnel formed within the stacked vertebrae. The spinal cord runs through this canal.  · Nerves. These branch off the spinal cord. As they leave the spinal canal, nerves pass through openings between the vertebrae. The nerve root is the part of the nerve that is closest to the spinal cord.   With cervical radiculopathy, nerve roots in the neck become irritated. This leads to pain and symptoms that can travel to the nerves that go from the spinal cord down the arms and into the torso.  What causes cervical radiculopathy?  Aging, injury, poor posture, and other issues can lead to problems in the neck. These problems may then irritate nerve roots. These include:  · Damage to a disk in the cervical spine. The damaged disk may then press on nearby nerve roots.  · Degeneration from wear and tear, and aging. This can lead to narrowing (stenosis) of the openings between the vertebrae. The narrowed openings press on nerve roots as they leave the spinal canal.  · An unstable spine. This is when a vertebra slips forward. It can then press on a nerve root.  There are other, less common causes of pressure on nerves in the neck. These include infection, cysts, and tumors.  Symptoms of cervical radiculopathy  These include:  · Neck pain  · Pain, numbness, tingling, or weakness that travels down the arm  · Loss of neck movement  · Muscle spasms  Treatment for cervical radiculopathy  In most cases, your healthcare provider  will first try treatments that help relieve symptoms. These may include:  · Prescription or over-the-counter pain medicines. These help relieve pain and swelling.  · Cold packs. These help reduce pain.  · Resting. This involves avoiding positions and activities that increase pain.  · Neck brace (cervical collar). This can help relieve inflammation and pain.  · Physical therapy, including exercises and stretches. This can help decrease pain and increase movement and function.  · Shots of medicinesaround the nerve roots. This is done to help relieve symptoms for a time.  In some cases, your healthcare provider may advise surgery to fix the underlying problem. This depends on the cause, the symptoms, and how long the pain has lasted.  Possible complications  Over time, an irritated and inflamed nerve may become damaged. This may lead to long-lasting (permanent) numbness or weakness. If symptoms change suddenly or get worse, be sure to let your healthcare provider know.     When to call your healthcare provider  Call your healthcare provider right away if you have any of these:  · New pain or pain that gets worse  · New or increasing weakness, numbness, or tingling in your arm or hand  · Bowel or bladder changes   Date Last Reviewed: 3/10/2016  © 8893-7494 [employer]. [address] [place] [address], [place] [address]. All rights reserved. This information is not intended as a substitute for professional medical care. Always follow your healthcare professional's instructions.

## 2020-08-20 NOTE — PROGRESS NOTES
[facility]   [address] [facility]  [place] [address]  Phone: [phone]        Full Name: romi guzman YOB: 1955  Patient ID: [id number]      Visit Date: 8/20/2020 09:47  Age: 64 Years 8 Months Old  Examining Physician: Cande Drummond M.D.  Referring Physician:   Reason for Referral: uex pain      Chief Complaint   Patient presents with    Numbness     hand       HPI: This is a 64 y.o.  female being seen in clinic today for re- evaluation of chronic right hand pain and numbness-mainly in ulnar distribution now.  Over the past month, her symptoms have worsened-now awakening with numbness.  Increased arm usage or driving, her symptoms can worsen.  Shaking her arm or resting provides some relief.  She was diagnosed with CTS in 2018 and is s/p CTR 9/19 and cervical fusion    History obtained from patient    Past family, medical, social, and surgical history reviewed in chart    Review of Systems:     General- denies lethargy, weight change, fever, chills  Head/neck- denies swallowing difficulties  ENT- denies hearing changes  Cardiovascular-denies chest pain  Pulmonary- denies shortness of breath  GI- denies constipation or bowel incontinence  - denies bladder incontinence  Skin- denies wounds or rashes  Musculoskeletal- +weakness, +pain  Neurologic- +numbness and tingling  Psychiatric- denies depressive or psychotic features, +anxiety  Lymphatic-denies swelling  Endocrine- denies hypoglycemic symptoms/DM history  All other pertinent systems negative     Physical Examination:  General: Well developed, well nourished female, NAD  HEENT:NCAT EOMI bilaterally   Pulmonary:Normal respirations    Spinal Examination: CERVICAL  Active ROM is within normal limits.  Inspection: No deformity of spinal alignment.      Musculoskeletal Tests:  Phalen: neg  Elbow compression (ulnar): positive on right  Tinels at wrist: neg    Bilateral Upper and Lower Extremities:  Pulses are 2+ at radial  bilaterally.  Shoulder/Elbow/Wrist/Hand ROM wnl  Hip/Knee/Ankle ROM   Bilateral Extremities show normal capillary refill.  No signs of cyanosis, rubor, edema, skin changes, or dysvascular changes of appendages.  Nails appear intact.    Neurological Exam:  Cranial Nerves:  II-XII grossly intact    Manual Muscle Testing: (Motor 5=normal)  5/5 strength bilateral upper extremities     No focal atrophy is noted of either upper  extremity.    Bilateral Reflexes:1+bic tric br  Garcia's response is absent bilaterally.    Sensation: tested to light touch  - intact in arms except dec at right fingertips-mainly 3rd -5th  Gait: Narrow base and good arm swing.      Entire procedure explained to patient prior to proceeding.  Verbal consent obtained      SNC      Nerve / Sites Rec. Site Onset Lat Peak Lat Amp Segments Distance Velocity     ms ms µV  mm m/s   R Median - Digit II (Antidromic)      Wrist Dig II 2.7 3.5 20.8 Wrist - Dig  52   L Median - Digit II (Antidromic)      Wrist Dig II 2.8 3.6 24.3 Wrist - Dig  51   R Ulnar - Digit V (Antidromic)      Wrist Dig V 2.9 3.6 15.0 Wrist - Dig V 140 49   L Ulnar - Digit V (Antidromic)      Wrist Dig V 3.0 3.9 13.4 Wrist - Dig V 140 46   R Radial - Anatomical snuff box (Forearm)      Forearm Wrist 1.9 2.6 12.4 Forearm - Wrist 100 52   L Radial - Anatomical snuff box (Forearm)      Forearm Wrist 2.0 2.7 10.9 Forearm - Wrist 100 49       MNC      Nerve / Sites Muscle Latency Amplitude Duration Rel Amp Segments Distance Lat Diff Velocity     ms mV ms %  mm ms m/s   R Median - APB      Wrist APB 3.1 8.2 6.0 100 Wrist - APB 80        Elbow APB 7.1 6.6 7.0 80.5 Elbow - Wrist 200 4.0 50   L Median - APB      Wrist APB 3.1 10.7 6.3 100 Wrist - APB 80        Elbow APB 7.1 10.4 6.9 97 Elbow - Wrist 200 4.0 50   R Ulnar - ADM      Wrist ADM 2.7 8.0 7.3 100 Wrist - ADM 80        B.Elbow ADM 6.4 7.4 7.7 92.5 B.Elbow - Wrist 200 3.6 55      A.Elbow ADM 9.0 7.1 7.8 96 A.Elbow - B.Elbow  120 2.7 45         A.Elbow - Wrist  6.3    L Ulnar - ADM      Wrist ADM 2.7 7.7 7.2 100 Wrist - ADM 80        B.Elbow ADM 6.7 7.1 7.0 92.9 B.Elbow - Wrist 200 4.0 50      A.Elbow ADM 8.9 6.1 8.6 86 A.Elbow - B.Elbow 110 2.2 50         A.Elbow - Wrist  6.2        EMG         EMG Summary Table     Spontaneous MUAP Recruitment   Muscle IA Fib PSW Fasc Other Dur. Dur Amp Dur Polys Pattern Effort   R. First dorsal interosseous N None None None .   Incr Sl Incr 1+ sl red Max   R. Abductor pollicis brevis N None None None .   Sl Incr Sl Incr 1+ Reduced Max   R. Pronator teres N None None None .   N N N N Max                                   INTERPRETATION    -Bilateral median motor nerve conduction study showed normal latency, amplitude, and conduction velocity  -Bilateral median sensory nerve conduction study showed normal peak latency and amplitude  -Bilateral ulnar motor nerve conduction study showed normal latency, amplitude, and dec conduction velocity across the RIGHT elbow  -Bilateral ulnar sensory nerve conduction study showed normal peak latency and amplitude  -Bilateral radial sensory nerve conduction study showed normal peak latency and amplitude    IMPRESSION  1. ABNORMAL study  2. 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  There was a chronic radiculopathy of the C8 and T1 nerve roots    PLAN  1. Follow up with referring provider: Dr. Billy Young*  2. Handouts on cubital tunnel and cervical radic provided   3. This study is good for one year. If symptoms worsen or do not improve, please re-consult.    Cande Drummond M.D.  Physical Medicine and Rehab

## 2020-08-21 ENCOUNTER — OFFICE VISIT (OUTPATIENT)
Dept: ORTHOPEDICS | Facility: CLINIC | Age: 65
End: 2020-08-21
Payer: MEDICARE

## 2020-08-21 VITALS
BODY MASS INDEX: 27 KG/M2 | DIASTOLIC BLOOD PRESSURE: 79 MMHG | HEART RATE: 60 BPM | WEIGHT: 172 LBS | SYSTOLIC BLOOD PRESSURE: 116 MMHG | HEIGHT: 67 IN

## 2020-08-21 DIAGNOSIS — G56.21 CUBITAL TUNNEL SYNDROME ON RIGHT: Primary | ICD-10-CM

## 2020-08-21 PROCEDURE — 3008F BODY MASS INDEX DOCD: CPT | Mod: CPTII,S$GLB,, | Performed by: ORTHOPAEDIC SURGERY

## 2020-08-21 PROCEDURE — 99213 OFFICE O/P EST LOW 20 MIN: CPT | Mod: S$GLB,,, | Performed by: ORTHOPAEDIC SURGERY

## 2020-08-21 PROCEDURE — 3008F PR BODY MASS INDEX (BMI) DOCUMENTED: ICD-10-PCS | Mod: CPTII,S$GLB,, | Performed by: ORTHOPAEDIC SURGERY

## 2020-08-21 PROCEDURE — 99213 PR OFFICE/OUTPT VISIT, EST, LEVL III, 20-29 MIN: ICD-10-PCS | Mod: S$GLB,,, | Performed by: ORTHOPAEDIC SURGERY

## 2020-08-21 PROCEDURE — 99999 PR PBB SHADOW E&M-EST. PATIENT-LVL IV: CPT | Mod: PBBFAC,,, | Performed by: ORTHOPAEDIC SURGERY

## 2020-08-21 PROCEDURE — 99999 PR PBB SHADOW E&M-EST. PATIENT-LVL IV: ICD-10-PCS | Mod: PBBFAC,,, | Performed by: ORTHOPAEDIC SURGERY

## 2020-08-21 NOTE — PROGRESS NOTES
Subjective:     Patient ID: Alea Terrazas is a 64 y.o. female.    Chief Complaint: Pain and Numbness of the Right Wrist and Pain and Numbness of the Left Wrist    The patient is a 64-year-old female with right cuff cubital tunnel syndrome documented by nerve conduction studies.  She also has an element of cervical radiculopathy from C8-T1.  She has had 2 previous neck surgeries.  She has numbness in the right small finger and wishes to have a right cubital tunnel release.      Past Medical History:   Diagnosis Date    Anxiety     Arthritis     Carpal tunnel syndrome     Chronic neck and back pain     Hypertension     Trigger thumb 9/4/2019     Past Surgical History:   Procedure Laterality Date    CARPAL TUNNEL RELEASE Left     CARPAL TUNNEL RELEASE Right 9/5/2019    Procedure: RELEASE, CARPAL TUNNEL;  Surgeon: Billy Meyer MD;  Location: [facility] OR;  Service: Orthopedics;  Laterality: Right;  Confirmed anesthesia type with CRNA.    CERVICAL FUSION  2016, 2017    ROTATOR CUFF REPAIR Right     TRIGGER FINGER RELEASE Right 9/5/2019    Procedure: RELEASE, TRIGGER FINGER;  Surgeon: Billy Meyer MD;  Location: [facility] OR;  Service: Orthopedics;  Laterality: Right;  right thumb   Confirmed anesthesia type with CRNA.     Family History   Problem Relation Age of Onset    Cancer Father     Diabetes Father     COPD Mother 41    Kidney failure Mother      Social History     Socioeconomic History    Marital status:      Spouse name: Not on file    Number of children: Not on file    Years of education: Not on file    Highest education level: Not on file   Occupational History    Not on file   Social Needs    Financial resource strain: Not on file    Food insecurity     Worry: Not on file     Inability: Not on file    Transportation needs     Medical: Not on file     Non-medical: Not on file   Tobacco Use    Smoking status: Current Some Day Smoker     Packs/day: 0.50     Years: 41.00      Pack years: 20.50     Types: Cigarettes    Smokeless tobacco: Never Used   Substance and Sexual Activity    Alcohol use: Yes     Frequency: Monthly or less     Comment: occasionally    Drug use: No    Sexual activity: Not on file   Lifestyle    Physical activity     Days per week: Not on file     Minutes per session: Not on file    Stress: Not on file   Relationships    Social connections     Talks on phone: Not on file     Gets together: Not on file     Attends [employer] service: Not on file     Active member of club or organization: Not on file     Attends meetings of clubs or organizations: Not on file     Relationship status: Not on file   Other Topics Concern    Not on file   Social History Narrative    Not on file     Medication List with Changes/Refills   Current Medications    ALBUTEROL (PROVENTIL/VENTOLIN HFA) 90 MCG/ACTUATION INHALER    INHALE 1 TO 2 PUFFS BY MOUTH INTO THE LUNGS EVERY 4-6 HOURS AS NEEDED    AQUANAZ 10- MG TAB    Take 1 tablet by mouth every 6 (six) hours as needed.     AZITHROMYCIN (Z-PETTY) 250 MG TABLET        BENZONATATE (TESSALON) 200 MG CAPSULE    Take 200 mg by mouth 3 (three) times daily as needed.    CETIRIZINE (ZYRTEC) 10 MG TABLET    Take 10 mg by mouth once daily.     CLONIDINE (CATAPRES) 0.1 MG TABLET        CYCLOBENZAPRINE (FLEXERIL) 10 MG TABLET    Take 10 mg by mouth 3 (three) times daily as needed.     ERGOCALCIFEROL (ERGOCALCIFEROL) 50,000 UNIT CAP    Take 50,000 Units by mouth every 7 days.     FLUCONAZOLE (DIFLUCAN) 150 MG TAB    TK 1 T PO QD FOR 1 DAY. MAY REPEAT IN 72 H IF SYMPTOMS PERSIST    FLUTICASONE (FLONASE) 50 MCG/ACTUATION NASAL SPRAY    2 sprays by Each Nare route once daily.    FUROSEMIDE (LASIX) 20 MG TABLET    Take 20 mg by mouth once daily.    GABAPENTIN (NEURONTIN) 300 MG CAPSULE    Take 300 mg by mouth daily as needed.     HYDROCHLOROTHIAZIDE (HYDRODIURIL) 25 MG TABLET    Take 25 mg by mouth once daily.    HYDROCODONE-ACETAMINOPHEN (NORCO)  5-325 MG PER TABLET    Take 1 tablet by mouth every 6 (six) hours as needed for Pain.    IBUPROFEN (ADVIL,MOTRIN) 800 MG TABLET    Take 800 mg by mouth every 6 (six) hours as needed.    LORATADINE (CLARITIN) 10 MG TABLET    Take 10 mg by mouth once daily.    MELOXICAM (MOBIC) 15 MG TABLET    Take 15 mg by mouth once daily.    MONTELUKAST (SINGULAIR) 10 MG TABLET    Take 10 mg by mouth daily as needed.     NICOTINE (NICODERM CQ) 7 MG/24 HR    Place 1 patch onto the skin once daily.    NICOTINE, POLACRILEX, (NICORETTE) 2 MG GUM    Take 2 mg by mouth.    OXYCODONE-ACETAMINOPHEN (PERCOCET) 5-325 MG PER TABLET    Take 1 tablet by mouth every 8 (eight) hours.    POTASSIUM CHLORIDE SA (K-DUR,KLOR-CON) 20 MEQ TABLET    Take 20 mEq by mouth once daily.    TRIAMTERENE-HYDROCHLOROTHIAZIDE 37.5-25 MG (DYAZIDE) 37.5-25 MG PER CAPSULE    Take 1 capsule by mouth once daily.     Review of patient's allergies indicates:  No Known Allergies  Review of Systems   Constitution: Negative for malaise/fatigue.   HENT: Negative for hearing loss.    Eyes: Negative for double vision and visual disturbance.   Cardiovascular: Negative for chest pain.   Respiratory: Negative for shortness of breath.    Endocrine: Negative for cold intolerance.   Hematologic/Lymphatic: Does not bruise/bleed easily.   Skin: Negative for poor wound healing and suspicious lesions.   Musculoskeletal: Positive for back pain and neck pain. Negative for gout, joint pain and joint swelling.   Gastrointestinal: Negative for nausea and vomiting.   Genitourinary: Negative for dysuria.   Neurological: Positive for numbness, paresthesias and sensory change.   Psychiatric/Behavioral: Positive for substance abuse. Negative for depression and memory loss. The patient is not nervous/anxious.    Allergic/Immunologic: Negative for persistent infections.       Objective:   Body mass index is 26.94 kg/m².  Vitals:    08/21/20 1031   BP: 116/79   Pulse: 60                 General    Constitutional: She is oriented to person, place, and time. She appears well-developed and well-nourished. No distress.   HENT:   Head: Normocephalic.   Mouth/Throat: Oropharynx is clear and moist.   Eyes: EOM are normal.   Neck: Normal range of motion.   Cardiovascular: Normal rate.    Pulmonary/Chest: Effort normal.   Abdominal: Soft.   Neurological: She is alert and oriented to person, place, and time. No cranial nerve deficit.   Psychiatric: She has a normal mood and affect.             Right Hand/Wrist Exam     Inspection   Scars: Wrist - absent   Effusion: Wrist - absent     Tests   Cubital Tunnel Compression Test: positive      Other     Neuorologic Exam    Median Distribution: normal  Ulnar Distribution: abnormal  Radial Distribution: normal      Right Elbow Exam     Inspection   Scars: absent  Effusion: absent    Other   Sensation: normal    Comments:  The patient has a positive Tinel over the ulnar nerve in the right cubital tunnel.  There is no intrinsic atrophy noted            Relevant imaging results reviewed and interpreted by me, discussed with the patient and / or family today radiographs right elbow were normal 2 years ago.  Assessment:     Encounter Diagnosis   Name Primary?    Cubital tunnel syndrome on right Yes    right cervical radiculopathy    Plan:       The patient wishes to have a right cubital tunnel release and possible anterior transposition ulnar nerve.  Risk complications and alternatives were discussed including the risk of infection, anesthetic risk, injury to nerves and vessels, loss of motion, and possible need for additional surgeries were discussed.  She seems to understand agree that surgery.  All questions were answered.              Disclaimer: This note was prepared using a voice recognition system and is likely to have sound alike errors within the text.

## 2020-08-24 DIAGNOSIS — G56.21 CUBITAL TUNNEL SYNDROME ON RIGHT: Primary | ICD-10-CM

## 2020-09-15 ENCOUNTER — HOSPITAL ENCOUNTER (OUTPATIENT)
Dept: PREADMISSION TESTING | Facility: HOSPITAL | Age: 65
Discharge: HOME | End: 2020-09-15
Attending: ORTHOPAEDIC SURGERY
Payer: MEDICARE

## 2020-09-15 VITALS
DIASTOLIC BLOOD PRESSURE: 91 MMHG | OXYGEN SATURATION: 97 % | RESPIRATION RATE: 17 BRPM | TEMPERATURE: 99 F | HEART RATE: 70 BPM | SYSTOLIC BLOOD PRESSURE: 152 MMHG

## 2020-09-15 DIAGNOSIS — Z01.818 PREOP TESTING: Primary | ICD-10-CM

## 2020-09-15 DIAGNOSIS — G56.21 CUBITAL TUNNEL SYNDROME ON RIGHT: ICD-10-CM

## 2020-09-15 LAB
ALBUMIN SERPL BCP-MCNC: 3.8 G/DL (ref 3.5–5.2)
ALP SERPL-CCNC: 71 U/L (ref 55–135)
ALT SERPL W/O P-5'-P-CCNC: 24 U/L (ref 10–44)
ANION GAP SERPL CALC-SCNC: 12 MMOL/L (ref 8–16)
AST SERPL-CCNC: 25 U/L (ref 10–40)
BASOPHILS # BLD AUTO: 0.02 K/UL (ref 0–0.2)
BASOPHILS NFR BLD: 0.2 % (ref 0–1.9)
BILIRUB SERPL-MCNC: 0.3 MG/DL (ref 0.1–1)
BUN SERPL-MCNC: 16 MG/DL (ref 8–23)
CALCIUM SERPL-MCNC: 9.8 MG/DL (ref 8.7–10.5)
CHLORIDE SERPL-SCNC: 103 MMOL/L (ref 95–110)
CO2 SERPL-SCNC: 26 MMOL/L (ref 23–29)
CREAT SERPL-MCNC: 1 MG/DL (ref 0.5–1.4)
DIFFERENTIAL METHOD: ABNORMAL
EOSINOPHIL # BLD AUTO: 0.1 K/UL (ref 0–0.5)
EOSINOPHIL NFR BLD: 0.8 % (ref 0–8)
ERYTHROCYTE [DISTWIDTH] IN BLOOD BY AUTOMATED COUNT: 12.9 % (ref 11.5–14.5)
EST. GFR  (AFRICAN AMERICAN): >60 ML/MIN/1.73 M^2
EST. GFR  (NON AFRICAN AMERICAN): 60 ML/MIN/1.73 M^2
GLUCOSE SERPL-MCNC: 98 MG/DL (ref 70–110)
HCT VFR BLD AUTO: 43.2 % (ref 37–48.5)
HGB BLD-MCNC: 14.8 G/DL (ref 12–16)
IMM GRANULOCYTES # BLD AUTO: 0.02 K/UL (ref 0–0.04)
IMM GRANULOCYTES NFR BLD AUTO: 0.2 % (ref 0–0.5)
LYMPHOCYTES # BLD AUTO: 3.3 K/UL (ref 1–4.8)
LYMPHOCYTES NFR BLD: 36 % (ref 18–48)
MCH RBC QN AUTO: 31.3 PG (ref 27–31)
MCHC RBC AUTO-ENTMCNC: 34.3 G/DL (ref 32–36)
MCV RBC AUTO: 91 FL (ref 82–98)
MONOCYTES # BLD AUTO: 0.5 K/UL (ref 0.3–1)
MONOCYTES NFR BLD: 5.4 % (ref 4–15)
NEUTROPHILS # BLD AUTO: 5.2 K/UL (ref 1.8–7.7)
NEUTROPHILS NFR BLD: 57.6 % (ref 38–73)
NRBC BLD-RTO: 0 /100 WBC
PLATELET # BLD AUTO: 285 K/UL (ref 150–350)
PMV BLD AUTO: 10 FL (ref 9.2–12.9)
POTASSIUM SERPL-SCNC: 3.3 MMOL/L (ref 3.5–5.1)
PROT SERPL-MCNC: 7.6 G/DL (ref 6–8.4)
RBC # BLD AUTO: 4.73 M/UL (ref 4–5.4)
SODIUM SERPL-SCNC: 141 MMOL/L (ref 136–145)
WBC # BLD AUTO: 9.02 K/UL (ref 3.9–12.7)

## 2020-09-15 PROCEDURE — 80053 COMPREHEN METABOLIC PANEL: CPT

## 2020-09-15 PROCEDURE — 85025 COMPLETE CBC W/AUTO DIFF WBC: CPT

## 2020-09-15 NOTE — H&P (VIEW-ONLY)
Preoperative History and Physical                                                             Hospital Medicine      Chief Complaint: Preoperative evaluation     History of Present Illness:      Alea Terrazas is a 64 y.o. female  who presents to the office today for a preoperative consultation at the request of Dr. Meyer who plans on performing   Right Cubital tunnel release on 10/1/20     Functional Status:      The patient is able to climb a flight of stairs. The patient is able at least one block but walking is limited due to back pain.  The patient does eprform all heavy cleaning needed at her house. The patient's functional status is  affected by the surgical problem. The patient's functional status is not affected by shortness of breath, chest pain, dyspnea on exertion and fatigue.    MET score greater than 4    Past Medical History:      Past Medical History:   Diagnosis Date    Anxiety     Arthritis     Carpal tunnel syndrome     Chronic neck and back pain     Hypertension         Past Surgical History:      Past Surgical History:   Procedure Laterality Date    CARPAL TUNNEL RELEASE Left     CARPAL TUNNEL RELEASE Right 9/5/2019    Procedure: RELEASE, CARPAL TUNNEL;  Surgeon: Billy Meyer MD;  Location: [facility] OR;  Service: Orthopedics;  Laterality: Right;  Confirmed anesthesia type with CRNA.    CERVICAL FUSION  2016, 2017    ROTATOR CUFF REPAIR Right 2015    TRIGGER FINGER RELEASE Right 9/5/2019    Procedure: RELEASE, TRIGGER FINGER;  Surgeon: Billy Meyer MD;  Location: [facility] OR;  Service: Orthopedics;  Laterality: Right;  right thumb   Confirmed anesthesia type with CRNA.        Social History:      Social History     Socioeconomic History    Marital status:      Spouse name: Not on file    Number of children: Not on file    Years of education: Not on file    Highest education level: Not on file   Occupational History     Not on file   Social Needs    Financial resource strain: Not on file    Food insecurity     Worry: Not on file     Inability: Not on file    Transportation needs     Medical: Not on file     Non-medical: Not on file   Tobacco Use    Smoking status: Current Some Day Smoker     Packs/day: 0.50     Years: 41.00     Pack years: 20.50     Types: Cigarettes    Smokeless tobacco: Never Used   Substance and Sexual Activity    Alcohol use: Yes     Frequency: Monthly or less     Drinks per session: 1 or 2     Comment: occasionally    Drug use: No    Sexual activity: Not on file   Lifestyle    Physical activity     Days per week: Not on file     Minutes per session: Not on file    Stress: Not on file   Relationships    Social connections     Talks on phone: Not on file     Gets together: Not on file     Attends Scientology service: Not on file     Active member of club or organization: Not on file     Attends meetings of clubs or organizations: Not on file     Relationship status: Not on file   Other Topics Concern    Not on file   Social History Narrative    Not on file        Family History:      Family History   Problem Relation Age of Onset    Cancer Father     Diabetes Father     Heart failure Father     COPD Mother 41    Kidney failure Mother     No Known Problems Brother     No Known Problems Brother        Allergies:      Review of patient's allergies indicates:  No Known Allergies    Medications:      Current Outpatient Medications   Medication Sig    albuterol (PROVENTIL/VENTOLIN HFA) 90 mcg/actuation inhaler INHALE 1 TO 2 PUFFS BY MOUTH INTO THE LUNGS EVERY 4-6 HOURS AS NEEDED    AQUANAZ 10- mg Tab Take 1 tablet by mouth every 6 (six) hours as needed. Hold 3 days prior to surgery    cetirizine (ZYRTEC) 10 MG tablet Take 10 mg by mouth once daily.     cyclobenzaprine (FLEXERIL) 10 MG tablet Take 10 mg by mouth 3 (three) times daily as needed.     ergocalciferol (ERGOCALCIFEROL) 50,000  unit Cap Take 50,000 Units by mouth every 7 days.     fluticasone (FLONASE) 50 mcg/actuation nasal spray 2 sprays by Each Nare route once daily.    furosemide (LASIX) 20 MG tablet Take 20 mg by mouth once daily.    gabapentin (NEURONTIN) 300 MG capsule Take 300 mg by mouth nightly as needed.     hydroCHLOROthiazide (HYDRODIURIL) 25 MG tablet Take 25 mg by mouth once daily.    meloxicam (MOBIC) 15 MG tablet Take 15 mg by mouth once daily. Hold 4 days prior to surgery    oxyCODONE-acetaminophen (PERCOCET) 5-325 mg per tablet Take 1 tablet by mouth 2 (two) times daily.     potassium chloride SA (K-DUR,KLOR-CON) 20 MEQ tablet Take 20 mEq by mouth once daily.    nicotine (NICODERM CQ) 7 mg/24 hr Place 1 patch onto the skin once daily. (Patient not taking: Reported on 8/21/2020)     No current facility-administered medications for this encounter.        Vitals:      Vitals:    09/15/20 0933   BP: (!) 152/91   Pulse: 70   Resp: 17   Temp: 98.6 °F (37 °C)       Review of Systems:        Constitutional: Negative for fever, chills, weight loss, malaise/fatigue and diaphoresis.   HENT: Negative for hearing loss, ear pain, nosebleeds, congestion, sore throat, neck pain, tinnitus and ear discharge.    Eyes: Negative for blurred vision, double vision, photophobia, pain, discharge and redness.   Respiratory: Negative for cough, hemoptysis, sputum production, shortness of breath, wheezing and stridor.    Cardiovascular: Negative for chest pain, palpitations, orthopnea, claudication, leg swelling and PND.   Gastrointestinal: Negative for heartburn, nausea, vomiting, abdominal pain, diarrhea, constipation, blood in stool and melena.   Genitourinary: Negative for dysuria, urgency, frequency, hematuria and flank pain.   Musculoskeletal: +right arm pain +chronic neck and back pain  Negative for myalgias,  joint pain and falls.   Skin: Negative for itching and rash.   Neurological: Negative for dizziness, tingling, tremors,  sensory change, speech change, focal weakness, seizures, loss of consciousness, weakness and headaches.   Endo/Heme/Allergies: Negative for environmental allergies and polydipsia. Does not bruise/bleed easily.   Psychiatric/Behavioral: Negative for depression, suicidal ideas, hallucinations, memory loss and substance abuse. The patient  does not have insomnia.    All 14 systems reviewed and negative except as noted above.    Physical Exam:      Constitutional: Appears well-developed, well-nourished and in no acute distress.  Patient is oriented to person, place, and time.   Head: Normocephalic and atraumatic. Mucous membranes moist.  Neck: Neck supple no mass.   Cardiovascular: Normal rate and regular rhythm.  S1 S2 appreciated by ascultation.  Pulmonary/Chest: Effort normal and clear to auscultation bilaterally. No respiratory distress.   Abdomen: Soft. Non-tender and non-distended. Bowel sounds are normal.   Neurological: Patient is alert and oriented to person, place and time. Moves all extremities.  Skin: Warm and dry. No lesions.  Extremities: No clubbing, cyanosis or edema.    Laboratory data:      Reviewed and noted in plan where applicable. Please see chart for full laboratory data.    No results for input(s): CPK, CPKMB, TROPONINI, MB in the last 24 hours. No results for input(s): POCTGLUCOSE in the last 24 hours.     No results found for: INR, PROTIME    Lab Results   Component Value Date    WBC 9.02 09/15/2020    HGB 14.8 09/15/2020    HCT 43.2 09/15/2020    MCV 91 09/15/2020     09/15/2020       Recent Labs   Lab 09/15/20  0941   GLU 98      K 3.3*      CO2 26   BUN 16   CREATININE 1.0   CALCIUM 9.8       Predictors of intubation difficulty:       Morbid obesity? no   Anatomically abnormal facies? no   Prominent incisors? no   Receding mandible? no   Short, thick neck? no   Neck range of motion: normal   Dentition: Missing teeth (lower) and dentures to upper   Mallampati class II    Cardiographics:      EC19  Normal sinus rhythm  Possible Left atrial enlargement  Low voltage QRS  Incomplete right bundle branch block  Septal infarct ,age undetermined  Abnormal ECG  No previous ECGs available  Confirmed by PALAK TEJEDA, GRECIA ([id number]) on 2019 11:07:49 PM     Imaging:      Chest x-ray: 19  The lungs are clear and free of infiltrate.  No pleural effusion or pneumothorax. The heart is not enlarged. There is tortuosity of the descending thoracic aorta.  Postoperative changes associated with the lower cervical spine.    Assessment and Plan:      Cubital tunnel syndrome   The patient is scheduled for a Right Cubital tunnel release on 10/1/20 by Dr. Meyer     Known risk factors for perioperative complications:     HTN  Tobacco abuse  Chronic pain     Difficulty with intubation is not anticipated.    Cardiac Risk Estimation: low intraoperative cardiac risk    1.) Preoperative workup as follows: electrolytes.  2.) Change in medication regimen before surgery: discontinue NSAIDs (Mobic) 7 days before surgery and discontinue antihypertensives (Lasix and Dyazide) to avoid hypotension from anesthesia.  3.) Prophylaxis for cardiac events with perioperative beta-blockers: not indicated.  4.) Invasive hemodynamic monitoring perioperatively: not indicated.  5.) Deep vein thrombosis prophylaxis postoperatively: regimen to be chosen by surgical team.  6.) Surveillance for postoperative MI with ECG immediately postoperatively and on postoperati ve days 1 and 2 AND troponin levels 24 hours postoperatively and on day 4 or hospital discharge (whichever comes first): not indicated.  7.) Current medications which may produce withdrawal symptoms if withheld perioperatively: percocet  8.) Other measures: Preoperative alcohol abstention x 30 days.  Preoperative tobacco abstention x 60 days.    Hypertension  /91 in PAT  Cont Lasix and HCTZ  Hold Lasix am of surgery per pt request   Counseled on 2gm NA diet    Instructed pt to f/u with PCP for BP recheck     Chronic back pain  Cont Gabapentin nightly, Percocet BID, and Flexeril prn     Tobacco abuse   Smoking cessation education provided     S/p cervical fusion  Good ROM on exam but some complaints of radicular symptoms to shoulders   Supportive care

## 2020-09-15 NOTE — H&P
Preoperative History and Physical                                                             Hospital Medicine      Chief Complaint: Preoperative evaluation     History of Present Illness:      Alea Terrazas is a 64 y.o. female  who presents to the office today for a preoperative consultation at the request of Dr. Meyer who plans on performing   Right Cubital tunnel release on 10/1/20     Functional Status:      The patient is able to climb a flight of stairs. The patient is able at least one block but walking is limited due to back pain.  The patient does eprform all heavy cleaning needed at her house. The patient's functional status is  affected by the surgical problem. The patient's functional status is not affected by shortness of breath, chest pain, dyspnea on exertion and fatigue.    MET score greater than 4    Past Medical History:      Past Medical History:   Diagnosis Date    Anxiety     Arthritis     Carpal tunnel syndrome     Chronic neck and back pain     Hypertension         Past Surgical History:      Past Surgical History:   Procedure Laterality Date    CARPAL TUNNEL RELEASE Left     CARPAL TUNNEL RELEASE Right 9/5/2019    Procedure: RELEASE, CARPAL TUNNEL;  Surgeon: Billy Meyer MD;  Location: [facility] OR;  Service: Orthopedics;  Laterality: Right;  Confirmed anesthesia type with CRNA.    CERVICAL FUSION  2016, 2017    ROTATOR CUFF REPAIR Right 2015    TRIGGER FINGER RELEASE Right 9/5/2019    Procedure: RELEASE, TRIGGER FINGER;  Surgeon: Billy Meyer MD;  Location: [facility] OR;  Service: Orthopedics;  Laterality: Right;  right thumb   Confirmed anesthesia type with CRNA.        Social History:      Social History     Socioeconomic History    Marital status:      Spouse name: Not on file    Number of children: Not on file    Years of education: Not on file    Highest education level: Not on file   Occupational History     Not on file   Social Needs    Financial resource strain: Not on file    Food insecurity     Worry: Not on file     Inability: Not on file    Transportation needs     Medical: Not on file     Non-medical: Not on file   Tobacco Use    Smoking status: Current Some Day Smoker     Packs/day: 0.50     Years: 41.00     Pack years: 20.50     Types: Cigarettes    Smokeless tobacco: Never Used   Substance and Sexual Activity    Alcohol use: Yes     Frequency: Monthly or less     Drinks per session: 1 or 2     Comment: occasionally    Drug use: No    Sexual activity: Not on file   Lifestyle    Physical activity     Days per week: Not on file     Minutes per session: Not on file    Stress: Not on file   Relationships    Social connections     Talks on phone: Not on file     Gets together: Not on file     Attends Caodaism service: Not on file     Active member of club or organization: Not on file     Attends meetings of clubs or organizations: Not on file     Relationship status: Not on file   Other Topics Concern    Not on file   Social History Narrative    Not on file        Family History:      Family History   Problem Relation Age of Onset    Cancer Father     Diabetes Father     Heart failure Father     COPD Mother 41    Kidney failure Mother     No Known Problems Brother     No Known Problems Brother        Allergies:      Review of patient's allergies indicates:  No Known Allergies    Medications:      Current Outpatient Medications   Medication Sig    albuterol (PROVENTIL/VENTOLIN HFA) 90 mcg/actuation inhaler INHALE 1 TO 2 PUFFS BY MOUTH INTO THE LUNGS EVERY 4-6 HOURS AS NEEDED    AQUANAZ 10- mg Tab Take 1 tablet by mouth every 6 (six) hours as needed. Hold 3 days prior to surgery    cetirizine (ZYRTEC) 10 MG tablet Take 10 mg by mouth once daily.     cyclobenzaprine (FLEXERIL) 10 MG tablet Take 10 mg by mouth 3 (three) times daily as needed.     ergocalciferol (ERGOCALCIFEROL) 50,000  unit Cap Take 50,000 Units by mouth every 7 days.     fluticasone (FLONASE) 50 mcg/actuation nasal spray 2 sprays by Each Nare route once daily.    furosemide (LASIX) 20 MG tablet Take 20 mg by mouth once daily.    gabapentin (NEURONTIN) 300 MG capsule Take 300 mg by mouth nightly as needed.     hydroCHLOROthiazide (HYDRODIURIL) 25 MG tablet Take 25 mg by mouth once daily.    meloxicam (MOBIC) 15 MG tablet Take 15 mg by mouth once daily. Hold 4 days prior to surgery    oxyCODONE-acetaminophen (PERCOCET) 5-325 mg per tablet Take 1 tablet by mouth 2 (two) times daily.     potassium chloride SA (K-DUR,KLOR-CON) 20 MEQ tablet Take 20 mEq by mouth once daily.    nicotine (NICODERM CQ) 7 mg/24 hr Place 1 patch onto the skin once daily. (Patient not taking: Reported on 8/21/2020)     No current facility-administered medications for this encounter.        Vitals:      Vitals:    09/15/20 0933   BP: (!) 152/91   Pulse: 70   Resp: 17   Temp: 98.6 °F (37 °C)       Review of Systems:        Constitutional: Negative for fever, chills, weight loss, malaise/fatigue and diaphoresis.   HENT: Negative for hearing loss, ear pain, nosebleeds, congestion, sore throat, neck pain, tinnitus and ear discharge.    Eyes: Negative for blurred vision, double vision, photophobia, pain, discharge and redness.   Respiratory: Negative for cough, hemoptysis, sputum production, shortness of breath, wheezing and stridor.    Cardiovascular: Negative for chest pain, palpitations, orthopnea, claudication, leg swelling and PND.   Gastrointestinal: Negative for heartburn, nausea, vomiting, abdominal pain, diarrhea, constipation, blood in stool and melena.   Genitourinary: Negative for dysuria, urgency, frequency, hematuria and flank pain.   Musculoskeletal: +right arm pain +chronic neck and back pain  Negative for myalgias,  joint pain and falls.   Skin: Negative for itching and rash.   Neurological: Negative for dizziness, tingling, tremors,  sensory change, speech change, focal weakness, seizures, loss of consciousness, weakness and headaches.   Endo/Heme/Allergies: Negative for environmental allergies and polydipsia. Does not bruise/bleed easily.   Psychiatric/Behavioral: Negative for depression, suicidal ideas, hallucinations, memory loss and substance abuse. The patient  does not have insomnia.    All 14 systems reviewed and negative except as noted above.    Physical Exam:      Constitutional: Appears well-developed, well-nourished and in no acute distress.  Patient is oriented to person, place, and time.   Head: Normocephalic and atraumatic. Mucous membranes moist.  Neck: Neck supple no mass.   Cardiovascular: Normal rate and regular rhythm.  S1 S2 appreciated by ascultation.  Pulmonary/Chest: Effort normal and clear to auscultation bilaterally. No respiratory distress.   Abdomen: Soft. Non-tender and non-distended. Bowel sounds are normal.   Neurological: Patient is alert and oriented to person, place and time. Moves all extremities.  Skin: Warm and dry. No lesions.  Extremities: No clubbing, cyanosis or edema.    Laboratory data:      Reviewed and noted in plan where applicable. Please see chart for full laboratory data.    No results for input(s): CPK, CPKMB, TROPONINI, MB in the last 24 hours. No results for input(s): POCTGLUCOSE in the last 24 hours.     No results found for: INR, PROTIME    Lab Results   Component Value Date    WBC 9.02 09/15/2020    HGB 14.8 09/15/2020    HCT 43.2 09/15/2020    MCV 91 09/15/2020     09/15/2020       Recent Labs   Lab 09/15/20  0941   GLU 98      K 3.3*      CO2 26   BUN 16   CREATININE 1.0   CALCIUM 9.8       Predictors of intubation difficulty:       Morbid obesity? no   Anatomically abnormal facies? no   Prominent incisors? no   Receding mandible? no   Short, thick neck? no   Neck range of motion: normal   Dentition: Missing teeth (lower) and dentures to upper   Mallampati class II    Cardiographics:      EC19  Normal sinus rhythm  Possible Left atrial enlargement  Low voltage QRS  Incomplete right bundle branch block  Septal infarct ,age undetermined  Abnormal ECG  No previous ECGs available  Confirmed by PALAK TEJEDA, GRECIA ([id number]) on 2019 11:07:49 PM     Imaging:      Chest x-ray: 19  The lungs are clear and free of infiltrate.  No pleural effusion or pneumothorax. The heart is not enlarged. There is tortuosity of the descending thoracic aorta.  Postoperative changes associated with the lower cervical spine.    Assessment and Plan:      Cubital tunnel syndrome   The patient is scheduled for a Right Cubital tunnel release on 10/1/20 by Dr. Meyer     Known risk factors for perioperative complications:     HTN  Tobacco abuse  Chronic pain     Difficulty with intubation is not anticipated.    Cardiac Risk Estimation: low intraoperative cardiac risk    1.) Preoperative workup as follows: electrolytes.  2.) Change in medication regimen before surgery: discontinue NSAIDs (Mobic) 7 days before surgery and discontinue antihypertensives (Lasix and Dyazide) to avoid hypotension from anesthesia.  3.) Prophylaxis for cardiac events with perioperative beta-blockers: not indicated.  4.) Invasive hemodynamic monitoring perioperatively: not indicated.  5.) Deep vein thrombosis prophylaxis postoperatively: regimen to be chosen by surgical team.  6.) Surveillance for postoperative MI with ECG immediately postoperatively and on postoperati ve days 1 and 2 AND troponin levels 24 hours postoperatively and on day 4 or hospital discharge (whichever comes first): not indicated.  7.) Current medications which may produce withdrawal symptoms if withheld perioperatively: percocet  8.) Other measures: Preoperative alcohol abstention x 30 days.  Preoperative tobacco abstention x 60 days.    Hypertension  /91 in PAT  Cont Lasix and HCTZ  Hold Lasix am of surgery per pt request   Counseled on 2gm NA diet    Instructed pt to f/u with PCP for BP recheck     Chronic back pain  Cont Gabapentin nightly, Percocet BID, and Flexeril prn     Tobacco abuse   Smoking cessation education provided     S/p cervical fusion  Good ROM on exam but some complaints of radicular symptoms to shoulders   Supportive care

## 2020-09-28 ENCOUNTER — LAB VISIT (OUTPATIENT)
Dept: OTOLARYNGOLOGY | Facility: CLINIC | Age: 65
End: 2020-09-28
Payer: MEDICARE

## 2020-09-28 DIAGNOSIS — Z01.818 PREOP TESTING: ICD-10-CM

## 2020-09-28 PROCEDURE — U0003 INFECTIOUS AGENT DETECTION BY NUCLEIC ACID (DNA OR RNA); SEVERE ACUTE RESPIRATORY SYNDROME CORONAVIRUS 2 (SARS-COV-2) (CORONAVIRUS DISEASE [COVID-19]), AMPLIFIED PROBE TECHNIQUE, MAKING USE OF HIGH THROUGHPUT TECHNOLOGIES AS DESCRIBED BY CMS-2020-01-R: HCPCS

## 2020-09-29 ENCOUNTER — ANESTHESIA EVENT (OUTPATIENT)
Dept: SURGERY | Facility: HOSPITAL | Age: 65
End: 2020-09-29
Payer: MEDICARE

## 2020-09-29 LAB — SARS-COV-2 RNA RESP QL NAA+PROBE: NOT DETECTED

## 2020-10-01 ENCOUNTER — ANESTHESIA (OUTPATIENT)
Dept: SURGERY | Facility: HOSPITAL | Age: 65
End: 2020-10-01
Payer: MEDICARE

## 2020-10-01 ENCOUNTER — HOSPITAL ENCOUNTER (OUTPATIENT)
Facility: HOSPITAL | Age: 65
Discharge: HOME | End: 2020-10-01
Attending: ORTHOPAEDIC SURGERY | Admitting: ORTHOPAEDIC SURGERY
Payer: MEDICARE

## 2020-10-01 VITALS
HEART RATE: 79 BPM | BODY MASS INDEX: 27.89 KG/M2 | TEMPERATURE: 98 F | OXYGEN SATURATION: 99 % | SYSTOLIC BLOOD PRESSURE: 132 MMHG | RESPIRATION RATE: 20 BRPM | WEIGHT: 177.69 LBS | HEIGHT: 67 IN | DIASTOLIC BLOOD PRESSURE: 79 MMHG

## 2020-10-01 DIAGNOSIS — G56.21 CUBITAL TUNNEL SYNDROME ON RIGHT: Primary | ICD-10-CM

## 2020-10-01 PROCEDURE — 27200651 HC AIRWAY, LMA: Performed by: ANESTHESIOLOGY

## 2020-10-01 PROCEDURE — 64718 PR REVISE ULNAR NERVE AT ELBOW: ICD-10-PCS | Mod: RT,,, | Performed by: ORTHOPAEDIC SURGERY

## 2020-10-01 PROCEDURE — 37000008 HC ANESTHESIA 1ST 15 MINUTES: Performed by: ORTHOPAEDIC SURGERY

## 2020-10-01 PROCEDURE — D9220A PRA ANESTHESIA: Mod: CRNA,,, | Performed by: NURSE ANESTHETIST, CERTIFIED REGISTERED

## 2020-10-01 PROCEDURE — 25000003 PHARM REV CODE 250: Performed by: NURSE ANESTHETIST, CERTIFIED REGISTERED

## 2020-10-01 PROCEDURE — 25000003 PHARM REV CODE 250: Performed by: ORTHOPAEDIC SURGERY

## 2020-10-01 PROCEDURE — 63600175 PHARM REV CODE 636 W HCPCS: Performed by: PHYSICIAN ASSISTANT

## 2020-10-01 PROCEDURE — 63600175 PHARM REV CODE 636 W HCPCS: Performed by: ANESTHESIOLOGY

## 2020-10-01 PROCEDURE — 36000707: Performed by: ORTHOPAEDIC SURGERY

## 2020-10-01 PROCEDURE — 64718 REVISE ULNAR NERVE AT ELBOW: CPT | Mod: RT,,, | Performed by: ORTHOPAEDIC SURGERY

## 2020-10-01 PROCEDURE — D9220A PRA ANESTHESIA: ICD-10-PCS | Mod: CRNA,,, | Performed by: NURSE ANESTHETIST, CERTIFIED REGISTERED

## 2020-10-01 PROCEDURE — 71000033 HC RECOVERY, INTIAL HOUR: Performed by: ORTHOPAEDIC SURGERY

## 2020-10-01 PROCEDURE — D9220A PRA ANESTHESIA: Mod: ANES,,, | Performed by: ANESTHESIOLOGY

## 2020-10-01 PROCEDURE — 37000009 HC ANESTHESIA EA ADD 15 MINS: Performed by: ORTHOPAEDIC SURGERY

## 2020-10-01 PROCEDURE — D9220A PRA ANESTHESIA: ICD-10-PCS | Mod: ANES,,, | Performed by: ANESTHESIOLOGY

## 2020-10-01 PROCEDURE — 63600175 PHARM REV CODE 636 W HCPCS: Performed by: NURSE ANESTHETIST, CERTIFIED REGISTERED

## 2020-10-01 PROCEDURE — 71000015 HC POSTOP RECOV 1ST HR: Performed by: ORTHOPAEDIC SURGERY

## 2020-10-01 PROCEDURE — 36000706: Performed by: ORTHOPAEDIC SURGERY

## 2020-10-01 RX ORDER — BUPIVACAINE HYDROCHLORIDE 2.5 MG/ML
INJECTION, SOLUTION EPIDURAL; INFILTRATION; INTRACAUDAL
Status: DISCONTINUED
Start: 2020-10-01 | End: 2020-10-01 | Stop reason: HOSPADM

## 2020-10-01 RX ORDER — MIDAZOLAM HYDROCHLORIDE 1 MG/ML
INJECTION, SOLUTION INTRAMUSCULAR; INTRAVENOUS
Status: DISCONTINUED | OUTPATIENT
Start: 2020-10-01 | End: 2020-10-01

## 2020-10-01 RX ORDER — BUPIVACAINE HYDROCHLORIDE 2.5 MG/ML
INJECTION, SOLUTION EPIDURAL; INFILTRATION; INTRACAUDAL
Status: DISCONTINUED | OUTPATIENT
Start: 2020-10-01 | End: 2020-10-01 | Stop reason: HOSPADM

## 2020-10-01 RX ORDER — SODIUM CHLORIDE, SODIUM LACTATE, POTASSIUM CHLORIDE, CALCIUM CHLORIDE 600; 310; 30; 20 MG/100ML; MG/100ML; MG/100ML; MG/100ML
INJECTION, SOLUTION INTRAVENOUS
Status: ACTIVE | OUTPATIENT
Start: 2020-10-01

## 2020-10-01 RX ORDER — ALBUTEROL SULFATE 0.83 MG/ML
2.5 SOLUTION RESPIRATORY (INHALATION) EVERY 4 HOURS PRN
Status: DISCONTINUED | OUTPATIENT
Start: 2020-10-01 | End: 2020-10-01 | Stop reason: HOSPADM

## 2020-10-01 RX ORDER — CEFAZOLIN SODIUM 2 G/50ML
2 SOLUTION INTRAVENOUS
Status: COMPLETED | OUTPATIENT
Start: 2020-10-01 | End: 2020-10-01

## 2020-10-01 RX ORDER — EPHEDRINE SULFATE 50 MG/ML
INJECTION, SOLUTION INTRAVENOUS
Status: DISCONTINUED | OUTPATIENT
Start: 2020-10-01 | End: 2020-10-01

## 2020-10-01 RX ORDER — ONDANSETRON 2 MG/ML
4 INJECTION INTRAMUSCULAR; INTRAVENOUS ONCE AS NEEDED
Status: DISCONTINUED | OUTPATIENT
Start: 2020-10-01 | End: 2020-10-01 | Stop reason: HOSPADM

## 2020-10-01 RX ORDER — HYDROCODONE BITARTRATE AND ACETAMINOPHEN 5; 325 MG/1; MG/1
1 TABLET ORAL
Status: DISCONTINUED | OUTPATIENT
Start: 2020-10-01 | End: 2020-10-01 | Stop reason: HOSPADM

## 2020-10-01 RX ORDER — ONDANSETRON 2 MG/ML
INJECTION INTRAMUSCULAR; INTRAVENOUS
Status: DISCONTINUED | OUTPATIENT
Start: 2020-10-01 | End: 2020-10-01

## 2020-10-01 RX ORDER — FENTANYL CITRATE 50 UG/ML
25 INJECTION, SOLUTION INTRAMUSCULAR; INTRAVENOUS EVERY 5 MIN PRN
Status: DISCONTINUED | OUTPATIENT
Start: 2020-10-01 | End: 2020-10-01 | Stop reason: HOSPADM

## 2020-10-01 RX ORDER — LIDOCAINE HCL/PF 100 MG/5ML
SYRINGE (ML) INTRAVENOUS
Status: DISCONTINUED | OUTPATIENT
Start: 2020-10-01 | End: 2020-10-01

## 2020-10-01 RX ORDER — CHLORHEXIDINE GLUCONATE ORAL RINSE 1.2 MG/ML
10 SOLUTION DENTAL 2 TIMES DAILY
Status: DISCONTINUED | OUTPATIENT
Start: 2020-10-01 | End: 2020-10-01 | Stop reason: HOSPADM

## 2020-10-01 RX ORDER — MEPERIDINE HYDROCHLORIDE 25 MG/ML
12.5 INJECTION INTRAMUSCULAR; INTRAVENOUS; SUBCUTANEOUS ONCE
Status: DISCONTINUED | OUTPATIENT
Start: 2020-10-01 | End: 2020-10-01 | Stop reason: HOSPADM

## 2020-10-01 RX ORDER — OXYCODONE HYDROCHLORIDE 5 MG/1
5 TABLET ORAL EVERY 4 HOURS PRN
Status: DISCONTINUED | OUTPATIENT
Start: 2020-10-01 | End: 2020-10-01 | Stop reason: HOSPADM

## 2020-10-01 RX ORDER — PROPOFOL 10 MG/ML
INJECTION, EMULSION INTRAVENOUS
Status: DISCONTINUED | OUTPATIENT
Start: 2020-10-01 | End: 2020-10-01

## 2020-10-01 RX ORDER — OXYCODONE HYDROCHLORIDE 5 MG/1
5 TABLET ORAL EVERY 4 HOURS PRN
Qty: 28 TABLET | Refills: 0 | Status: SHIPPED | OUTPATIENT
Start: 2020-10-01 | End: 2020-10-29

## 2020-10-01 RX ORDER — DIPHENHYDRAMINE HYDROCHLORIDE 50 MG/ML
25 INJECTION INTRAMUSCULAR; INTRAVENOUS EVERY 6 HOURS PRN
Status: DISCONTINUED | OUTPATIENT
Start: 2020-10-01 | End: 2020-10-01 | Stop reason: HOSPADM

## 2020-10-01 RX ORDER — FENTANYL CITRATE 50 UG/ML
INJECTION, SOLUTION INTRAMUSCULAR; INTRAVENOUS
Status: DISCONTINUED | OUTPATIENT
Start: 2020-10-01 | End: 2020-10-01

## 2020-10-01 RX ORDER — SODIUM CHLORIDE, SODIUM LACTATE, POTASSIUM CHLORIDE, CALCIUM CHLORIDE 600; 310; 30; 20 MG/100ML; MG/100ML; MG/100ML; MG/100ML
INJECTION, SOLUTION INTRAVENOUS CONTINUOUS
Status: ACTIVE | OUTPATIENT
Start: 2020-10-01

## 2020-10-01 RX ADMIN — EPHEDRINE SULFATE 10 MG: 50 INJECTION INTRAVENOUS at 08:10

## 2020-10-01 RX ADMIN — MIDAZOLAM 2 MG: 1 INJECTION INTRAMUSCULAR; INTRAVENOUS at 08:10

## 2020-10-01 RX ADMIN — PROPOFOL 100 MG: 10 INJECTION, EMULSION INTRAVENOUS at 08:10

## 2020-10-01 RX ADMIN — SODIUM CHLORIDE, SODIUM LACTATE, POTASSIUM CHLORIDE, AND CALCIUM CHLORIDE: 600; 310; 30; 20 INJECTION, SOLUTION INTRAVENOUS at 08:10

## 2020-10-01 RX ADMIN — PROPOFOL 50 MG: 10 INJECTION, EMULSION INTRAVENOUS at 09:10

## 2020-10-01 RX ADMIN — SODIUM CHLORIDE, SODIUM LACTATE, POTASSIUM CHLORIDE, AND CALCIUM CHLORIDE: 600; 310; 30; 20 INJECTION, SOLUTION INTRAVENOUS at 07:10

## 2020-10-01 RX ADMIN — OXYCODONE HYDROCHLORIDE 5 MG: 5 TABLET ORAL at 09:10

## 2020-10-01 RX ADMIN — Medication 50 MG: at 08:10

## 2020-10-01 RX ADMIN — FENTANYL CITRATE 100 MCG: 50 INJECTION, SOLUTION INTRAMUSCULAR; INTRAVENOUS at 08:10

## 2020-10-01 RX ADMIN — CEFAZOLIN SODIUM 2 G: 2 SOLUTION INTRAVENOUS at 08:10

## 2020-10-01 RX ADMIN — GLYCOPYRROLATE 0.2 MG: 0.2 INJECTION, SOLUTION INTRAMUSCULAR; INTRAVITREAL at 08:10

## 2020-10-01 RX ADMIN — ONDANSETRON 4 MG: 2 INJECTION, SOLUTION INTRAMUSCULAR; INTRAVENOUS at 08:10

## 2020-10-01 NOTE — DISCHARGE SUMMARY
[facility]  Discharge Note  Short Stay    OUTCOME: Patient tolerated treatment/procedure well without complication and is now ready for discharge.    DISPOSITION: Home or Self Care    FINAL DIAGNOSIS:  Right cubital tunnel syndrome    FOLLOWUP: In orthopedic clinic

## 2020-10-01 NOTE — ANESTHESIA POSTPROCEDURE EVALUATION
Anesthesia Post Evaluation    Patient: Alea Terrazas    Procedure(s) Performed: Procedure(s) (LRB):  RELEASE, CUBITAL TUNNEL (Right)    Final Anesthesia Type: general    Patient location during evaluation: PACU  Patient participation: Yes- Able to Participate  Level of consciousness: awake and alert and oriented  Post-procedure vital signs: reviewed and stable  Pain management: adequate  Airway patency: patent    PONV status at discharge: No PONV  Anesthetic complications: no      Cardiovascular status: blood pressure returned to baseline, stable and hemodynamically stable  Respiratory status: unassisted  Hydration status: euvolemic  Follow-up not needed.          Vitals Value Taken Time   /79 10/01/20 1030   Temp 36.7 °C (98.1 °F) 10/01/20 1015   Pulse 79 10/01/20 1030   Resp 20 10/01/20 1030   SpO2 99 % 10/01/20 1030         Event Time   Out of Recovery 10:00:00         Pain/Justina Score: Pain Rating Prior to Med Admin: 7 (10/1/2020  9:42 AM)  Justina Score: 10 (10/1/2020 10:30 AM)

## 2020-10-01 NOTE — OP NOTE
[facility]  General Surgery  Operative Note    SUMMARY     Date of Procedure: 10/1/2020     Procedure: Procedure(s) (LRB):  RELEASE, CUBITAL TUNNEL (Right)       Surgeon(s) and Role:     * Billy Meyer MD - Primary    Assisting Surgeon: None    Pre-Operative Diagnosis: Cubital tunnel syndrome on right [G56.21]    Post-Operative Diagnosis: Post-Op Diagnosis Codes:     * Cubital tunnel syndrome on right [G56.21]    Anesthesia: General    Description:  The patient was taken the operating room where general anesthesia was administered.  Satisfactory anesthesia had been achieved the right arm was prepped and draped in the usual sterile fashion.  After exsanguination of the extremity a proximal tourniquet was inflated to 250 mm of mercury after patient received 2 g of Ancef intravenously preoperatively.  At this time a longitudinal incision was made just anterior to the medial epicondyle.  Dissection was carried posterior to the medial epicondyle.  The ulnar nerve was identified in the cubital tunnel and Ware is ligaments were released sharply.  The nerve was freed from the medial intermuscular septum to the 1st branch to the flexor carpi ulnaris.  A vessel loop was used around the nerve for mobilization.  The elbow was flexed and extended and the nerve was stable.  It was elected not to do an anterior transposition.  Satisfactory cubital tunnel release had been achieved subcutaneous tissue was closed using 2 Vicryl suture.  Skin was closed using horizontal mattress 4 nylon suture.  10 cc of 0.25% plain Marcaine were used about the wound.  Xeroform gauze 4x4s and 2 ABD pads over wrapped with 3 in gauze dressing.  The patient tolerated the procedure well was transferred to the recovery room in satisfactory condition.    Significant Surgical Tasks Conducted by the Assistant(s), if Applicable:  No assistant    Complications:  None     Estimated Blood Loss (EBL):  5 mL           Implants:  None    Specimens: None           Condition: Good    Disposition: PACU - hemodynamically stable.    Attestation: I was present and scrubbed for the entire procedure.

## 2020-10-01 NOTE — PLAN OF CARE
In pre-op, patient stated her dentures, clothes and shoes are in locker; her purse & wallet are with her spouse & her cell phone is at home.      Anesthesia paper consent is in chart; anesthesia ipad used in pre-op is not working.

## 2020-10-01 NOTE — INTERVAL H&P NOTE
The patient has been examined and the H&P has been reviewed:    I concur with the findings and no changes have occurred since H&P was written.    Surgery risks, benefits and alternative options discussed and understood by patient/family.          Active Hospital Problems    Diagnosis  POA    Cubital tunnel syndrome on right [G56.21]  Yes     mild demyelinating on right        Resolved Hospital Problems   No resolved problems to display.

## 2020-10-05 ENCOUNTER — OFFICE VISIT (OUTPATIENT)
Dept: ORTHOPEDICS | Facility: CLINIC | Age: 65
End: 2020-10-05
Payer: MEDICARE

## 2020-10-05 VITALS
DIASTOLIC BLOOD PRESSURE: 84 MMHG | HEIGHT: 67 IN | SYSTOLIC BLOOD PRESSURE: 128 MMHG | BODY MASS INDEX: 27.78 KG/M2 | HEART RATE: 75 BPM | WEIGHT: 177 LBS

## 2020-10-05 DIAGNOSIS — G56.21 CUBITAL TUNNEL SYNDROME ON RIGHT: Primary | ICD-10-CM

## 2020-10-05 DIAGNOSIS — Z09 POSTOP CHECK: ICD-10-CM

## 2020-10-05 PROCEDURE — 99024 PR POST-OP FOLLOW-UP VISIT: ICD-10-PCS | Mod: S$GLB,,, | Performed by: PHYSICIAN ASSISTANT

## 2020-10-05 PROCEDURE — 99999 PR PBB SHADOW E&M-EST. PATIENT-LVL IV: CPT | Mod: PBBFAC,,, | Performed by: PHYSICIAN ASSISTANT

## 2020-10-05 PROCEDURE — 99999 PR PBB SHADOW E&M-EST. PATIENT-LVL IV: ICD-10-PCS | Mod: PBBFAC,,, | Performed by: PHYSICIAN ASSISTANT

## 2020-10-05 PROCEDURE — 99024 POSTOP FOLLOW-UP VISIT: CPT | Mod: S$GLB,,, | Performed by: PHYSICIAN ASSISTANT

## 2020-10-05 RX ORDER — FLUCONAZOLE 200 MG/1
TABLET ORAL
COMMUNITY
Start: 2020-09-17 | End: 2021-12-14

## 2020-10-05 RX ORDER — CIPROFLOXACIN 500 MG/1
TABLET ORAL
COMMUNITY
Start: 2020-09-01 | End: 2021-12-14

## 2020-10-05 NOTE — PROGRESS NOTES
Patient ID: Alea Terrazas is a 64 y.o. female.    Chief Complaint: Post-op Evaluation of the Right Elbow      HPI: Alea Terrazas  is a 64 y.o. female who c/o Post-op Evaluation of the Right Elbow   for duration of will days.  She had surgery late last week by Dr. Meyer.  She comes today for 1st postoperative evaluation.  Pain level is 1/10.  Quality is intermittent aching pain.  Numbness and tingling is almost resolved from prior to surgery.  Alleviating factors include postop pain medication.  Aggravating factors include full flexion and full extension.    Procedure: Right CTR  DOS:  10/01/2020    Past Medical History:   Diagnosis Date    Anxiety     Arthritis     Carpal tunnel syndrome     Chronic neck and back pain     Hypertension      Past Surgical History:   Procedure Laterality Date    CARPAL TUNNEL RELEASE Left     CARPAL TUNNEL RELEASE Right 9/5/2019    Procedure: RELEASE, CARPAL TUNNEL;  Surgeon: Billy Meyer MD;  Location: [facility] OR;  Service: Orthopedics;  Laterality: Right;  Confirmed anesthesia type with CRNA.    CERVICAL FUSION  2016, 2017    ROTATOR CUFF REPAIR Right 2015    TRIGGER FINGER RELEASE Right 9/5/2019    Procedure: RELEASE, TRIGGER FINGER;  Surgeon: Billy Meyer MD;  Location: [facility] OR;  Service: Orthopedics;  Laterality: Right;  right thumb   Confirmed anesthesia type with CRNA.    ULNAR TUNNEL RELEASE Right 10/1/2020    Procedure: RELEASE, CUBITAL TUNNEL;  Surgeon: Billy Meyer MD;  Location: [facility] OR;  Service: Orthopedics;  Laterality: Right;  lower arm (near elbow)  Per MARK Kent CRNA, general anesthesia used.  Per surgeon, anterior transposition ulnar nerve was NOT peformed.       Family History   Problem Relation Age of Onset    Cancer Father     Diabetes Father     Heart failure Father     COPD Mother 41    Kidney failure Mother     No Known Problems Brother     No Known Problems Brother      Social History     Socioeconomic History     Marital status:      Spouse name: Not on file    Number of children: Not on file    Years of education: Not on file    Highest education level: Not on file   Occupational History    Not on file   Social Needs    Financial resource strain: Not on file    Food insecurity     Worry: Not on file     Inability: Not on file    Transportation needs     Medical: Not on file     Non-medical: Not on file   Tobacco Use    Smoking status: Current Some Day Smoker     Packs/day: 0.50     Years: 41.00     Pack years: 20.50     Types: Cigarettes    Smokeless tobacco: Never Used   Substance and Sexual Activity    Alcohol use: Yes     Frequency: Monthly or less     Drinks per session: 1 or 2     Comment: occasionally    Drug use: No    Sexual activity: Not on file   Lifestyle    Physical activity     Days per week: Not on file     Minutes per session: Not on file    Stress: Not on file   Relationships    Social connections     Talks on phone: Not on file     Gets together: Not on file     Attends [employer] service: Not on file     Active member of club or organization: Not on file     Attends meetings of clubs or organizations: Not on file     Relationship status: Not on file   Other Topics Concern    Not on file   Social History Narrative    Not on file     Medication List with Changes/Refills   Current Medications    ALBUTEROL (PROVENTIL/VENTOLIN HFA) 90 MCG/ACTUATION INHALER    INHALE 1 TO 2 PUFFS BY MOUTH INTO THE LUNGS EVERY 4-6 HOURS AS NEEDED    AQUANAZ 10- MG TAB    Take 1 tablet by mouth every 6 (six) hours as needed. Hold 3 days prior to surgery    CETIRIZINE (ZYRTEC) 10 MG TABLET    Take 10 mg by mouth once daily.     CIPROFLOXACIN HCL (CIPRO) 500 MG TABLET        CYCLOBENZAPRINE (FLEXERIL) 10 MG TABLET    Take 10 mg by mouth 3 (three) times daily as needed.     ERGOCALCIFEROL (ERGOCALCIFEROL) 50,000 UNIT CAP    Take 50,000 Units by mouth every 7 days.     FLUCONAZOLE (DIFLUCAN) 200 MG TAB         FLUTICASONE (FLONASE) 50 MCG/ACTUATION NASAL SPRAY    2 sprays by Each Nare route once daily.    FUROSEMIDE (LASIX) 20 MG TABLET    Take 20 mg by mouth once daily.    GABAPENTIN (NEURONTIN) 300 MG CAPSULE    Take 300 mg by mouth nightly as needed.     HYDROCHLOROTHIAZIDE (HYDRODIURIL) 25 MG TABLET    Take 25 mg by mouth once daily.    MELOXICAM (MOBIC) 15 MG TABLET    Take 15 mg by mouth once daily. Hold 4 days prior to surgery    NICOTINE (NICODERM CQ) 7 MG/24 HR    Place 1 patch onto the skin once daily.    OXYCODONE (ROXICODONE) 5 MG IMMEDIATE RELEASE TABLET    Take 1 tablet (5 mg total) by mouth every 4 (four) hours as needed for Pain.    OXYCODONE-ACETAMINOPHEN (PERCOCET) 5-325 MG PER TABLET    Take 1 tablet by mouth 2 (two) times daily.     POTASSIUM CHLORIDE SA (K-DUR,KLOR-CON) 20 MEQ TABLET    Take 20 mEq by mouth once daily.     Review of patient's allergies indicates:  No Known Allergies    Objective:     Right Upper Extremity   2+ rad pulse  Comp soft  Sensation intact  Inc C/D/I  No SOI  Cap refill < 2 sec  Motor intact to hand and wrist  ROM elbow     Assessment:       Encounter Diagnoses   Name Primary?    Cubital tunnel syndrome on right Yes    Postop check           Plan:       Alea was seen today for post-op evaluation.    Diagnoses and all orders for this visit:    Cubital tunnel syndrome on right    Postop check        Alea Terrazas is an established pt here for postop follow-up as above.  She still has pain medication to take if needed. The incision was cleaned with hydrogen peroxide and normal saline. A sterile Band-Aid was applied.  Patient may clean the incision daily as above.  She was instructed to keep the incision clean and dry until follow-up with Dr. Meyer.  Patient should notify the office of any signs or symptoms of infection including fevers, erythema, purulent drainage, increasing pain.  Patient will follow up with Dr. Meyer as scheduled.  She verbalizes  understanding and agrees.    Follow up in about 10 days (around 10/15/2020).    The patient understands, chooses and consents to this plan and accepts all   the risks which include but are not limited to the risks mentioned above.     Disclaimer: This note was prepared using a voice recognition system and is likely to have sound alike errors within the text.

## 2020-10-14 ENCOUNTER — OFFICE VISIT (OUTPATIENT)
Dept: ORTHOPEDICS | Facility: CLINIC | Age: 65
End: 2020-10-14
Payer: MEDICARE

## 2020-10-14 VITALS
BODY MASS INDEX: 27.78 KG/M2 | HEIGHT: 67 IN | WEIGHT: 177 LBS | SYSTOLIC BLOOD PRESSURE: 107 MMHG | HEART RATE: 68 BPM | DIASTOLIC BLOOD PRESSURE: 68 MMHG

## 2020-10-14 DIAGNOSIS — G56.21 CUBITAL TUNNEL SYNDROME ON RIGHT: Primary | ICD-10-CM

## 2020-10-14 DIAGNOSIS — M65.311 TRIGGER THUMB OF RIGHT HAND: ICD-10-CM

## 2020-10-14 PROCEDURE — 20550 NJX 1 TENDON SHEATH/LIGAMENT: CPT | Mod: 58,F5,S$GLB, | Performed by: ORTHOPAEDIC SURGERY

## 2020-10-14 PROCEDURE — 20550 TENDON SHEATH: R RING MCP: ICD-10-PCS | Mod: 58,F5,S$GLB, | Performed by: ORTHOPAEDIC SURGERY

## 2020-10-14 PROCEDURE — 99024 PR POST-OP FOLLOW-UP VISIT: ICD-10-PCS | Mod: S$GLB,,, | Performed by: ORTHOPAEDIC SURGERY

## 2020-10-14 PROCEDURE — 99999 PR PBB SHADOW E&M-EST. PATIENT-LVL IV: ICD-10-PCS | Mod: PBBFAC,,, | Performed by: ORTHOPAEDIC SURGERY

## 2020-10-14 PROCEDURE — 99999 PR PBB SHADOW E&M-EST. PATIENT-LVL IV: CPT | Mod: PBBFAC,,, | Performed by: ORTHOPAEDIC SURGERY

## 2020-10-14 PROCEDURE — 99024 POSTOP FOLLOW-UP VISIT: CPT | Mod: S$GLB,,, | Performed by: ORTHOPAEDIC SURGERY

## 2020-10-14 RX ORDER — ASPIRIN 325 MG
50000 TABLET, DELAYED RELEASE (ENTERIC COATED) ORAL
COMMUNITY
Start: 2020-10-09

## 2020-10-14 RX ORDER — TRIAMCINOLONE ACETONIDE 40 MG/ML
40 INJECTION, SUSPENSION INTRA-ARTICULAR; INTRAMUSCULAR
Status: DISCONTINUED | OUTPATIENT
Start: 2020-10-14 | End: 2020-10-14 | Stop reason: HOSPADM

## 2020-10-14 RX ORDER — MELOXICAM 7.5 MG/1
TABLET ORAL
COMMUNITY
Start: 2020-10-09 | End: 2021-12-14

## 2020-10-14 RX ADMIN — TRIAMCINOLONE ACETONIDE 40 MG: 40 INJECTION, SUSPENSION INTRA-ARTICULAR; INTRAMUSCULAR at 01:10

## 2020-10-14 NOTE — PROCEDURES
Tendon Sheath: R ring MCP    Date/Time: 10/14/2020 1:00 PM  Performed by: Billy Meyer MD  Authorized by: Billy Meyer MD     Consent Done?:  Yes (Verbal)  Indications:  Pain  Timeout: prior to procedure the correct patient, procedure, and site was verified    Prep: patient was prepped and draped in usual sterile fashion      Local anesthesia used?: Yes    Local anesthetic:  Lidocaine 2% without epinephrine  Anesthetic total (ml):  0.5    Location:  Ring finger  Site:  R ring MCP  Ultrasonic guidance for needle placement?: No    Needle size:  25 G  Approach:  Volar  Medications:  40 mg triamcinolone acetonide 40 mg/mL

## 2020-10-14 NOTE — PROGRESS NOTES
Subjective:     Patient ID: Alea Terrazas is a 64 y.o. female.    Chief Complaint: Post-op Evaluation of the Right Elbow    The patient is a 64-year-old female status post right cubital tunnel release date of surgery is 10/01/2020.  She is doing well with that.  She has also has developed a right ring trigger finger that she wished to have injected.      Past Medical History:   Diagnosis Date    Anxiety     Arthritis     Carpal tunnel syndrome     Chronic neck and back pain     Hypertension      Past Surgical History:   Procedure Laterality Date    CARPAL TUNNEL RELEASE Left     CARPAL TUNNEL RELEASE Right 9/5/2019    Procedure: RELEASE, CARPAL TUNNEL;  Surgeon: Billy Meyer MD;  Location: [facility];  Service: Orthopedics;  Laterality: Right;  Confirmed anesthesia type with CRNA.    CERVICAL FUSION  2016, 2017    ROTATOR CUFF REPAIR Right 2015    TRIGGER FINGER RELEASE Right 9/5/2019    Procedure: RELEASE, TRIGGER FINGER;  Surgeon: Billy Meyer MD;  Location: [facility] OR;  Service: Orthopedics;  Laterality: Right;  right thumb   Confirmed anesthesia type with CRNA.    ULNAR TUNNEL RELEASE Right 10/1/2020    Procedure: RELEASE, CUBITAL TUNNEL;  Surgeon: Billy Meyer MD;  Location: [facility] OR;  Service: Orthopedics;  Laterality: Right;  lower arm (near elbow)  Per MARK Kent CRNA, general anesthesia used.  Per surgeon, anterior transposition ulnar nerve was NOT peformed.       Family History   Problem Relation Age of Onset    Cancer Father     Diabetes Father     Heart failure Father     COPD Mother 41    Kidney failure Mother     No Known Problems Brother     No Known Problems Brother      Social History     Socioeconomic History    Marital status:      Spouse name: Not on file    Number of children: Not on file    Years of education: Not on file    Highest education level: Not on file   Occupational History    Not on file   Social Needs    Financial resource  strain: Not on file    Food insecurity     Worry: Not on file     Inability: Not on file    Transportation needs     Medical: Not on file     Non-medical: Not on file   Tobacco Use    Smoking status: Current Some Day Smoker     Packs/day: 0.50     Years: 41.00     Pack years: 20.50     Types: Cigarettes    Smokeless tobacco: Never Used   Substance and Sexual Activity    Alcohol use: Yes     Frequency: Monthly or less     Drinks per session: 1 or 2     Comment: occasionally    Drug use: No    Sexual activity: Not on file   Lifestyle    Physical activity     Days per week: Not on file     Minutes per session: Not on file    Stress: Not on file   Relationships    Social connections     Talks on phone: Not on file     Gets together: Not on file     Attends Mor[employer] service: Not on file     Active member of club or organization: Not on file     Attends meetings of clubs or organizations: Not on file     Relationship status: Not on file   Other Topics Concern    Not on file   Social History Narrative    Not on file     Medication List with Changes/Refills   Current Medications    ALBUTEROL (PROVENTIL/VENTOLIN HFA) 90 MCG/ACTUATION INHALER    INHALE 1 TO 2 PUFFS BY MOUTH INTO THE LUNGS EVERY 4-6 HOURS AS NEEDED    AQUANAZ 10- MG TAB    Take 1 tablet by mouth every 6 (six) hours as needed. Hold 3 days prior to surgery    CETIRIZINE (ZYRTEC) 10 MG TABLET    Take 10 mg by mouth once daily.     CHOLECALCIFEROL, VITAMIN D3, 1,250 MCG (50,000 UNIT) CAPSULE    Take 50,000 Units by mouth every 7 days.    CIPROFLOXACIN HCL (CIPRO) 500 MG TABLET        CYCLOBENZAPRINE (FLEXERIL) 10 MG TABLET    Take 10 mg by mouth 3 (three) times daily as needed.     ERGOCALCIFEROL (ERGOCALCIFEROL) 50,000 UNIT CAP    Take 50,000 Units by mouth every 7 days.     FLUCONAZOLE (DIFLUCAN) 200 MG TAB        FLUTICASONE (FLONASE) 50 MCG/ACTUATION NASAL SPRAY    2 sprays by Each Nare route once daily.    FUROSEMIDE (LASIX) 20 MG TABLET     Take 20 mg by mouth once daily.    GABAPENTIN (NEURONTIN) 300 MG CAPSULE    Take 300 mg by mouth nightly as needed.     HYDROCHLOROTHIAZIDE (HYDRODIURIL) 25 MG TABLET    Take 25 mg by mouth once daily.    MELOXICAM (MOBIC) 15 MG TABLET    Take 15 mg by mouth once daily. Hold 4 days prior to surgery    MELOXICAM (MOBIC) 7.5 MG TABLET    TAKE 1 TABLET BY MOUTH ONCE DAILY FOR THE NEXT COUPLE OF WEEKS    NICOTINE (NICODERM CQ) 7 MG/24 HR    Place 1 patch onto the skin once daily.    OXYCODONE (ROXICODONE) 5 MG IMMEDIATE RELEASE TABLET    Take 1 tablet (5 mg total) by mouth every 4 (four) hours as needed for Pain.    OXYCODONE-ACETAMINOPHEN (PERCOCET) 5-325 MG PER TABLET    Take 1 tablet by mouth 2 (two) times daily.     POTASSIUM CHLORIDE SA (K-DUR,KLOR-CON) 20 MEQ TABLET    Take 20 mEq by mouth once daily.     Review of patient's allergies indicates:  No Known Allergies  Review of Systems   Constitution: Negative for malaise/fatigue.   HENT: Negative for hearing loss.    Eyes: Negative for double vision and visual disturbance.   Cardiovascular: Negative for chest pain.   Respiratory: Negative for shortness of breath.    Endocrine: Negative for cold intolerance.   Hematologic/Lymphatic: Does not bruise/bleed easily.   Skin: Negative for poor wound healing and suspicious lesions.   Musculoskeletal: Positive for back pain and neck pain. Negative for gout, joint pain and joint swelling.   Gastrointestinal: Negative for nausea and vomiting.   Genitourinary: Negative for dysuria.   Neurological: Positive for numbness, paresthesias and sensory change.   Psychiatric/Behavioral: Negative for depression, memory loss and substance abuse. The patient is not nervous/anxious.    Allergic/Immunologic: Negative for persistent infections.       Objective:   Body mass index is 27.73 kg/m².  Vitals:    10/14/20 1307   BP: 107/68   Pulse: 68                General    Constitutional: She is oriented to person, place, and time. She appears  well-developed and well-nourished. No distress.   HENT:   Head: Normocephalic.   Eyes: EOM are normal.   Neck: Normal range of motion.   Pulmonary/Chest: Effort normal.   Neurological: She is oriented to person, place, and time.   Psychiatric: She has a normal mood and affect.             Right Hand/Wrist Exam     Inspection   Scars: Wrist - present Hand -  absent  Effusion: Wrist - absent Hand -  absent    Pain   Hand - The patient exhibits pain of the ring MCP.  Wrist - The patient exhibits pain of the flexor/pronator group.    Other     Neuorologic Exam    Median Distribution: normal  Ulnar Distribution: normal  Radial Distribution: normal    Comments:  Patient has a well-healed incision right medial elbow.  The suture line is intact.  There is no sign of infection.  There are no motor or sensory deficits.  She has active triggering right ring finger with a palpable nodule that moves with tendon excursion.          Vascular Exam       Capillary Refill  Right Hand: normal capillary refill           radiographs were not obtained today  Assessment:     Encounter Diagnoses   Name Primary?    Cubital tunnel syndrome on right Yes    Trigger thumb of right hand     right ring trigger finger    Plan:       The patient injected right ring trigger finger with 0.5 cc of 2% plain lidocaine and 0.5 cc of Kenalog.  Sutures removed right medial elbow.  She seems to be doing well.  She will return in 6 weeks to follow her progress              Disclaimer: This note was prepared using a voice recognition system and is likely to have sound alike errors within the text.

## 2021-08-14 NOTE — TRANSFER OF CARE
"Anesthesia Transfer of Care Note    Patient: Alea Terrazas    Procedure(s) Performed: Procedure(s) (LRB):  RELEASE, CUBITAL TUNNEL (Right)    Patient location: PACU    Anesthesia Type: general    Transport from OR: Transported from OR on room air with adequate spontaneous ventilation    Post pain: adequate analgesia    Post assessment: no apparent anesthetic complications and tolerated procedure well    Post vital signs: stable    Level of consciousness: awake, alert and oriented    Nausea/Vomiting: no nausea/vomiting    Complications: none    Transfer of care protocol was followed      Last vitals:   Visit Vitals  /75 (BP Location: Right arm, Patient Position: Lying)   Pulse 75   Temp 36.4 °C (97.5 °F) (Temporal)   Resp 20   Ht 5' 7" (1.702 m)   Wt 80.6 kg (177 lb 11.1 oz)   SpO2 100%   Breastfeeding No   BMI 27.83 kg/m²     "
David Alberto(Attending)

## 2021-10-04 ENCOUNTER — TELEPHONE (OUTPATIENT)
Dept: ORTHOPEDICS | Facility: CLINIC | Age: 66
End: 2021-10-04

## 2021-12-14 ENCOUNTER — OFFICE VISIT (OUTPATIENT)
Dept: ORTHOPEDICS | Facility: CLINIC | Age: 66
End: 2021-12-14
Payer: MEDICARE

## 2021-12-14 VITALS — HEART RATE: 58 BPM | DIASTOLIC BLOOD PRESSURE: 74 MMHG | SYSTOLIC BLOOD PRESSURE: 112 MMHG

## 2021-12-14 DIAGNOSIS — M65.311 TRIGGER THUMB OF RIGHT HAND: Primary | ICD-10-CM

## 2021-12-14 PROCEDURE — 99999 PR PBB SHADOW E&M-EST. PATIENT-LVL III: CPT | Mod: PBBFAC,,, | Performed by: ORTHOPAEDIC SURGERY

## 2021-12-14 PROCEDURE — 20550 TENDON SHEATH: ICD-10-PCS | Mod: RT,S$GLB,, | Performed by: ORTHOPAEDIC SURGERY

## 2021-12-14 PROCEDURE — 99999 PR PBB SHADOW E&M-EST. PATIENT-LVL III: ICD-10-PCS | Mod: PBBFAC,,, | Performed by: ORTHOPAEDIC SURGERY

## 2021-12-14 PROCEDURE — 20550 NJX 1 TENDON SHEATH/LIGAMENT: CPT | Mod: RT,S$GLB,, | Performed by: ORTHOPAEDIC SURGERY

## 2021-12-14 PROCEDURE — 99213 PR OFFICE/OUTPT VISIT, EST, LEVL III, 20-29 MIN: ICD-10-PCS | Mod: 25,S$GLB,, | Performed by: ORTHOPAEDIC SURGERY

## 2021-12-14 PROCEDURE — 99213 OFFICE O/P EST LOW 20 MIN: CPT | Mod: 25,S$GLB,, | Performed by: ORTHOPAEDIC SURGERY

## 2021-12-14 RX ORDER — TRIAMTERENE AND HYDROCHLOROTHIAZIDE 37.5; 25 MG/1; MG/1
CAPSULE ORAL
COMMUNITY

## 2021-12-14 RX ORDER — TRIAMCINOLONE ACETONIDE 40 MG/ML
40 INJECTION, SUSPENSION INTRA-ARTICULAR; INTRAMUSCULAR
Status: DISCONTINUED | OUTPATIENT
Start: 2021-12-14 | End: 2021-12-14 | Stop reason: HOSPADM

## 2021-12-14 RX ADMIN — TRIAMCINOLONE ACETONIDE 40 MG: 40 INJECTION, SUSPENSION INTRA-ARTICULAR; INTRAMUSCULAR at 01:12

## 2022-11-16 DIAGNOSIS — M79.641 BILATERAL HAND PAIN: Primary | ICD-10-CM

## 2022-11-16 DIAGNOSIS — M79.642 BILATERAL HAND PAIN: Primary | ICD-10-CM

## 2022-11-28 NOTE — PROGRESS NOTES
SUBJECTIVE:      Chief Complaint: Pain of the Left Hand and Pain of the Right Hand    Referring Provider: No ref. provider found     History of Present Illness:  Patient is a 66 y.o. right hand dominant female who presents today with complaints of bilateral hand pain, right worse than left.  She is a history of right long, ring, and small trigger fingers, last CSI was 12/14/2021 with good results.  Today, she admits that the right small, ring, and long fingers are locking again.  She also says that the left thumb and index fingers are locking as well.  She states that she massages the fingers for relief.  Reports that the weather will affect the hands as well.  She is taking Percocet and Mobic for the pain.  She admits to numbness in both hands, especially in the morning.  The numbness comes and goes.  She states that she feels like her  is weak.  She had a right carpal tunnel release done in 2019 by Dr. Meyer. The patient denies any fevers, chills, N/V, D/C and presents for evaluation.    Interval hx 12/14/21: The patient is a 66-year-old female with a right long ring and small trigger finger for the last several months.  She was last injected 10/14/2020 with good results until recently and requests reinjection.    Past Medical History:   Diagnosis Date    Anxiety     Arthritis     Carpal tunnel syndrome     Chronic neck and back pain     Hypertension      Past Surgical History:   Procedure Laterality Date    CARPAL TUNNEL RELEASE Left     CARPAL TUNNEL RELEASE Right 9/5/2019    Procedure: RELEASE, CARPAL TUNNEL;  Surgeon: Billy Meyer MD;  Location: [facility] OR;  Service: Orthopedics;  Laterality: Right;  Confirmed anesthesia type with CRNA.    CERVICAL FUSION  2016, 2017    ROTATOR CUFF REPAIR Right 2015    TRIGGER FINGER RELEASE Right 9/5/2019    Procedure: RELEASE, TRIGGER FINGER;  Surgeon: Billy Meyer MD;  Location: [facility] OR;  Service: Orthopedics;  Laterality: Right;  right thumb    Confirmed anesthesia type with CRNA.    ULNAR TUNNEL RELEASE Right 10/1/2020    Procedure: RELEASE, CUBITAL TUNNEL;  Surgeon: Billy Meyer MD;  Location: [facility];  Service: Orthopedics;  Laterality: Right;  lower arm (near elbow)  Per MARK Kent CRNA, general anesthesia used.  Per surgeon, anterior transposition ulnar nerve was NOT peformed.       Review of patient's allergies indicates:  No Known Allergies  Social History     Social History Narrative    Not on file     Family History   Problem Relation Age of Onset    Cancer Father     Diabetes Father     Heart failure Father     COPD Mother 41    Kidney failure Mother     No Known Problems Brother     No Known Problems Brother          Current Outpatient Medications:     albuterol (PROVENTIL/VENTOLIN HFA) 90 mcg/actuation inhaler, INHALE 1 TO 2 PUFFS BY MOUTH INTO THE LUNGS EVERY 4-6 HOURS AS NEEDED, Disp: , Rfl:     AQUANAZ 10- mg Tab, Take 1 tablet by mouth every 6 (six) hours as needed. Hold 3 days prior to surgery, Disp: , Rfl: 0    celecoxib (CELEBREX) 200 MG capsule, Take 200 mg by mouth., Disp: , Rfl:     cetirizine (ZYRTEC) 10 MG tablet, Take 10 mg by mouth once daily. , Disp: , Rfl: 0    cholecalciferol, vitamin D3, 1,250 mcg (50,000 unit) capsule, Take 50,000 Units by mouth every 7 days., Disp: , Rfl:     cyclobenzaprine (FLEXERIL) 10 MG tablet, Take 10 mg by mouth 3 (three) times daily as needed. , Disp: , Rfl:     ergocalciferol (ERGOCALCIFEROL) 50,000 unit Cap, Take 50,000 Units by mouth every 7 days. , Disp: , Rfl:     fluticasone (FLONASE) 50 mcg/actuation nasal spray, 2 sprays by Each Nare route once daily., Disp: , Rfl: 0    gabapentin (NEURONTIN) 300 MG capsule, Take 300 mg by mouth nightly as needed. , Disp: , Rfl:     gabapentin enacarbil 600 mg TbSR, Take 600 mg by mouth., Disp: , Rfl:     hydroCHLOROthiazide (HYDRODIURIL) 25 MG tablet, Take 25 mg by mouth once daily., Disp: , Rfl:     lisinopriL-hydrochlorothiazide  "(PRINZIDE,ZESTORETIC) 20-12.5 mg per tablet, Take by mouth., Disp: , Rfl:     meloxicam (MOBIC) 15 MG tablet, Take 15 mg by mouth once daily. Hold 4 days prior to surgery, Disp: , Rfl: 0    oxyCODONE-acetaminophen (PERCOCET) 5-325 mg per tablet, Take 1 tablet by mouth 2 (two) times daily. , Disp: , Rfl: 0    triamterene-hydrochlorothiazide 37.5-25 mg (DYAZIDE) 37.5-25 mg per capsule, Dyazide Take capsule 1 time per day No date recorded capsule 1 time per day No route recorded No set duration recorded No set duration amount recorded suspended 37.5-25 mg, Disp: , Rfl:   No current facility-administered medications for this visit.    Facility-Administered Medications Ordered in Other Visits:     lactated ringers infusion, , Intravenous, On Call Procedure, LINDA Jama, New Bag at 10/01/20 0835    lactated ringers infusion, , Intravenous, Continuous, Airam Arteaga MD, Last Rate: 10 mL/hr at 10/01/20 0745, New Bag at 10/01/20 0745    nozaseptin (NOZIN) nasal , , Each Nostril, On Call Procedure, LINDA Jama, Given at 10/01/20 0745      Review of Systems:  Constitutional: Negative for chills and fever.   Respiratory: Negative for cough and shortness of breath.    Gastrointestinal: Negative for nausea and vomiting.   Skin: Negative for rash.   Neurological: Negative for dizziness and headaches.   Psychiatric/Behavioral: Negative for depression.   MSK as in HPI     OBJECTIVE:      Vital Signs (Most Recent):  Vitals:    11/29/22 0929   Weight: 80.3 kg (177 lb 0.5 oz)   Height: 5' 7" (1.702 m)     Body mass index is 27.73 kg/m².      Physical Exam:  Constitutional: The patient appears well-developed and well-nourished. No distress.   Skin: No lesions appreciated  Head: Normocephalic and atraumatic.   Nose: Nose normal.   Ears: No deformities seen  Eyes: Conjunctivae and EOM are normal.   Neck: No tracheal deviation present.   Cardiovascular: Normal rate and intact distal pulses.  "   Pulmonary/Chest: Effort normal. No respiratory distress.   Abdominal: There is no guarding.   Neurological: The patient is alert.   Psychiatric: The patient has a normal mood and affect.     General    Vitals reviewed.            Right Hand/Wrist Exam     Inspection   Scars: Wrist - absent Hand -  present (Well-healed carpal tunnel release scar)  Effusion: Wrist - absent Hand -  absent    Pain   Hand - The patient exhibits pain of the middle MCP, ring MCP and little MCP.    Tests   Tinel's sign (median nerve): positive      Other     Neuorologic Exam    Median Distribution: normal  Ulnar Distribution: normal  Radial Distribution: normal    Comments:  Active triggering noted of the middle, ring, small fingers with a palpable nodule at the level of the A1 pulley that moves with tendon excursion  No motor or sensory deficits  No signs of infection      Left Hand/Wrist Exam     Inspection   Scars: Wrist - absent Hand -  absent  Effusion: Wrist - absent Hand -  absent    Pain   Hand - The patient exhibits pain of the thumb MCP and index MCP.    Tests   Tinel's sign (median nerve): negative      Other     Sensory Exam  Median Distribution: normal  Ulnar Distribution: normal  Radial Distribution: normal    Comments:  Active triggering noted of the index and thumb with a palpable nodule at the level of the A1 pulley that moves with tendon excursion  Tender to palpation at thumb MCP  No motor or sensory deficits   No signs of infection      Right Elbow Exam     Tests   Tinel's sign (cubital tunnel): negative      Left Elbow Exam     Tests   Tinel's sign (cubital tunnel): negative        Vascular Exam       Capillary Refill  Right Hand: normal capillary refill  Left Hand: normal capillary refill        Diagnostic Results:    EXAMINATION:  XR HAND COMPLETE 3 VIEWS BILATERAL     CLINICAL HISTORY:  . Pain in right hand     TECHNIQUE:  PA, lateral, and oblique views of both hands were performed.     COMPARISON:  06/04/2019      FINDINGS:  No acute fracture or dislocation identified.  Mild degenerative changes seen at either triscaphe joint.  Even more mild degenerative changes seen at either 1st CMC joint.  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  No erosive changes are identified.     Impression:     As above        Electronically signed by: Dandy Lara DO  Date:                                            11/29/2022  Time:                                           09:35       EMG - 8/20/20  IMPRESSION  1. ABNORMAL study  2. 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  There was a chronic radiculopathy of the C8 and T1 nerve roots    ASSESSMENT/PLAN:        ICD-10-CM ICD-9-CM   1. Trigger finger, left index finger  M65.322 727.03   2. Trigger finger of left thumb  M65.312 727.03   3. Trigger finger, right middle finger  M65.331 727.03   4. Trigger finger, right index finger  M65.321 727.03   5. Trigger finger, right little finger  M65.351 727.03   6. Numbness and tingling in both hands  R20.0 782.0    R20.2      Orders Placed This Encounter    Tendon Sheath    Tendon Sheath    Carpal Tunnel     Orders Placed This Encounter   Procedures    Tendon Sheath    Tendon Sheath    Carpal Tunnel       Plan:     -discussed diagnosis of trigger finger with patient, all questions answered  -left trigger thumb injection, right middle trigger finger injection, right carpal tunnel injection today.  The patient stated that these areas hurt her the worst and I told her that I did not want to inject too much steroid at 1 time, so we will start with these injections and she may return in about 1 month to get injections in the other trigger fingers if needed.  She voiced understanding.  -patient states that she has a carpal tunnel brace at home, instructed to wear at night  -continue conservative treatment:  Ice/heat as needed, bracing,  activity modifications, rest, NSAIDs as tolerated  -follow-up as needed    PROCEDURE:  I have explained the risks, benefits, and alternatives of the procedure in detail.  The patient voices understanding and all questions have been answered.  The patient agrees to proceed as planned. So after a sterile prep of the skin in the normal fashion the right carpal tunnel is injected from the volar approach using a 25 gauge needle with a combination of 1cc 2% plain lidocaine and 1cc of kenalog.  The patient is cautioned and immediate relief of pain is secondary to the local anesthetic and will be temporary.  After the anesthetic wears off there may be a increase in pain that may last for a few hours or a few days and they should use ice to help alleviate this flare up of pain. Patient tolerated the procedure well.     PROCEDURE NOTE:  I have explained the risks, benefits, and alternatives of the procedure in detail.  The patient voices understanding and all questions have been answered, consents to injection. Pause for timeout.  After a steril prep of the skin in the normal fashion, the level of the A-1 pulley of the right long finger and left thumb is injected using a 25 gauge needle from the volar approach with a combination of 0.5 cc 2% plain Lidocaine and 0.5 cc kenalog.  The patient is cautioned and immediate relief of pain is secondary to the local anesthetic and will be temporary.  After the anesthetic wears off there may be a increase in pain that may last for a few hours or a few days and they should use ice to help alleviate this flare up of pain.       Should the patient's symptoms worsen, persist, or fail to improve they should return for reevaluation and I would be happy to see them back anytime.        Monica Leonard PA-C   [facility]     Please be aware that this note has been generated with the assistance of Jae voice-to-text.  Please excuse any spelling or grammatical errors.

## 2022-11-29 ENCOUNTER — HOSPITAL ENCOUNTER (OUTPATIENT)
Dept: RADIOLOGY | Facility: HOSPITAL | Age: 67
Discharge: HOME | End: 2022-11-29
Payer: MEDICARE

## 2022-11-29 ENCOUNTER — OFFICE VISIT (OUTPATIENT)
Dept: ORTHOPEDICS | Facility: CLINIC | Age: 67
End: 2022-11-29
Payer: MEDICARE

## 2022-11-29 VITALS — HEIGHT: 67 IN | WEIGHT: 177 LBS | BODY MASS INDEX: 27.78 KG/M2

## 2022-11-29 DIAGNOSIS — R20.0 NUMBNESS AND TINGLING IN BOTH HANDS: ICD-10-CM

## 2022-11-29 DIAGNOSIS — M65.312 TRIGGER FINGER OF LEFT THUMB: ICD-10-CM

## 2022-11-29 DIAGNOSIS — M79.641 BILATERAL HAND PAIN: ICD-10-CM

## 2022-11-29 DIAGNOSIS — M79.642 BILATERAL HAND PAIN: ICD-10-CM

## 2022-11-29 DIAGNOSIS — M65.331 TRIGGER FINGER, RIGHT MIDDLE FINGER: ICD-10-CM

## 2022-11-29 DIAGNOSIS — M65.321 TRIGGER FINGER, RIGHT INDEX FINGER: ICD-10-CM

## 2022-11-29 DIAGNOSIS — M65.351 TRIGGER FINGER, RIGHT LITTLE FINGER: ICD-10-CM

## 2022-11-29 DIAGNOSIS — R20.2 NUMBNESS AND TINGLING IN BOTH HANDS: ICD-10-CM

## 2022-11-29 DIAGNOSIS — M65.322 TRIGGER FINGER, LEFT INDEX FINGER: Primary | ICD-10-CM

## 2022-11-29 PROCEDURE — 1159F PR MEDICATION LIST DOCUMENTED IN MEDICAL RECORD: ICD-10-PCS | Mod: CPTII,S$GLB,,

## 2022-11-29 PROCEDURE — 3008F BODY MASS INDEX DOCD: CPT | Mod: CPTII,S$GLB,,

## 2022-11-29 PROCEDURE — 99213 OFFICE O/P EST LOW 20 MIN: CPT | Mod: 25,S$GLB,,

## 2022-11-29 PROCEDURE — 73130 XR HAND COMPLETE 3 VIEWS BILATERAL: ICD-10-PCS | Mod: 26,50,, | Performed by: RADIOLOGY

## 2022-11-29 PROCEDURE — 4010F ACE/ARB THERAPY RXD/TAKEN: CPT | Mod: CPTII,S$GLB,,

## 2022-11-29 PROCEDURE — 99213 PR OFFICE/OUTPT VISIT, EST, LEVL III, 20-29 MIN: ICD-10-PCS | Mod: 25,S$GLB,,

## 2022-11-29 PROCEDURE — 1159F MED LIST DOCD IN RCRD: CPT | Mod: CPTII,S$GLB,,

## 2022-11-29 PROCEDURE — 20526 PR INJECT CARPAL TUNNEL: ICD-10-PCS | Mod: RT,S$GLB,,

## 2022-11-29 PROCEDURE — 20526 THER INJECTION CARP TUNNEL: CPT | Mod: RT,S$GLB,,

## 2022-11-29 PROCEDURE — 20550 NJX 1 TENDON SHEATH/LIGAMENT: CPT | Mod: 50,51,S$GLB,

## 2022-11-29 PROCEDURE — 20550 TENDON SHEATH: ICD-10-PCS | Mod: 50,51,S$GLB,

## 2022-11-29 PROCEDURE — 73130 X-RAY EXAM OF HAND: CPT | Mod: 26,50,, | Performed by: RADIOLOGY

## 2022-11-29 PROCEDURE — 73130 X-RAY EXAM OF HAND: CPT | Mod: TC,50

## 2022-11-29 PROCEDURE — 4010F PR ACE/ARB THEARPY RXD/TAKEN: ICD-10-PCS | Mod: CPTII,S$GLB,,

## 2022-11-29 PROCEDURE — 3008F PR BODY MASS INDEX (BMI) DOCUMENTED: ICD-10-PCS | Mod: CPTII,S$GLB,,

## 2022-11-29 PROCEDURE — 3288F FALL RISK ASSESSMENT DOCD: CPT | Mod: CPTII,S$GLB,,

## 2022-11-29 PROCEDURE — 1101F PR PT FALLS ASSESS DOC 0-1 FALLS W/OUT INJ PAST YR: ICD-10-PCS | Mod: CPTII,S$GLB,,

## 2022-11-29 PROCEDURE — 1125F PR PAIN SEVERITY QUANTIFIED, PAIN PRESENT: ICD-10-PCS | Mod: CPTII,S$GLB,,

## 2022-11-29 PROCEDURE — 3288F PR FALLS RISK ASSESSMENT DOCUMENTED: ICD-10-PCS | Mod: CPTII,S$GLB,,

## 2022-11-29 PROCEDURE — 99999 PR PBB SHADOW E&M-EST. PATIENT-LVL III: ICD-10-PCS | Mod: PBBFAC,,,

## 2022-11-29 PROCEDURE — 1101F PT FALLS ASSESS-DOCD LE1/YR: CPT | Mod: CPTII,S$GLB,,

## 2022-11-29 PROCEDURE — 99999 PR PBB SHADOW E&M-EST. PATIENT-LVL III: CPT | Mod: PBBFAC,,,

## 2022-11-29 PROCEDURE — 1125F AMNT PAIN NOTED PAIN PRSNT: CPT | Mod: CPTII,S$GLB,,

## 2022-11-29 RX ORDER — TRIAMCINOLONE ACETONIDE 40 MG/ML
40 INJECTION, SUSPENSION INTRA-ARTICULAR; INTRAMUSCULAR
Status: DISCONTINUED | OUTPATIENT
Start: 2022-11-29 | End: 2022-11-29 | Stop reason: HOSPADM

## 2022-11-29 RX ORDER — CELECOXIB 200 MG/1
200 CAPSULE ORAL
COMMUNITY

## 2022-11-29 RX ORDER — LISINOPRIL AND HYDROCHLOROTHIAZIDE 12.5; 2 MG/1; MG/1
TABLET ORAL
COMMUNITY

## 2022-11-29 RX ADMIN — TRIAMCINOLONE ACETONIDE 40 MG: 40 INJECTION, SUSPENSION INTRA-ARTICULAR; INTRAMUSCULAR at 09:11

## 2022-11-29 NOTE — PROCEDURES
Tendon Sheath    Date/Time: 11/29/2022 9:30 AM  Performed by: Monica Leonard PA-C  Authorized by: Monica Leonard PA-C     Consent Done?:  Yes (Verbal)  Indications:  Pain  Site marked: the procedure site was marked    Timeout: prior to procedure the correct patient, procedure, and site was verified    Prep: patient was prepped and draped in usual sterile fashion      Local anesthesia used?: Yes    Local anesthetic:  Lidocaine 2% without epinephrine  Anesthetic total (ml):  0.5    Location:  Thumb  Site:  L thumb flexor tendon sheath  Ultrasonic guidance for needle placement?: No    Needle size:  25 G  Approach:  Volar  Medications:  40 mg triamcinolone acetonide 40 mg/mL  Patient tolerance:  Patient tolerated the procedure well with no immediate complications  Tendon Sheath    Date/Time: 11/29/2022 9:30 AM  Performed by: Monica Leonard PA-C  Authorized by: Monica Leonard PA-C     Consent Done?:  Yes (Verbal)  Indications:  Pain  Site marked: the procedure site was marked    Timeout: prior to procedure the correct patient, procedure, and site was verified    Prep: patient was prepped and draped in usual sterile fashion      Local anesthesia used?: Yes    Local anesthetic:  Lidocaine 2% without epinephrine  Anesthetic total (ml):  0.5    Location:  Long finger  Site:  R long flexor tendon sheath  Ultrasonic guidance for needle placement?: No    Needle size:  25 G  Approach:  Volar  Medications:  40 mg triamcinolone acetonide 40 mg/mL  Patient tolerance:  Patient tolerated the procedure well with no immediate complications  Carpal Tunnel    Date/Time: 11/29/2022 9:30 AM  Performed by: Monica Leonard PA-C  Authorized by: Monica Leonard PA-C     Consent Done?:  Yes (Verbal)  Indications:  Pain  Site marked: the procedure site was marked    Timeout: prior to procedure the correct patient, procedure, and site was verified    Prep: patient was prepped and draped in usual sterile fashion      Local anesthesia used?: Yes    Local  anesthetic:  Lidocaine 2% without epinephrine  Anesthetic total (ml):  1    Location:  Wrist (right)  Ultrasonic Guidance for Needle Placement?: No    Needle size:  25 G  Approach:  Volar  Medications:  40 mg triamcinolone acetonide 40 mg/mL  Patient tolerance:  Patient tolerated the procedure well with no immediate complications

## 2023-03-23 DIAGNOSIS — M25.522 BILATERAL ELBOW JOINT PAIN: ICD-10-CM

## 2023-03-23 DIAGNOSIS — M25.521 BILATERAL ELBOW JOINT PAIN: ICD-10-CM

## 2023-03-23 DIAGNOSIS — M79.642 BILATERAL HAND PAIN: Primary | ICD-10-CM

## 2023-03-23 DIAGNOSIS — M79.641 BILATERAL HAND PAIN: Primary | ICD-10-CM

## 2023-03-27 ENCOUNTER — TELEPHONE (OUTPATIENT)
Dept: ORTHOPEDICS | Facility: CLINIC | Age: 68
End: 2023-03-27
Payer: MEDICARE

## 2023-03-27 NOTE — TELEPHONE ENCOUNTER
Spoke to the patient an informed her that Dr. Meyer does still take her insurance an that Im not sure as to why they told her that. She was very gratefully an will keep her appointment for tomorrow.       ----- Message from Dayan Pearce sent at 3/27/2023 11:44 AM CDT -----  Contact: GRISELDA MATHEW [[phone]][phone]  General Call    Patient received notice that Dr. Meyer will no  longer see her due to her insurance. She asks for guidance on transferring care to another appropriate provider. Please return her call: [phone]

## 2023-03-28 ENCOUNTER — OFFICE VISIT (OUTPATIENT)
Dept: ORTHOPEDICS | Facility: CLINIC | Age: 68
End: 2023-03-28
Payer: MEDICARE

## 2023-03-28 VITALS — BODY MASS INDEX: 27.78 KG/M2 | WEIGHT: 177 LBS | HEIGHT: 67 IN

## 2023-03-28 DIAGNOSIS — M65.30 TRIGGER FINGER, ACQUIRED: Primary | ICD-10-CM

## 2023-03-28 PROCEDURE — 1126F AMNT PAIN NOTED NONE PRSNT: CPT | Mod: CPTII,S$GLB,, | Performed by: ORTHOPAEDIC SURGERY

## 2023-03-28 PROCEDURE — 20550 TENDON SHEATH: ICD-10-PCS | Mod: 51,LT,S$GLB, | Performed by: ORTHOPAEDIC SURGERY

## 2023-03-28 PROCEDURE — 1126F PR PAIN SEVERITY QUANTIFIED, NO PAIN PRESENT: ICD-10-PCS | Mod: CPTII,S$GLB,, | Performed by: ORTHOPAEDIC SURGERY

## 2023-03-28 PROCEDURE — 99213 PR OFFICE/OUTPT VISIT, EST, LEVL III, 20-29 MIN: ICD-10-PCS | Mod: 25,S$GLB,, | Performed by: ORTHOPAEDIC SURGERY

## 2023-03-28 PROCEDURE — 3288F PR FALLS RISK ASSESSMENT DOCUMENTED: ICD-10-PCS | Mod: CPTII,S$GLB,, | Performed by: ORTHOPAEDIC SURGERY

## 2023-03-28 PROCEDURE — 1159F MED LIST DOCD IN RCRD: CPT | Mod: CPTII,S$GLB,, | Performed by: ORTHOPAEDIC SURGERY

## 2023-03-28 PROCEDURE — 1159F PR MEDICATION LIST DOCUMENTED IN MEDICAL RECORD: ICD-10-PCS | Mod: CPTII,S$GLB,, | Performed by: ORTHOPAEDIC SURGERY

## 2023-03-28 PROCEDURE — 1101F PT FALLS ASSESS-DOCD LE1/YR: CPT | Mod: CPTII,S$GLB,, | Performed by: ORTHOPAEDIC SURGERY

## 2023-03-28 PROCEDURE — 1160F PR REVIEW ALL MEDS BY PRESCRIBER/CLIN PHARMACIST DOCUMENTED: ICD-10-PCS | Mod: CPTII,S$GLB,, | Performed by: ORTHOPAEDIC SURGERY

## 2023-03-28 PROCEDURE — 99999 PR PBB SHADOW E&M-EST. PATIENT-LVL III: CPT | Mod: PBBFAC,,, | Performed by: ORTHOPAEDIC SURGERY

## 2023-03-28 PROCEDURE — 3288F FALL RISK ASSESSMENT DOCD: CPT | Mod: CPTII,S$GLB,, | Performed by: ORTHOPAEDIC SURGERY

## 2023-03-28 PROCEDURE — 1101F PR PT FALLS ASSESS DOC 0-1 FALLS W/OUT INJ PAST YR: ICD-10-PCS | Mod: CPTII,S$GLB,, | Performed by: ORTHOPAEDIC SURGERY

## 2023-03-28 PROCEDURE — 20550 NJX 1 TENDON SHEATH/LIGAMENT: CPT | Mod: 51,LT,S$GLB, | Performed by: ORTHOPAEDIC SURGERY

## 2023-03-28 PROCEDURE — 1160F RVW MEDS BY RX/DR IN RCRD: CPT | Mod: CPTII,S$GLB,, | Performed by: ORTHOPAEDIC SURGERY

## 2023-03-28 PROCEDURE — 3008F BODY MASS INDEX DOCD: CPT | Mod: CPTII,S$GLB,, | Performed by: ORTHOPAEDIC SURGERY

## 2023-03-28 PROCEDURE — 99213 OFFICE O/P EST LOW 20 MIN: CPT | Mod: 25,S$GLB,, | Performed by: ORTHOPAEDIC SURGERY

## 2023-03-28 PROCEDURE — 99999 PR PBB SHADOW E&M-EST. PATIENT-LVL III: ICD-10-PCS | Mod: PBBFAC,,, | Performed by: ORTHOPAEDIC SURGERY

## 2023-03-28 PROCEDURE — 3008F PR BODY MASS INDEX (BMI) DOCUMENTED: ICD-10-PCS | Mod: CPTII,S$GLB,, | Performed by: ORTHOPAEDIC SURGERY

## 2023-03-28 RX ORDER — TRIAMCINOLONE ACETONIDE 40 MG/ML
40 INJECTION, SUSPENSION INTRA-ARTICULAR; INTRAMUSCULAR
Status: DISCONTINUED | OUTPATIENT
Start: 2023-03-28 | End: 2023-03-28 | Stop reason: HOSPADM

## 2023-03-28 RX ADMIN — TRIAMCINOLONE ACETONIDE 40 MG: 40 INJECTION, SUSPENSION INTRA-ARTICULAR; INTRAMUSCULAR at 11:03

## 2023-03-28 NOTE — PROGRESS NOTES
Subjective:     Patient ID: Alea Terrazas is a 67 y.o. female.    Chief Complaint: Pain and Numbness of the Right Hand, Pain and Numbness of the Left Hand, Pain and Numbness of the Left Elbow, and Pain and Numbness of the Right Elbow      HPI:  The patient is a 67-year-old female with left long and ring trigger fingers and right index trigger finger.  She was injected left thumb 11/29/2022 with good relief until recently and requests reinjection left long and ring trigger fingers and right index trigger finger    Past Medical History:   Diagnosis Date    Anxiety     Arthritis     Carpal tunnel syndrome     Chronic neck and back pain     Hypertension      Past Surgical History:   Procedure Laterality Date    CARPAL TUNNEL RELEASE Left     CARPAL TUNNEL RELEASE Right 9/5/2019    Procedure: RELEASE, CARPAL TUNNEL;  Surgeon: Billy Meyer MD;  Location: [facility] OR;  Service: Orthopedics;  Laterality: Right;  Confirmed anesthesia type with CRNA.    CERVICAL FUSION  2016, 2017    ROTATOR CUFF REPAIR Right 2015    TRIGGER FINGER RELEASE Right 9/5/2019    Procedure: RELEASE, TRIGGER FINGER;  Surgeon: Billy Meyer MD;  Location: [facility] OR;  Service: Orthopedics;  Laterality: Right;  right thumb   Confirmed anesthesia type with CRNA.    ULNAR TUNNEL RELEASE Right 10/1/2020    Procedure: RELEASE, CUBITAL TUNNEL;  Surgeon: Billy Meyer MD;  Location: [facility] OR;  Service: Orthopedics;  Laterality: Right;  lower arm (near elbow)  Per MARK Kent CRNA, general anesthesia used.  Per surgeon, anterior transposition ulnar nerve was NOT peformed.       Family History   Problem Relation Age of Onset    Cancer Father     Diabetes Father     Heart failure Father     COPD Mother 41    Kidney failure Mother     No Known Problems Brother     No Known Problems Brother      Social History     Socioeconomic History    Marital status:    Tobacco Use    Smoking status: Some Days     Packs/day: 0.50     Years: 41.00      Pack years: 20.50     Types: Cigarettes    Smokeless tobacco: Never   Substance and Sexual Activity    Alcohol use: Yes     Comment: occasionally    Drug use: No     Medication List with Changes/Refills   Current Medications    ALBUTEROL (PROVENTIL/VENTOLIN HFA) 90 MCG/ACTUATION INHALER    INHALE 1 TO 2 PUFFS BY MOUTH INTO THE LUNGS EVERY 4-6 HOURS AS NEEDED    AQUANAZ 10- MG TAB    Take 1 tablet by mouth every 6 (six) hours as needed. Hold 3 days prior to surgery    CELECOXIB (CELEBREX) 200 MG CAPSULE    Take 200 mg by mouth.    CETIRIZINE (ZYRTEC) 10 MG TABLET    Take 10 mg by mouth once daily.     CHOLECALCIFEROL, VITAMIN D3, 1,250 MCG (50,000 UNIT) CAPSULE    Take 50,000 Units by mouth every 7 days.    CYCLOBENZAPRINE (FLEXERIL) 10 MG TABLET    Take 10 mg by mouth 3 (three) times daily as needed.     ERGOCALCIFEROL (ERGOCALCIFEROL) 50,000 UNIT CAP    Take 50,000 Units by mouth every 7 days.     FLUTICASONE (FLONASE) 50 MCG/ACTUATION NASAL SPRAY    2 sprays by Each Nare route once daily.    GABAPENTIN (NEURONTIN) 300 MG CAPSULE    Take 300 mg by mouth nightly as needed.     GABAPENTIN ENACARBIL 600 MG TBSR    Take 600 mg by mouth.    HYDROCHLOROTHIAZIDE (HYDRODIURIL) 25 MG TABLET    Take 25 mg by mouth once daily.    LISINOPRIL-HYDROCHLOROTHIAZIDE (PRINZIDE,ZESTORETIC) 20-12.5 MG PER TABLET    Take by mouth.    MELOXICAM (MOBIC) 15 MG TABLET    Take 15 mg by mouth once daily. Hold 4 days prior to surgery    OXYCODONE-ACETAMINOPHEN (PERCOCET) 5-325 MG PER TABLET    Take 1 tablet by mouth 2 (two) times daily.     TRIAMTERENE-HYDROCHLOROTHIAZIDE 37.5-25 MG (DYAZIDE) 37.5-25 MG PER CAPSULE    Dyazide Take capsule 1 time per day No date recorded capsule 1 time per day No route recorded No set duration recorded No set duration amount recorded suspended 37.5-25 mg     Review of patient's allergies indicates:   Allergen Reactions    Codeine      Review of Systems   Constitutional: Negative for malaise/fatigue.    HENT:  Negative for hearing loss.    Eyes:  Negative for double vision and visual disturbance.   Cardiovascular:  Negative for chest pain.   Respiratory:  Negative for shortness of breath.    Endocrine: Negative for cold intolerance.   Hematologic/Lymphatic: Does not bruise/bleed easily.   Skin:  Negative for poor wound healing and suspicious lesions.   Musculoskeletal:  Positive for back pain and neck pain. Negative for gout, joint pain and joint swelling.   Gastrointestinal:  Negative for nausea and vomiting.   Genitourinary:  Negative for dysuria.   Neurological:  Positive for numbness, paresthesias and sensory change.   Psychiatric/Behavioral:  Positive for substance abuse. Negative for depression and memory loss. The patient is not nervous/anxious.    Allergic/Immunologic: Negative for persistent infections.     Objective:   Body mass index is 27.72 kg/m².  There were no vitals filed for this visit.             General    Constitutional: She is oriented to person, place, and time. She appears well-developed and well-nourished. No distress.   HENT:   Head: Normocephalic.   Eyes: EOM are normal.   Pulmonary/Chest: Effort normal.   Neurological: She is oriented to person, place, and time.   Psychiatric: She has a normal mood and affect.             Right Hand/Wrist Exam     Inspection   Scars: Wrist - present Hand -  present  Effusion: Wrist - absent Hand -  absent    Pain   Hand - The patient exhibits pain of the index MCP.    Other     Neuorologic Exam    Median Distribution: normal  Ulnar Distribution: normal  Radial Distribution: normal    Comments:  The patient has active triggering right index finger and pain about the metacarpophalangeal joint with palpable nodule that moves with tendon excursion      Left Hand/Wrist Exam     Inspection   Scars: Wrist - present Hand -  present  Effusion: Wrist - absent Hand -  absent    Pain   Hand - The patient exhibits pain of the middle MCP and ring MCP.    Other      Sensory Exam  Median Distribution: normal  Ulnar Distribution: normal  Radial Distribution: normal    Comments:  The patient has active triggering left long and ring fingers with palpable nodules that move with tendon excursion.          Vascular Exam       Capillary Refill  Right Hand: normal capillary refill  Left Hand: normal capillary refill        Relevant imaging results reviewed and interpreted by me, discussed with the patient and / or family today radiographs were not obtained today  Assessment:     Encounter Diagnosis   Name Primary?    Trigger finger, acquired Yes    Right index and left long and ring trigger fingers    Plan:     The patient injected right index trigger finger with 0.5 cc of Kenalog and 0.5 cc of 2% plain lidocaine.  She was injected in the left long and ring trigger fingers with 0.5 cc of Kenalog and 0.5 cc of 2% plain lidocaine.  She will return in 3 weeks if not improved.                Disclaimer: This note was prepared using a voice recognition system and is likely to have sound alike errors within the text.

## 2023-03-28 NOTE — PROCEDURES
Tendon Sheath    Date/Time: 3/28/2023 11:15 AM  Performed by: Billy Meyer MD  Authorized by: Billy Meyer MD     Consent Done?:  Yes (Verbal)  Indications:  Pain  Timeout: prior to procedure the correct patient, procedure, and site was verified    Prep: patient was prepped and draped in usual sterile fashion      Local anesthesia used?: Yes    Local anesthetic:  Lidocaine 2% without epinephrine  Anesthetic total (ml):  0.5    Location:  Long finger  Site:  L long flexor tendon sheath  Ultrasonic guidance for needle placement?: No    Needle size:  25 G  Approach:  Volar  Medications:  40 mg triamcinolone acetonide 40 mg/mL

## 2023-03-28 NOTE — PROCEDURES
Tendon Sheath    Date/Time: 3/28/2023 11:15 AM  Performed by: Billy Meyer MD  Authorized by: Billy Meyer MD     Consent Done?:  Yes (Verbal)  Indications:  Pain  Timeout: prior to procedure the correct patient, procedure, and site was verified    Prep: patient was prepped and draped in usual sterile fashion      Local anesthesia used?: Yes    Local anesthetic:  Lidocaine 2% without epinephrine  Anesthetic total (ml):  0.5    Location:  Index finger  Site:  R index flexor tendon sheath  Ultrasonic guidance for needle placement?: No    Needle size:  25 G  Approach:  Volar  Medications:  40 mg triamcinolone acetonide 40 mg/mL

## 2023-03-28 NOTE — PROCEDURES
Tendon Sheath    Date/Time: 3/28/2023 11:15 AM  Performed by: Billy Meyer MD  Authorized by: Billy Meyer MD     Consent Done?:  Yes (Verbal)  Indications:  Pain  Timeout: prior to procedure the correct patient, procedure, and site was verified    Prep: patient was prepped and draped in usual sterile fashion      Local anesthesia used?: Yes    Local anesthetic:  Lidocaine 2% without epinephrine  Anesthetic total (ml):  0.5    Location:  Ring finger  Site:  L ring flexor tendon sheath  Ultrasonic guidance for needle placement?: No    Needle size:  25 G  Approach:  Volar  Medications:  40 mg triamcinolone acetonide 40 mg/mL

## 2023-07-13 ENCOUNTER — TELEPHONE (OUTPATIENT)
Dept: ORTHOPEDICS | Facility: CLINIC | Age: 68
End: 2023-07-13
Payer: MEDICARE

## 2023-07-13 NOTE — TELEPHONE ENCOUNTER
Spoke to the patient who requested to see Monica for injections instead of waiting for Dr. Meyer     ----- Message from Irena Hamilton sent at 7/13/2023  8:28 AM CDT -----  Contact: melvin Cosby  .Type:  Same Day Appointment Request    Caller is requesting a same day appointment.  Caller declined first available appointment listed below.    Name of Caller: melvin Cosby   When is the first available appointment? 8/29/2023  Symptoms: Pain in both hands, mostly right  Best Call Back Number: .[phone]   Additional Information: Please give pt a call back. Thanks LILLIAM

## 2023-07-14 ENCOUNTER — OFFICE VISIT (OUTPATIENT)
Dept: ORTHOPEDICS | Facility: CLINIC | Age: 68
End: 2023-07-14
Payer: MEDICARE

## 2023-07-14 VITALS — BODY MASS INDEX: 27.78 KG/M2 | WEIGHT: 177 LBS | HEIGHT: 67 IN

## 2023-07-14 DIAGNOSIS — G56.01 RIGHT CARPAL TUNNEL SYNDROME: Primary | ICD-10-CM

## 2023-07-14 PROCEDURE — 3288F PR FALLS RISK ASSESSMENT DOCUMENTED: ICD-10-PCS | Mod: CPTII,S$GLB,,

## 2023-07-14 PROCEDURE — 20526 THER INJECTION CARP TUNNEL: CPT | Mod: RT,S$GLB,,

## 2023-07-14 PROCEDURE — 1125F PR PAIN SEVERITY QUANTIFIED, PAIN PRESENT: ICD-10-PCS | Mod: CPTII,S$GLB,,

## 2023-07-14 PROCEDURE — 99999 PR PBB SHADOW E&M-EST. PATIENT-LVL III: CPT | Mod: PBBFAC,,,

## 2023-07-14 PROCEDURE — 1101F PT FALLS ASSESS-DOCD LE1/YR: CPT | Mod: CPTII,S$GLB,,

## 2023-07-14 PROCEDURE — 99213 OFFICE O/P EST LOW 20 MIN: CPT | Mod: 25,S$GLB,,

## 2023-07-14 PROCEDURE — 3008F BODY MASS INDEX DOCD: CPT | Mod: CPTII,S$GLB,,

## 2023-07-14 PROCEDURE — 20526 PR INJECT CARPAL TUNNEL: ICD-10-PCS | Mod: RT,S$GLB,,

## 2023-07-14 PROCEDURE — 99213 PR OFFICE/OUTPT VISIT, EST, LEVL III, 20-29 MIN: ICD-10-PCS | Mod: 25,S$GLB,,

## 2023-07-14 PROCEDURE — 1159F PR MEDICATION LIST DOCUMENTED IN MEDICAL RECORD: ICD-10-PCS | Mod: CPTII,S$GLB,,

## 2023-07-14 PROCEDURE — 3288F FALL RISK ASSESSMENT DOCD: CPT | Mod: CPTII,S$GLB,,

## 2023-07-14 PROCEDURE — 1159F MED LIST DOCD IN RCRD: CPT | Mod: CPTII,S$GLB,,

## 2023-07-14 PROCEDURE — 3008F PR BODY MASS INDEX (BMI) DOCUMENTED: ICD-10-PCS | Mod: CPTII,S$GLB,,

## 2023-07-14 PROCEDURE — 99999 PR PBB SHADOW E&M-EST. PATIENT-LVL III: ICD-10-PCS | Mod: PBBFAC,,,

## 2023-07-14 PROCEDURE — 1125F AMNT PAIN NOTED PAIN PRSNT: CPT | Mod: CPTII,S$GLB,,

## 2023-07-14 PROCEDURE — 1101F PR PT FALLS ASSESS DOC 0-1 FALLS W/OUT INJ PAST YR: ICD-10-PCS | Mod: CPTII,S$GLB,,

## 2023-07-14 RX ORDER — TRIAMCINOLONE ACETONIDE 40 MG/ML
40 INJECTION, SUSPENSION INTRA-ARTICULAR; INTRAMUSCULAR
Status: DISCONTINUED | OUTPATIENT
Start: 2023-07-14 | End: 2023-07-14 | Stop reason: HOSPADM

## 2023-07-14 RX ADMIN — TRIAMCINOLONE ACETONIDE 40 MG: 40 INJECTION, SUSPENSION INTRA-ARTICULAR; INTRAMUSCULAR at 03:07

## 2023-07-14 NOTE — PROGRESS NOTES
SUBJECTIVE:      Chief Complaint: right hand pain    Referring Provider: No ref. provider found     History of Present Illness: The patient is a 67 y.o female with complaints of right hand pain. She was last injected 3/28/23 by Dr. Meyer to the L middle, L ring, R index trigger fingers.   Today, pain is 8/10. The pain is located in the right hand palmar side near the thumb and index finger. Pain is aching, throbbing, and shooting in quality. Bothers her mostly at night.  She denies injury or trauma to the hand. She has tried ice/heat for the pain and takes gabapentin. The patient denies any fevers, chills, N/V, D/C and presents for f/u.    Interval hx 3/28/23 (Dr. Meyer): The patient is a 67-year-old female with left long and ring trigger fingers and right index trigger finger.  She was injected left thumb 11/29/2022 with good relief until recently and requests reinjection left long and ring trigger fingers and right index trigger finger    Interval hx 11/29/22: Patient is a 67 y.o. right hand dominant female who presents today with complaints of bilateral hand pain, right worse than left.  She is a history of right long, ring, and small trigger fingers, last CSI was 12/14/2021 with good results.  Today, she admits that the right small, ring, and long fingers are locking again.  She also says that the left thumb and index fingers are locking as well.  She states that she massages the fingers for relief.  Reports that the weather will affect the hands as well.  She is taking Percocet and Mobic for the pain.  She admits to numbness in both hands, especially in the morning.  The numbness comes and goes.  She states that she feels like her  is weak.  She had a right carpal tunnel release done in 2019 by Dr. Meyer. The patient denies any fevers, chills, N/V, D/C and presents for evaluation.    Interval hx 12/14/21 (Dr. Meyer): The patient is a 66-year-old female with a right long ring and small trigger  finger for the last several months.  She was last injected 10/14/2020 with good results until recently and requests reinjection.    Past Medical History:   Diagnosis Date    Anxiety     Arthritis     Carpal tunnel syndrome     Chronic neck and back pain     Hypertension      Past Surgical History:   Procedure Laterality Date    CARPAL TUNNEL RELEASE Left     CARPAL TUNNEL RELEASE Right 9/5/2019    Procedure: RELEASE, CARPAL TUNNEL;  Surgeon: Billy Meyer MD;  Location: [facility];  Service: Orthopedics;  Laterality: Right;  Confirmed anesthesia type with CRNA.    CERVICAL FUSION  2016, 2017    ROTATOR CUFF REPAIR Right 2015    TRIGGER FINGER RELEASE Right 9/5/2019    Procedure: RELEASE, TRIGGER FINGER;  Surgeon: Billy Meyer MD;  Location: [facility] OR;  Service: Orthopedics;  Laterality: Right;  right thumb   Confirmed anesthesia type with CRNA.    ULNAR TUNNEL RELEASE Right 10/1/2020    Procedure: RELEASE, CUBITAL TUNNEL;  Surgeon: Billy Meyer MD;  Location: [facility];  Service: Orthopedics;  Laterality: Right;  lower arm (near elbow)  Per MARK Kent CRNA, general anesthesia used.  Per surgeon, anterior transposition ulnar nerve was NOT peformed.       Review of patient's allergies indicates:   Allergen Reactions    Codeine      Social History     Social History Narrative    Not on file     Family History   Problem Relation Age of Onset    Cancer Father     Diabetes Father     Heart failure Father     COPD Mother 41    Kidney failure Mother     No Known Problems Brother     No Known Problems Brother          Current Outpatient Medications:     albuterol (PROVENTIL/VENTOLIN HFA) 90 mcg/actuation inhaler, INHALE 1 TO 2 PUFFS BY MOUTH INTO THE LUNGS EVERY 4-6 HOURS AS NEEDED, Disp: , Rfl:     AQUANAZ 10- mg Tab, Take 1 tablet by mouth every 6 (six) hours as needed. Hold 3 days prior to surgery, Disp: , Rfl: 0    celecoxib (CELEBREX) 200 MG capsule, Take 200 mg by mouth., Disp: , Rfl:      cetirizine (ZYRTEC) 10 MG tablet, Take 10 mg by mouth once daily. , Disp: , Rfl: 0    cholecalciferol, vitamin D3, 1,250 mcg (50,000 unit) capsule, Take 50,000 Units by mouth every 7 days., Disp: , Rfl:     cyclobenzaprine (FLEXERIL) 10 MG tablet, Take 10 mg by mouth 3 (three) times daily as needed. , Disp: , Rfl:     ergocalciferol (ERGOCALCIFEROL) 50,000 unit Cap, Take 50,000 Units by mouth every 7 days. , Disp: , Rfl:     fluticasone (FLONASE) 50 mcg/actuation nasal spray, 2 sprays by Each Nare route once daily., Disp: , Rfl: 0    gabapentin (NEURONTIN) 300 MG capsule, Take 300 mg by mouth nightly as needed. , Disp: , Rfl:     gabapentin enacarbil 600 mg TbSR, Take 600 mg by mouth., Disp: , Rfl:     hydroCHLOROthiazide (HYDRODIURIL) 25 MG tablet, Take 25 mg by mouth once daily., Disp: , Rfl:     lisinopriL-hydrochlorothiazide (PRINZIDE,ZESTORETIC) 20-12.5 mg per tablet, Take by mouth., Disp: , Rfl:     meloxicam (MOBIC) 15 MG tablet, Take 15 mg by mouth once daily. Hold 4 days prior to surgery, Disp: , Rfl: 0    oxyCODONE-acetaminophen (PERCOCET) 5-325 mg per tablet, Take 1 tablet by mouth 2 (two) times daily. , Disp: , Rfl: 0    triamterene-hydrochlorothiazide 37.5-25 mg (DYAZIDE) 37.5-25 mg per capsule, Dyazide Take capsule 1 time per day No date recorded capsule 1 time per day No route recorded No set duration recorded No set duration amount recorded suspended 37.5-25 mg, Disp: , Rfl:   No current facility-administered medications for this visit.    Facility-Administered Medications Ordered in Other Visits:     lactated ringers infusion, , Intravenous, On Call Procedure, LINDA Jama, New Bag at 10/01/20 0835    lactated ringers infusion, , Intravenous, Continuous, Airam Arteaga MD, Last Rate: 10 mL/hr at 10/01/20 0745, New Bag at 10/01/20 0745    nozaseptin (NOZIN) nasal , , Each Nostril, On Call Procedure, LINDA Jama, Given at 10/01/20 0745      Review of  "Systems:  Constitutional: Negative for chills and fever.   Respiratory: Negative for cough and shortness of breath.    Gastrointestinal: Negative for nausea and vomiting.   Skin: Negative for rash.   Neurological: Negative for dizziness and headaches.   Psychiatric/Behavioral: Negative for depression.   MSK as in HPI     OBJECTIVE:      Vital Signs (Most Recent):  Vitals:    07/14/23 1402   Weight: 80.3 kg (177 lb 0.5 oz)   Height: 5' 7" (1.702 m)       Body mass index is 27.73 kg/m².      Physical Exam:  Constitutional: The patient appears well-developed and well-nourished. No distress.   Skin: No lesions appreciated  Head: Normocephalic and atraumatic.   Nose: Nose normal.   Ears: No deformities seen  Eyes: Conjunctivae and EOM are normal.   Neck: No tracheal deviation present.   Cardiovascular: Normal rate and intact distal pulses.    Pulmonary/Chest: Effort normal. No respiratory distress.   Abdominal: There is no guarding.   Neurological: The patient is alert.   Psychiatric: The patient has a normal mood and affect.     General    Vitals reviewed.            Right Hand/Wrist Exam     Inspection   Scars: Wrist - absent Hand -  present (Well-healed carpal tunnel release scar)  Effusion: Wrist - absent Hand -  absent    Tests   Tinel's sign (median nerve): positive    Atrophy   Thenar:  negative    Other     Neuorologic Exam    Median Distribution: normal  Ulnar Distribution: normal  Radial Distribution: normal    Comments:  TTP distal to thenar area  +Tinels at wrist  No motor or sensory deficits  No signs of infection      Left Hand/Wrist Exam     Inspection   Scars: Wrist - absent Hand -  absent  Effusion: Wrist - absent Hand -  absent    Other     Sensory Exam  Median Distribution: normal  Ulnar Distribution: normal  Radial Distribution: normal      Right Elbow Exam     Tests   Tinel's sign (cubital tunnel): negative          Vascular Exam       Capillary Refill  Right Hand: normal capillary refill  Left Hand: " normal capillary refill        Diagnostic Results:    EXAMINATION:  XR HAND COMPLETE 3 VIEWS BILATERAL     CLINICAL HISTORY:  . Pain in right hand     TECHNIQUE:  PA, lateral, and oblique views of both hands were performed.     COMPARISON:  06/04/2019     FINDINGS:  No acute fracture or dislocation identified.  Mild degenerative changes seen at either triscaphe joint.  Even more mild degenerative changes seen at either 1st CMC joint.  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  No erosive changes are identified.     Impression:     As above        Electronically signed by: Dandy Lara DO  Date:                                            11/29/2022  Time:                                           09:35       EMG - 8/20/20  IMPRESSION  1. ABNORMAL study  2. 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  There was a chronic radiculopathy of the C8 and T1 nerve roots    ASSESSMENT/PLAN:        ICD-10-CM ICD-9-CM   1. Right carpal tunnel syndrome  G56.01 354.0       Orders Placed This Encounter    Carpal Tunnel       Orders Placed This Encounter   Procedures    Carpal Tunnel       Plan:     -discussed with patient that her pain is likely from her carpal tunnel syndrome. She would like repeat injection today  - R carpal tunnel injection today. Pt must wait at least 3 months between injections  -continue conservative treatment:  Ice/heat as needed, bracing, activity modifications, rest, NSAIDs as tolerated  -follow-up as needed    PROCEDURE:  I have explained the risks, benefits, and alternatives of the procedure in detail.  The patient voices understanding and all questions have been answered.  The patient agrees to proceed as planned. So after a sterile prep of the skin in the normal fashion the right carpal tunnel is injected from the volar approach using a 25 gauge needle with a combination of 1cc 2% plain  lidocaine and 1cc of kenalog.  The patient is cautioned and immediate relief of pain is secondary to the local anesthetic and will be temporary.  After the anesthetic wears off there may be a increase in pain that may last for a few hours or a few days and they should use ice to help alleviate this flare up of pain. Patient tolerated the procedure well.       Should the patient's symptoms worsen, persist, or fail to improve they should return for reevaluation and I would be happy to see them back anytime.        Monica Leonard PA-C   [facility]     Please be aware that this note has been generated with the assistance of adrian voice-to-text.  Please excuse any spelling or grammatical errors.

## 2023-07-14 NOTE — PROCEDURES
Carpal Tunnel    Date/Time: 7/14/2023 3:30 PM  Performed by: Monica Leonard PA-C  Authorized by: Monica Leonard PA-C     Consent Done?:  Yes (Verbal)  Indications:  Pain  Site marked: the procedure site was marked    Timeout: prior to procedure the correct patient, procedure, and site was verified    Prep: patient was prepped and draped in usual sterile fashion      Local anesthesia used?: Yes    Local anesthetic:  Lidocaine 2% without epinephrine  Anesthetic total (ml):  1    Location:  Wrist (right)  Ultrasonic Guidance for Needle Placement?: No    Needle size:  25 G  Approach:  Volar  Medications:  40 mg triamcinolone acetonide 40 mg/mL  Patient tolerance:  Patient tolerated the procedure well with no immediate complications

## 2023-08-15 ENCOUNTER — OFFICE VISIT (OUTPATIENT)
Dept: ORTHOPEDICS | Facility: CLINIC | Age: 68
End: 2023-08-15
Payer: MEDICARE

## 2023-08-15 VITALS — WEIGHT: 177 LBS | BODY MASS INDEX: 27.78 KG/M2 | HEIGHT: 67 IN

## 2023-08-15 DIAGNOSIS — M54.12 CERVICAL RADICULOPATHY: ICD-10-CM

## 2023-08-15 DIAGNOSIS — R20.2 NUMBNESS AND TINGLING: Primary | ICD-10-CM

## 2023-08-15 DIAGNOSIS — G56.22 CUBITAL TUNNEL SYNDROME ON LEFT: Primary | ICD-10-CM

## 2023-08-15 DIAGNOSIS — R20.0 NUMBNESS AND TINGLING: Primary | ICD-10-CM

## 2023-08-15 PROCEDURE — 99213 PR OFFICE/OUTPT VISIT, EST, LEVL III, 20-29 MIN: ICD-10-PCS | Mod: S$GLB,,, | Performed by: ORTHOPAEDIC SURGERY

## 2023-08-15 PROCEDURE — 1101F PR PT FALLS ASSESS DOC 0-1 FALLS W/OUT INJ PAST YR: ICD-10-PCS | Mod: CPTII,S$GLB,, | Performed by: ORTHOPAEDIC SURGERY

## 2023-08-15 PROCEDURE — 99213 OFFICE O/P EST LOW 20 MIN: CPT | Mod: S$GLB,,, | Performed by: ORTHOPAEDIC SURGERY

## 2023-08-15 PROCEDURE — 1159F PR MEDICATION LIST DOCUMENTED IN MEDICAL RECORD: ICD-10-PCS | Mod: CPTII,S$GLB,, | Performed by: ORTHOPAEDIC SURGERY

## 2023-08-15 PROCEDURE — 1126F AMNT PAIN NOTED NONE PRSNT: CPT | Mod: CPTII,S$GLB,, | Performed by: ORTHOPAEDIC SURGERY

## 2023-08-15 PROCEDURE — 3008F BODY MASS INDEX DOCD: CPT | Mod: CPTII,S$GLB,, | Performed by: ORTHOPAEDIC SURGERY

## 2023-08-15 PROCEDURE — 1160F RVW MEDS BY RX/DR IN RCRD: CPT | Mod: CPTII,S$GLB,, | Performed by: ORTHOPAEDIC SURGERY

## 2023-08-15 PROCEDURE — 99999 PR PBB SHADOW E&M-EST. PATIENT-LVL III: ICD-10-PCS | Mod: PBBFAC,,, | Performed by: ORTHOPAEDIC SURGERY

## 2023-08-15 PROCEDURE — 3288F PR FALLS RISK ASSESSMENT DOCUMENTED: ICD-10-PCS | Mod: CPTII,S$GLB,, | Performed by: ORTHOPAEDIC SURGERY

## 2023-08-15 PROCEDURE — 1160F PR REVIEW ALL MEDS BY PRESCRIBER/CLIN PHARMACIST DOCUMENTED: ICD-10-PCS | Mod: CPTII,S$GLB,, | Performed by: ORTHOPAEDIC SURGERY

## 2023-08-15 PROCEDURE — 3288F FALL RISK ASSESSMENT DOCD: CPT | Mod: CPTII,S$GLB,, | Performed by: ORTHOPAEDIC SURGERY

## 2023-08-15 PROCEDURE — 1101F PT FALLS ASSESS-DOCD LE1/YR: CPT | Mod: CPTII,S$GLB,, | Performed by: ORTHOPAEDIC SURGERY

## 2023-08-15 PROCEDURE — 3008F PR BODY MASS INDEX (BMI) DOCUMENTED: ICD-10-PCS | Mod: CPTII,S$GLB,, | Performed by: ORTHOPAEDIC SURGERY

## 2023-08-15 PROCEDURE — 99999 PR PBB SHADOW E&M-EST. PATIENT-LVL III: CPT | Mod: PBBFAC,,, | Performed by: ORTHOPAEDIC SURGERY

## 2023-08-15 PROCEDURE — 1126F PR PAIN SEVERITY QUANTIFIED, NO PAIN PRESENT: ICD-10-PCS | Mod: CPTII,S$GLB,, | Performed by: ORTHOPAEDIC SURGERY

## 2023-08-15 PROCEDURE — 1159F MED LIST DOCD IN RCRD: CPT | Mod: CPTII,S$GLB,, | Performed by: ORTHOPAEDIC SURGERY

## 2023-08-15 NOTE — PROGRESS NOTES
Subjective:     Patient ID: Alea Terrazas is a 67 y.o. female.    Chief Complaint: Pain and Numbness of the Left Elbow      HPI:  The patient is a 67-year-old female seen in follow-up after a right carpal tunnel release 09/05/2019.  She had a cervical fusion in 2016 and 2017.  She had a right cubital tunnel release 10/01/2020 with good results.  She had nerve conduction studies done 08/20/2020 that showed right cubital tunnel syndrome as well as C8 and T1 cervical radiculopathy.  She is done well for her right cubital tunnel release.  She has symptoms compatible with left cubital tunnel syndrome.  She has not had recent nerve conduction studies.    Past Medical History:   Diagnosis Date    Anxiety     Arthritis     Carpal tunnel syndrome     Chronic neck and back pain     Hypertension      Past Surgical History:   Procedure Laterality Date    CARPAL TUNNEL RELEASE Left     CARPAL TUNNEL RELEASE Right 9/5/2019    Procedure: RELEASE, CARPAL TUNNEL;  Surgeon: Billy Meyer MD;  Location: [facility] OR;  Service: Orthopedics;  Laterality: Right;  Confirmed anesthesia type with CRNA.    CERVICAL FUSION  2016, 2017    ROTATOR CUFF REPAIR Right 2015    TRIGGER FINGER RELEASE Right 9/5/2019    Procedure: RELEASE, TRIGGER FINGER;  Surgeon: Billy Meyer MD;  Location: [facility] OR;  Service: Orthopedics;  Laterality: Right;  right thumb   Confirmed anesthesia type with CRNA.    ULNAR TUNNEL RELEASE Right 10/1/2020    Procedure: RELEASE, CUBITAL TUNNEL;  Surgeon: Billy Meyer MD;  Location: [facility] OR;  Service: Orthopedics;  Laterality: Right;  lower arm (near elbow)  Per MARK Kent CRNA, general anesthesia used.  Per surgeon, anterior transposition ulnar nerve was NOT peformed.       Family History   Problem Relation Age of Onset    Cancer Father     Diabetes Father     Heart failure Father     COPD Mother 41    Kidney failure Mother     No Known Problems Brother     No Known Problems Brother      Social  History     Socioeconomic History    Marital status:    Tobacco Use    Smoking status: Some Days     Current packs/day: 0.50     Average packs/day: 0.5 packs/day for 41.0 years (20.5 ttl pk-yrs)     Types: Cigarettes    Smokeless tobacco: Never   Substance and Sexual Activity    Alcohol use: Yes     Comment: occasionally    Drug use: No     Medication List with Changes/Refills   Current Medications    ALBUTEROL (PROVENTIL/VENTOLIN HFA) 90 MCG/ACTUATION INHALER    INHALE 1 TO 2 PUFFS BY MOUTH INTO THE LUNGS EVERY 4-6 HOURS AS NEEDED    AQUANAZ 10- MG TAB    Take 1 tablet by mouth every 6 (six) hours as needed. Hold 3 days prior to surgery    CELECOXIB (CELEBREX) 200 MG CAPSULE    Take 200 mg by mouth.    CETIRIZINE (ZYRTEC) 10 MG TABLET    Take 10 mg by mouth once daily.     CHOLECALCIFEROL, VITAMIN D3, 1,250 MCG (50,000 UNIT) CAPSULE    Take 50,000 Units by mouth every 7 days.    CYCLOBENZAPRINE (FLEXERIL) 10 MG TABLET    Take 10 mg by mouth 3 (three) times daily as needed.     ERGOCALCIFEROL (ERGOCALCIFEROL) 50,000 UNIT CAP    Take 50,000 Units by mouth every 7 days.     FLUTICASONE (FLONASE) 50 MCG/ACTUATION NASAL SPRAY    2 sprays by Each Nare route once daily.    GABAPENTIN (NEURONTIN) 300 MG CAPSULE    Take 300 mg by mouth nightly as needed.     GABAPENTIN ENACARBIL 600 MG TBSR    Take 600 mg by mouth.    HYDROCHLOROTHIAZIDE (HYDRODIURIL) 25 MG TABLET    Take 25 mg by mouth once daily.    LISINOPRIL-HYDROCHLOROTHIAZIDE (PRINZIDE,ZESTORETIC) 20-12.5 MG PER TABLET    Take by mouth.    MELOXICAM (MOBIC) 15 MG TABLET    Take 15 mg by mouth once daily. Hold 4 days prior to surgery    OXYCODONE-ACETAMINOPHEN (PERCOCET) 5-325 MG PER TABLET    Take 1 tablet by mouth 2 (two) times daily.     TRIAMTERENE-HYDROCHLOROTHIAZIDE 37.5-25 MG (DYAZIDE) 37.5-25 MG PER CAPSULE    Dyazide Take capsule 1 time per day No date recorded capsule 1 time per day No route recorded No set duration recorded No set duration  amount recorded suspended 37.5-25 mg     Review of patient's allergies indicates:   Allergen Reactions    Codeine      Review of Systems   Constitutional: Negative for malaise/fatigue.   HENT:  Negative for hearing loss.    Eyes:  Negative for double vision and visual disturbance.   Cardiovascular:  Negative for chest pain.   Respiratory:  Negative for shortness of breath.    Endocrine: Negative for cold intolerance.   Hematologic/Lymphatic: Does not bruise/bleed easily.   Skin:  Negative for poor wound healing and suspicious lesions.   Musculoskeletal:  Positive for neck pain. Negative for gout, joint pain and joint swelling.   Gastrointestinal:  Negative for nausea and vomiting.   Genitourinary:  Negative for dysuria.   Neurological:  Positive for numbness, paresthesias and sensory change.   Psychiatric/Behavioral:  Positive for substance abuse. Negative for depression and memory loss. The patient is not nervous/anxious.    Allergic/Immunologic: Negative for persistent infections.       Objective:   Body mass index is 27.72 kg/m².  There were no vitals filed for this visit.             General    Constitutional: She is oriented to person, place, and time. She appears well-developed and well-nourished. No distress.   HENT:   Head: Normocephalic.   Eyes: EOM are normal.   Pulmonary/Chest: Effort normal.   Neurological: She is oriented to person, place, and time.   Psychiatric: She has a normal mood and affect.         Left Hand/Wrist Exam     Inspection   Scars: Wrist - present Hand -  present  Effusion: Wrist - absent Hand -  absent    Pain   Wrist - The patient exhibits pain of the flexor/pronator group and ulnar nerve.    Tests   Cubital Tunnel Compression Test: positive      Other     Sensory Exam  Median Distribution: abnormal  Ulnar Distribution: normal    Comments:  The patient has a well-healed carpal tunnel scar.  She has tenderness about the ulnar nerve left elbow.  There is paracervical tenderness as  well.  She has radicular symptoms into the long ring and small finger.      Left Elbow Exam     Tests   Tinel's sign (cubital tunnel): positive        Vascular Exam       Capillary Refill  Left Hand: normal capillary refill          Relevant imaging results reviewed and interpreted by me, discussed with the patient and / or family today radiographs were not obtained today  Assessment:     Left cubital tunnel syndrome   Status post bilateral carpal tunnel release and right cubital tunnel release   Cervical radiculopathy     Plan:     The patient is sent for repeat nerve conduction studies and will return with that study                Disclaimer: This note was prepared using a voice recognition system and is likely to have sound alike errors within the text.

## 2023-09-12 ENCOUNTER — TELEPHONE (OUTPATIENT)
Dept: PHYSICAL MEDICINE AND REHAB | Facility: CLINIC | Age: 68
End: 2023-09-12
Payer: MEDICARE

## 2023-09-12 ENCOUNTER — TELEPHONE (OUTPATIENT)
Dept: ORTHOPEDICS | Facility: CLINIC | Age: 68
End: 2023-09-12
Payer: MEDICARE

## 2023-09-12 NOTE — TELEPHONE ENCOUNTER
Spoke to the patient an let her know that she can call her insurance an find out who is in network with her insurance an [facility] then bring the results to us the patient verbalized understanding           ----- Message from Zoie Ricci sent at 9/12/2023  9:20 AM CDT -----  Contact: self [phone]  Patient called in stating that her appointment to have the nerve test done with Dr. Drummond scheduled for 09/13 had to be cancelled because the provider is out of her network. She would like a call back to let her know what she should do. Please call back [phone]. Thanks lizzie

## 2023-09-12 NOTE — TELEPHONE ENCOUNTER
----- Message from Therese Ricci sent at 9/12/2023  8:52 AM CDT -----  Name of Who is Calling:GRISELDA MATHEW [[id number]]           What is the request in detail: Pt was called and told her appt was not approved prior number is [phone]           Can the clinic reply by [facility]:           What Number to Call Back if not [facility]: [phone]

## 2023-09-13 ENCOUNTER — TELEPHONE (OUTPATIENT)
Dept: ORTHOPEDICS | Facility: CLINIC | Age: 68
End: 2023-09-13
Payer: MEDICARE

## 2023-09-13 NOTE — TELEPHONE ENCOUNTER
----- Message from Jagruti Tovar sent at 9/13/2023 10:42 AM CDT -----  Contact: Nichelle/ [facility]  Nichelle is calling in regards to having pt nerve test referral fax to Dr Jeff Blount at the [facility]  fax number [phone]  please call back at [phone]        Thanks  [facility]

## 2023-09-18 ENCOUNTER — TELEPHONE (OUTPATIENT)
Dept: ORTHOPEDICS | Facility: CLINIC | Age: 68
End: 2023-09-18
Payer: MEDICARE

## 2023-09-18 NOTE — TELEPHONE ENCOUNTER
Spoke to the patient an let her know that we faxed off her referral on 9/13/2023 an also gave her the phone number to the [facility] as well the patient verbalized understanding       ----- Message from Poonam King sent at 9/18/2023  2:50 PM CDT -----  Patient is requesting the nurse give her a call back at [phone]

## 2023-09-21 ENCOUNTER — TELEPHONE (OUTPATIENT)
Dept: ORTHOPEDICS | Facility: CLINIC | Age: 68
End: 2023-09-21
Payer: MEDICARE

## 2023-09-21 NOTE — TELEPHONE ENCOUNTER
Returned the patient's phone call. Patient states that they are schedule with Dr. Mark Blount for their emg. Patient is schedule for a follow-up with Dr. Meyer on 09/26/23 at 11:45. Emg order has been faxed to Dr. Mark Blount's office. Informed the patient that if that appointment is canceled to please give the office a call.           ----- Message from Nury Abreu sent at 9/21/2023  8:48 AM CDT -----  Pt wanted to advise she has the appointment with Dr.Brian Blount at 10:30. Please call back at [phone].Thanks

## 2023-09-26 ENCOUNTER — OFFICE VISIT (OUTPATIENT)
Dept: ORTHOPEDICS | Facility: CLINIC | Age: 68
End: 2023-09-26
Payer: MEDICARE

## 2023-09-26 VITALS — WEIGHT: 177 LBS | HEIGHT: 67 IN | BODY MASS INDEX: 27.78 KG/M2

## 2023-09-26 DIAGNOSIS — G56.20 CUBITAL TUNNEL SYNDROME, UNSPECIFIED LATERALITY: Primary | ICD-10-CM

## 2023-09-26 DIAGNOSIS — Z01.818 PREOP TESTING: ICD-10-CM

## 2023-09-26 DIAGNOSIS — G56.03 CARPAL TUNNEL SYNDROME ON BOTH SIDES: ICD-10-CM

## 2023-09-26 PROCEDURE — 3288F PR FALLS RISK ASSESSMENT DOCUMENTED: ICD-10-PCS | Mod: CPTII,S$GLB,, | Performed by: ORTHOPAEDIC SURGERY

## 2023-09-26 PROCEDURE — 3008F PR BODY MASS INDEX (BMI) DOCUMENTED: ICD-10-PCS | Mod: CPTII,S$GLB,, | Performed by: ORTHOPAEDIC SURGERY

## 2023-09-26 PROCEDURE — 1160F RVW MEDS BY RX/DR IN RCRD: CPT | Mod: CPTII,S$GLB,, | Performed by: ORTHOPAEDIC SURGERY

## 2023-09-26 PROCEDURE — 1125F PR PAIN SEVERITY QUANTIFIED, PAIN PRESENT: ICD-10-PCS | Mod: CPTII,S$GLB,, | Performed by: ORTHOPAEDIC SURGERY

## 2023-09-26 PROCEDURE — 1125F AMNT PAIN NOTED PAIN PRSNT: CPT | Mod: CPTII,S$GLB,, | Performed by: ORTHOPAEDIC SURGERY

## 2023-09-26 PROCEDURE — 99999 PR PBB SHADOW E&M-EST. PATIENT-LVL III: CPT | Mod: PBBFAC,,, | Performed by: ORTHOPAEDIC SURGERY

## 2023-09-26 PROCEDURE — 99213 OFFICE O/P EST LOW 20 MIN: CPT | Mod: PBBFAC | Performed by: ORTHOPAEDIC SURGERY

## 2023-09-26 PROCEDURE — 1101F PR PT FALLS ASSESS DOC 0-1 FALLS W/OUT INJ PAST YR: ICD-10-PCS | Mod: CPTII,S$GLB,, | Performed by: ORTHOPAEDIC SURGERY

## 2023-09-26 PROCEDURE — 99214 PR OFFICE/OUTPT VISIT, EST, LEVL IV, 30-39 MIN: ICD-10-PCS | Mod: S$GLB,ICN,, | Performed by: ORTHOPAEDIC SURGERY

## 2023-09-26 PROCEDURE — 3008F BODY MASS INDEX DOCD: CPT | Mod: CPTII,S$GLB,, | Performed by: ORTHOPAEDIC SURGERY

## 2023-09-26 PROCEDURE — 99999 PR PBB SHADOW E&M-EST. PATIENT-LVL III: ICD-10-PCS | Mod: PBBFAC,,, | Performed by: ORTHOPAEDIC SURGERY

## 2023-09-26 PROCEDURE — 3288F FALL RISK ASSESSMENT DOCD: CPT | Mod: CPTII,S$GLB,, | Performed by: ORTHOPAEDIC SURGERY

## 2023-09-26 PROCEDURE — 1160F PR REVIEW ALL MEDS BY PRESCRIBER/CLIN PHARMACIST DOCUMENTED: ICD-10-PCS | Mod: CPTII,S$GLB,, | Performed by: ORTHOPAEDIC SURGERY

## 2023-09-26 PROCEDURE — 1159F PR MEDICATION LIST DOCUMENTED IN MEDICAL RECORD: ICD-10-PCS | Mod: CPTII,S$GLB,, | Performed by: ORTHOPAEDIC SURGERY

## 2023-09-26 PROCEDURE — 99214 OFFICE O/P EST MOD 30 MIN: CPT | Mod: S$GLB,ICN,, | Performed by: ORTHOPAEDIC SURGERY

## 2023-09-26 PROCEDURE — 1159F MED LIST DOCD IN RCRD: CPT | Mod: CPTII,S$GLB,, | Performed by: ORTHOPAEDIC SURGERY

## 2023-09-26 PROCEDURE — 1101F PT FALLS ASSESS-DOCD LE1/YR: CPT | Mod: CPTII,S$GLB,, | Performed by: ORTHOPAEDIC SURGERY

## 2023-09-26 NOTE — PROGRESS NOTES
Subjective:     Patient ID: Alea Terrazas is a 67 y.o. female.    Chief Complaint: Pain and Numbness of the Right Hand and Pain and Numbness of the Left Hand      HPI:  The patient is a 67-year-old female with bilateral carpal tunnel syndrome and bilateral cubital tunnel syndrome documented by nerve conduction studies who requests a left cubital tunnel release with possible anterior transposition ulnar nerve and left carpal tunnel release.    Past Medical History:   Diagnosis Date    Anxiety     Arthritis     Carpal tunnel syndrome     Chronic neck and back pain     Hypertension      Past Surgical History:   Procedure Laterality Date    CARPAL TUNNEL RELEASE Left     CARPAL TUNNEL RELEASE Right 9/5/2019    Procedure: RELEASE, CARPAL TUNNEL;  Surgeon: Billy Meyer MD;  Location: [facility] OR;  Service: Orthopedics;  Laterality: Right;  Confirmed anesthesia type with CRNA.    CERVICAL FUSION  2016, 2017    ROTATOR CUFF REPAIR Right 2015    TRIGGER FINGER RELEASE Right 9/5/2019    Procedure: RELEASE, TRIGGER FINGER;  Surgeon: Billy Meyer MD;  Location: [facility] OR;  Service: Orthopedics;  Laterality: Right;  right thumb   Confirmed anesthesia type with CRNA.    ULNAR TUNNEL RELEASE Right 10/1/2020    Procedure: RELEASE, CUBITAL TUNNEL;  Surgeon: Billy Meyer MD;  Location: [facility] OR;  Service: Orthopedics;  Laterality: Right;  lower arm (near elbow)  Per MARK Kent CRNA, general anesthesia used.  Per surgeon, anterior transposition ulnar nerve was NOT peformed.       Family History   Problem Relation Age of Onset    Cancer Father     Diabetes Father     Heart failure Father     COPD Mother 41    Kidney failure Mother     No Known Problems Brother     No Known Problems Brother      Social History     Socioeconomic History    Marital status:    Tobacco Use    Smoking status: Some Days     Current packs/day: 0.50     Average packs/day: 0.5 packs/day for 41.0 years (20.5 ttl pk-yrs)     Types:  Cigarettes    Smokeless tobacco: Never   Substance and Sexual Activity    Alcohol use: Yes     Comment: occasionally    Drug use: No     Medication List with Changes/Refills   Current Medications    ALBUTEROL (PROVENTIL/VENTOLIN HFA) 90 MCG/ACTUATION INHALER    INHALE 1 TO 2 PUFFS BY MOUTH INTO THE LUNGS EVERY 4-6 HOURS AS NEEDED    AQUANAZ 10- MG TAB    Take 1 tablet by mouth every 6 (six) hours as needed. Hold 3 days prior to surgery    CELECOXIB (CELEBREX) 200 MG CAPSULE    Take 200 mg by mouth.    CETIRIZINE (ZYRTEC) 10 MG TABLET    Take 10 mg by mouth once daily.     CHOLECALCIFEROL, VITAMIN D3, 1,250 MCG (50,000 UNIT) CAPSULE    Take 50,000 Units by mouth every 7 days.    CYCLOBENZAPRINE (FLEXERIL) 10 MG TABLET    Take 10 mg by mouth 3 (three) times daily as needed.     ERGOCALCIFEROL (ERGOCALCIFEROL) 50,000 UNIT CAP    Take 50,000 Units by mouth every 7 days.     FLUTICASONE (FLONASE) 50 MCG/ACTUATION NASAL SPRAY    2 sprays by Each Nare route once daily.    GABAPENTIN (NEURONTIN) 300 MG CAPSULE    Take 300 mg by mouth nightly as needed.     GABAPENTIN ENACARBIL 600 MG TBSR    Take 600 mg by mouth.    HYDROCHLOROTHIAZIDE (HYDRODIURIL) 25 MG TABLET    Take 25 mg by mouth once daily.    LISINOPRIL-HYDROCHLOROTHIAZIDE (PRINZIDE,ZESTORETIC) 20-12.5 MG PER TABLET    Take by mouth.    MELOXICAM (MOBIC) 15 MG TABLET    Take 15 mg by mouth once daily. Hold 4 days prior to surgery    OXYCODONE-ACETAMINOPHEN (PERCOCET) 5-325 MG PER TABLET    Take 1 tablet by mouth 2 (two) times daily.     TRIAMTERENE-HYDROCHLOROTHIAZIDE 37.5-25 MG (DYAZIDE) 37.5-25 MG PER CAPSULE    Dyazide Take capsule 1 time per day No date recorded capsule 1 time per day No route recorded No set duration recorded No set duration amount recorded suspended 37.5-25 mg     Review of patient's allergies indicates:   Allergen Reactions    Codeine      Review of Systems   Constitutional: Negative for malaise/fatigue.   HENT:  Negative for hearing  loss.    Eyes:  Negative for double vision and visual disturbance.   Cardiovascular:  Negative for chest pain.   Respiratory:  Negative for shortness of breath.    Endocrine: Negative for cold intolerance.   Hematologic/Lymphatic: Does not bruise/bleed easily.   Skin:  Negative for poor wound healing and suspicious lesions.   Musculoskeletal:  Positive for back pain and neck pain. Negative for gout, joint pain and joint swelling.   Gastrointestinal:  Negative for nausea and vomiting.   Genitourinary:  Negative for dysuria.   Neurological:  Positive for numbness, paresthesias and sensory change.   Psychiatric/Behavioral:  Negative for depression, memory loss and substance abuse. The patient is not nervous/anxious.    Allergic/Immunologic: Negative for persistent infections.       Objective:   Body mass index is 27.72 kg/m².  There were no vitals filed for this visit.             General    Constitutional: She is oriented to person, place, and time. She appears well-developed and well-nourished. No distress.   HENT:   Head: Normocephalic.   Mouth/Throat: Oropharynx is clear and moist.   Eyes: EOM are normal.   Cardiovascular:  Normal rate.            Pulmonary/Chest: Effort normal.   Abdominal: Soft.   Neurological: She is alert and oriented to person, place, and time. No cranial nerve deficit.   Psychiatric: She has a normal mood and affect.             Right Hand/Wrist Exam     Inspection   Scars: Wrist - absent Hand -  absent  Effusion: Wrist - absent Hand -  absent    Pain   Wrist - The patient exhibits pain of the flexor/pronator group and ulnar nerve.    Tests   Phalens sign: positive  Tinel's sign (median nerve): positive  Carpal Tunnel Compression Test: positive  Cubital Tunnel Compression Test: positive    Atrophy   Thenar:  negative  Intrinsic:  negative    Other     Neuorologic Exam    Median Distribution: abnormal  Ulnar Distribution: normal  Radial Distribution: normal    Comments:  The patient has a  positive Tinel and positive Phalen sign.  There is no thenar atrophy noted.  She has a positive Tinel sign over the ulnar nerve right elbow with no intrinsic atrophy noted.      Left Hand/Wrist Exam     Inspection   Scars: Wrist - absent Hand -  absent  Effusion: Wrist - absent Hand -  absent    Pain   Wrist - The patient exhibits pain of the flexor/pronator group and ulnar nerve.    Tests   Phalens sign: positive  Tinel's sign (median nerve): positive  Carpal Tunnel Compression Test: positive  Cubital Tunnel Compression Test: positive    Atrophy  Thenar:  Negative  Intrinsic: negative    Other     Sensory Exam  Median Distribution: abnormal  Ulnar Distribution: normal  Radial Distribution: normal    Comments:  The patient has a positive Tinel and positive Phalen sign.  There is no thenar atrophy noted.  She has a positive Tinel over the ulnar nerve left elbow with no intrinsic atrophy noted.      Right Elbow Exam     Tests   Tinel's sign (cubital tunnel): positive      Left Elbow Exam     Tests   Tinel's sign (cubital tunnel): positive        Vascular Exam       Capillary Refill  Right Hand: normal capillary refill  Left Hand: normal capillary refill          radiographs were not obtained today  Assessment:     Encounter Diagnoses   Name Primary?    Cubital tunnel syndrome, unspecified laterality Yes    Carpal tunnel syndrome on both sides         Plan:       The patient wishes to have a left carpal tunnel release and left cubital tunnel release with possible anterior transposition ulnar nerve.  She was counseled regarding the surgery.  Risk complications and alternatives were discussed including the risk of infection, anesthetic risk, injury to nerves and vessels, loss of motion, and possible need for additional surgeries were discussed.  She seems to understand and agree that surgery.  All questions were answered.              Disclaimer: This note was prepared using a voice recognition system and is likely to  have sound alike errors within the text.

## 2023-10-10 ENCOUNTER — TELEPHONE (OUTPATIENT)
Dept: ORTHOPEDICS | Facility: CLINIC | Age: 68
End: 2023-10-10

## 2023-10-10 NOTE — TELEPHONE ENCOUNTER
Attempted to call this patient back there was no answer [facility]       ----- Message from Acacia Ahmadi sent at 10/10/2023 11:44 AM CDT -----  Contact: Pt [phone]  Would like to receive medical advice.    Would they like a call back or a response via [facility]:  call back    Additional information:  Alea is calling to speak to the provider or nurse. Pt states she would like the nurse to call her back to discuss some things about the appt and questions she has. Please call Alea back for advice.

## 2023-10-11 NOTE — H&P
Subjective:     Patient ID: Alea Terrazas is a 67 y.o. female.    Chief Complaint: Pain and Numbness of the Right Hand and Pain and Numbness of the Left Hand      HPI:  The patient is a 67-year-old female with bilateral carpal tunnel syndrome and bilateral cubital tunnel syndrome documented by nerve conduction studies who requests a left cubital tunnel release with possible anterior transposition ulnar nerve and left carpal tunnel release.    Past Medical History:   Diagnosis Date    Anxiety     Arthritis     Carpal tunnel syndrome     Chronic neck and back pain     Hypertension      Past Surgical History:   Procedure Laterality Date    CARPAL TUNNEL RELEASE Left     CARPAL TUNNEL RELEASE Right 9/5/2019    Procedure: RELEASE, CARPAL TUNNEL;  Surgeon: Billy Meyer MD;  Location: [facility] OR;  Service: Orthopedics;  Laterality: Right;  Confirmed anesthesia type with CRNA.    CERVICAL FUSION  2016, 2017    ROTATOR CUFF REPAIR Right 2015    TRIGGER FINGER RELEASE Right 9/5/2019    Procedure: RELEASE, TRIGGER FINGER;  Surgeon: Billy Meyer MD;  Location: [facility] OR;  Service: Orthopedics;  Laterality: Right;  right thumb   Confirmed anesthesia type with CRNA.    ULNAR TUNNEL RELEASE Right 10/1/2020    Procedure: RELEASE, CUBITAL TUNNEL;  Surgeon: Billy Meyer MD;  Location: [facility] OR;  Service: Orthopedics;  Laterality: Right;  lower arm (near elbow)  Per MARK Kent CRNA, general anesthesia used.  Per surgeon, anterior transposition ulnar nerve was NOT peformed.       Family History   Problem Relation Age of Onset    Cancer Father     Diabetes Father     Heart failure Father     COPD Mother 41    Kidney failure Mother     No Known Problems Brother     No Known Problems Brother      Social History     Socioeconomic History    Marital status:    Tobacco Use    Smoking status: Some Days     Current packs/day: 0.50     Average packs/day: 0.5 packs/day for 41.0 years (20.5 ttl pk-yrs)     Types:  Cigarettes    Smokeless tobacco: Never   Substance and Sexual Activity    Alcohol use: Yes     Comment: occasionally    Drug use: No     Medication List with Changes/Refills   Current Medications    ALBUTEROL (PROVENTIL/VENTOLIN HFA) 90 MCG/ACTUATION INHALER    INHALE 1 TO 2 PUFFS BY MOUTH INTO THE LUNGS EVERY 4-6 HOURS AS NEEDED    AQUANAZ 10- MG TAB    Take 1 tablet by mouth every 6 (six) hours as needed. Hold 3 days prior to surgery    CELECOXIB (CELEBREX) 200 MG CAPSULE    Take 200 mg by mouth.    CETIRIZINE (ZYRTEC) 10 MG TABLET    Take 10 mg by mouth once daily.     CHOLECALCIFEROL, VITAMIN D3, 1,250 MCG (50,000 UNIT) CAPSULE    Take 50,000 Units by mouth every 7 days.    CYCLOBENZAPRINE (FLEXERIL) 10 MG TABLET    Take 10 mg by mouth 3 (three) times daily as needed.     ERGOCALCIFEROL (ERGOCALCIFEROL) 50,000 UNIT CAP    Take 50,000 Units by mouth every 7 days.     FLUTICASONE (FLONASE) 50 MCG/ACTUATION NASAL SPRAY    2 sprays by Each Nare route once daily.    GABAPENTIN (NEURONTIN) 300 MG CAPSULE    Take 300 mg by mouth nightly as needed.     GABAPENTIN ENACARBIL 600 MG TBSR    Take 600 mg by mouth.    HYDROCHLOROTHIAZIDE (HYDRODIURIL) 25 MG TABLET    Take 25 mg by mouth once daily.    LISINOPRIL-HYDROCHLOROTHIAZIDE (PRINZIDE,ZESTORETIC) 20-12.5 MG PER TABLET    Take by mouth.    MELOXICAM (MOBIC) 15 MG TABLET    Take 15 mg by mouth once daily. Hold 4 days prior to surgery    OXYCODONE-ACETAMINOPHEN (PERCOCET) 5-325 MG PER TABLET    Take 1 tablet by mouth 2 (two) times daily.     TRIAMTERENE-HYDROCHLOROTHIAZIDE 37.5-25 MG (DYAZIDE) 37.5-25 MG PER CAPSULE    Dyazide Take capsule 1 time per day No date recorded capsule 1 time per day No route recorded No set duration recorded No set duration amount recorded suspended 37.5-25 mg     Review of patient's allergies indicates:   Allergen Reactions    Codeine      Review of Systems   Constitutional: Negative for malaise/fatigue.   HENT:  Negative for hearing  loss.    Eyes:  Negative for double vision and visual disturbance.   Cardiovascular:  Negative for chest pain.   Respiratory:  Negative for shortness of breath.    Endocrine: Negative for cold intolerance.   Hematologic/Lymphatic: Does not bruise/bleed easily.   Skin:  Negative for poor wound healing and suspicious lesions.   Musculoskeletal:  Positive for back pain and neck pain. Negative for gout, joint pain and joint swelling.   Gastrointestinal:  Negative for nausea and vomiting.   Genitourinary:  Negative for dysuria.   Neurological:  Positive for numbness, paresthesias and sensory change.   Psychiatric/Behavioral:  Negative for depression, memory loss and substance abuse. The patient is not nervous/anxious.    Allergic/Immunologic: Negative for persistent infections.       Objective:   Body mass index is 27.72 kg/m².  There were no vitals filed for this visit.             General    Constitutional: She is oriented to person, place, and time. She appears well-developed and well-nourished. No distress.   HENT:   Head: Normocephalic.   Mouth/Throat: Oropharynx is clear and moist.   Eyes: EOM are normal.   Cardiovascular:  Normal rate.            Pulmonary/Chest: Effort normal.   Abdominal: Soft.   Neurological: She is alert and oriented to person, place, and time. No cranial nerve deficit.   Psychiatric: She has a normal mood and affect.             Right Hand/Wrist Exam     Inspection   Scars: Wrist - absent Hand -  absent  Effusion: Wrist - absent Hand -  absent    Pain   Wrist - The patient exhibits pain of the flexor/pronator group and ulnar nerve.    Tests   Phalens sign: positive  Tinel's sign (median nerve): positive  Carpal Tunnel Compression Test: positive  Cubital Tunnel Compression Test: positive    Atrophy   Thenar:  negative  Intrinsic:  negative    Other     Neuorologic Exam    Median Distribution: abnormal  Ulnar Distribution: normal  Radial Distribution: normal    Comments:  The patient has a  positive Tinel and positive Phalen sign.  There is no thenar atrophy noted.  She has a positive Tinel sign over the ulnar nerve right elbow with no intrinsic atrophy noted.      Left Hand/Wrist Exam     Inspection   Scars: Wrist - absent Hand -  absent  Effusion: Wrist - absent Hand -  absent    Pain   Wrist - The patient exhibits pain of the flexor/pronator group and ulnar nerve.    Tests   Phalens sign: positive  Tinel's sign (median nerve): positive  Carpal Tunnel Compression Test: positive  Cubital Tunnel Compression Test: positive    Atrophy  Thenar:  Negative  Intrinsic: negative    Other     Sensory Exam  Median Distribution: abnormal  Ulnar Distribution: normal  Radial Distribution: normal    Comments:  The patient has a positive Tinel and positive Phalen sign.  There is no thenar atrophy noted.  She has a positive Tinel over the ulnar nerve left elbow with no intrinsic atrophy noted.      Right Elbow Exam     Tests   Tinel's sign (cubital tunnel): positive      Left Elbow Exam     Tests   Tinel's sign (cubital tunnel): positive        Vascular Exam       Capillary Refill  Right Hand: normal capillary refill  Left Hand: normal capillary refill          radiographs were not obtained today  Assessment:     Encounter Diagnoses   Name Primary?    Cubital tunnel syndrome, unspecified laterality Yes    Carpal tunnel syndrome on both sides         Plan:       The patient wishes to have a left carpal tunnel release and left cubital tunnel release with possible anterior transposition ulnar nerve.  She was counseled regarding the surgery.  Risk complications and alternatives were discussed including the risk of infection, anesthetic risk, injury to nerves and vessels, loss of motion, and possible need for additional surgeries were discussed.  She seems to understand and agree that surgery.  All questions were answered.              Disclaimer: This note was prepared using a voice recognition system and is likely to  have sound alike errors within the text.

## 2023-10-16 ENCOUNTER — PATIENT MESSAGE (OUTPATIENT)
Dept: PREADMISSION TESTING | Facility: HOSPITAL | Age: 68
End: 2023-10-16
Payer: MEDICARE

## 2023-10-16 RX ORDER — PANTOPRAZOLE SODIUM 40 MG/1
40 TABLET, DELAYED RELEASE ORAL 2 TIMES DAILY
COMMUNITY
Start: 2023-10-05

## 2023-10-18 ENCOUNTER — HOSPITAL ENCOUNTER (OUTPATIENT)
Dept: RADIOLOGY | Facility: HOSPITAL | Age: 68
Discharge: HOME | End: 2023-10-18
Attending: ORTHOPAEDIC SURGERY
Payer: MEDICARE

## 2023-10-18 ENCOUNTER — HOSPITAL ENCOUNTER (OUTPATIENT)
Dept: CARDIOLOGY | Facility: HOSPITAL | Age: 68
Discharge: HOME | End: 2023-10-18
Attending: ORTHOPAEDIC SURGERY
Payer: MEDICARE

## 2023-10-18 DIAGNOSIS — Z01.818 PREOP TESTING: ICD-10-CM

## 2023-10-18 PROCEDURE — 71046 XR CHEST PA AND LATERAL PRE-OP: ICD-10-PCS | Mod: 26,,, | Performed by: RADIOLOGY

## 2023-10-18 PROCEDURE — 71046 X-RAY EXAM CHEST 2 VIEWS: CPT | Mod: 26,,, | Performed by: RADIOLOGY

## 2023-10-18 PROCEDURE — 93010 EKG 12-LEAD: ICD-10-PCS | Mod: ,,, | Performed by: INTERNAL MEDICINE

## 2023-10-18 PROCEDURE — 93005 ELECTROCARDIOGRAM TRACING: CPT

## 2023-10-18 PROCEDURE — 93010 ELECTROCARDIOGRAM REPORT: CPT | Mod: ,,, | Performed by: INTERNAL MEDICINE

## 2023-10-18 PROCEDURE — 71046 X-RAY EXAM CHEST 2 VIEWS: CPT | Mod: TC

## 2023-11-01 ENCOUNTER — ANESTHESIA EVENT (OUTPATIENT)
Dept: SURGERY | Facility: HOSPITAL | Age: 68
End: 2023-11-01
Payer: MEDICARE

## 2023-11-02 ENCOUNTER — HOSPITAL ENCOUNTER (OUTPATIENT)
Facility: HOSPITAL | Age: 68
Discharge: HOME | End: 2023-11-02
Attending: ORTHOPAEDIC SURGERY | Admitting: ORTHOPAEDIC SURGERY
Payer: MEDICARE

## 2023-11-02 ENCOUNTER — ANESTHESIA (OUTPATIENT)
Dept: SURGERY | Facility: HOSPITAL | Age: 68
End: 2023-11-02
Payer: MEDICARE

## 2023-11-02 VITALS
SYSTOLIC BLOOD PRESSURE: 138 MMHG | RESPIRATION RATE: 16 BRPM | OXYGEN SATURATION: 98 % | WEIGHT: 165.13 LBS | DIASTOLIC BLOOD PRESSURE: 84 MMHG | TEMPERATURE: 99 F | HEART RATE: 84 BPM | BODY MASS INDEX: 25.92 KG/M2 | HEIGHT: 67 IN

## 2023-11-02 DIAGNOSIS — G56.02 LEFT CARPAL TUNNEL SYNDROME: Primary | ICD-10-CM

## 2023-11-02 DIAGNOSIS — G56.02 CARPAL TUNNEL SYNDROME OF LEFT WRIST: ICD-10-CM

## 2023-11-02 DIAGNOSIS — G56.21 CUBITAL TUNNEL SYNDROME ON RIGHT: ICD-10-CM

## 2023-11-02 PROCEDURE — 63600175 PHARM REV CODE 636 W HCPCS: Performed by: ANESTHESIOLOGY

## 2023-11-02 PROCEDURE — D9220A PRA ANESTHESIA: Mod: ,,, | Performed by: NURSE ANESTHETIST, CERTIFIED REGISTERED

## 2023-11-02 PROCEDURE — 36000706: Performed by: ORTHOPAEDIC SURGERY

## 2023-11-02 PROCEDURE — 64415 NJX AA&/STRD BRCH PLXS IMG: CPT | Performed by: ANESTHESIOLOGY

## 2023-11-02 PROCEDURE — 71000015 HC POSTOP RECOV 1ST HR: Performed by: ORTHOPAEDIC SURGERY

## 2023-11-02 PROCEDURE — 25000003 PHARM REV CODE 250: Performed by: ANESTHESIOLOGY

## 2023-11-02 PROCEDURE — 27200651 HC AIRWAY, LMA: Performed by: ANESTHESIOLOGY

## 2023-11-02 PROCEDURE — 37000009 HC ANESTHESIA EA ADD 15 MINS: Performed by: ORTHOPAEDIC SURGERY

## 2023-11-02 PROCEDURE — 64718 PR REVISE ULNAR NERVE AT ELBOW: ICD-10-PCS | Mod: LT,,, | Performed by: ORTHOPAEDIC SURGERY

## 2023-11-02 PROCEDURE — 36000707: Performed by: ORTHOPAEDIC SURGERY

## 2023-11-02 PROCEDURE — 71000033 HC RECOVERY, INTIAL HOUR: Performed by: ORTHOPAEDIC SURGERY

## 2023-11-02 PROCEDURE — 63600175 PHARM REV CODE 636 W HCPCS: Performed by: NURSE ANESTHETIST, CERTIFIED REGISTERED

## 2023-11-02 PROCEDURE — 25000003 PHARM REV CODE 250: Performed by: NURSE ANESTHETIST, CERTIFIED REGISTERED

## 2023-11-02 PROCEDURE — 64450 NJX AA&/STRD OTHER PN/BRANCH: CPT | Performed by: ORTHOPAEDIC SURGERY

## 2023-11-02 PROCEDURE — 64721 PR REVISE MEDIAN N/CARPAL TUNNEL SURG: ICD-10-PCS | Mod: 51,LT,, | Performed by: ORTHOPAEDIC SURGERY

## 2023-11-02 PROCEDURE — 63600175 PHARM REV CODE 636 W HCPCS

## 2023-11-02 PROCEDURE — 64718 REVISE ULNAR NERVE AT ELBOW: CPT | Mod: LT,,, | Performed by: ORTHOPAEDIC SURGERY

## 2023-11-02 PROCEDURE — 64721 CARPAL TUNNEL SURGERY: CPT | Mod: 51,LT,, | Performed by: ORTHOPAEDIC SURGERY

## 2023-11-02 PROCEDURE — 37000008 HC ANESTHESIA 1ST 15 MINUTES: Performed by: ORTHOPAEDIC SURGERY

## 2023-11-02 PROCEDURE — 25000242 PHARM REV CODE 250 ALT 637 W/ HCPCS: Performed by: NURSE ANESTHETIST, CERTIFIED REGISTERED

## 2023-11-02 PROCEDURE — D9220A PRA ANESTHESIA: ICD-10-PCS | Mod: ,,, | Performed by: NURSE ANESTHETIST, CERTIFIED REGISTERED

## 2023-11-02 RX ORDER — DEXAMETHASONE SODIUM PHOSPHATE 4 MG/ML
INJECTION, SOLUTION INTRA-ARTICULAR; INTRALESIONAL; INTRAMUSCULAR; INTRAVENOUS; SOFT TISSUE
Status: DISCONTINUED | OUTPATIENT
Start: 2023-11-02 | End: 2023-11-02

## 2023-11-02 RX ORDER — FENTANYL CITRATE 50 UG/ML
25 INJECTION, SOLUTION INTRAMUSCULAR; INTRAVENOUS EVERY 5 MIN PRN
Status: COMPLETED | OUTPATIENT
Start: 2023-11-02 | End: 2023-11-02

## 2023-11-02 RX ORDER — LIDOCAINE HYDROCHLORIDE 10 MG/ML
INJECTION INFILTRATION; PERINEURAL
Status: DISCONTINUED | OUTPATIENT
Start: 2023-11-02 | End: 2023-11-02

## 2023-11-02 RX ORDER — ONDANSETRON 2 MG/ML
INJECTION INTRAMUSCULAR; INTRAVENOUS
Status: DISCONTINUED | OUTPATIENT
Start: 2023-11-02 | End: 2023-11-02

## 2023-11-02 RX ORDER — LIDOCAINE HYDROCHLORIDE 20 MG/ML
INJECTION, SOLUTION INFILTRATION; PERINEURAL
Status: DISCONTINUED
Start: 2023-11-02 | End: 2023-11-02 | Stop reason: WASHOUT

## 2023-11-02 RX ORDER — SODIUM CHLORIDE 0.9 % (FLUSH) 0.9 %
10 SYRINGE (ML) INJECTION
Status: DISCONTINUED | OUTPATIENT
Start: 2023-11-02 | End: 2023-11-02 | Stop reason: HOSPADM

## 2023-11-02 RX ORDER — OXYCODONE AND ACETAMINOPHEN 10; 325 MG/1; MG/1
1 TABLET ORAL EVERY 6 HOURS PRN
Qty: 28 TABLET | Refills: 0 | Status: SHIPPED | OUTPATIENT
Start: 2023-11-02

## 2023-11-02 RX ORDER — OXYCODONE HYDROCHLORIDE 5 MG/1
5 TABLET ORAL ONCE
Status: COMPLETED | OUTPATIENT
Start: 2023-11-02 | End: 2023-11-02

## 2023-11-02 RX ORDER — ALBUTEROL SULFATE 90 UG/1
AEROSOL, METERED RESPIRATORY (INHALATION)
Status: DISCONTINUED | OUTPATIENT
Start: 2023-11-02 | End: 2023-11-02

## 2023-11-02 RX ORDER — MEPERIDINE HYDROCHLORIDE 25 MG/ML
12.5 INJECTION INTRAMUSCULAR; INTRAVENOUS; SUBCUTANEOUS ONCE AS NEEDED
Status: DISCONTINUED | OUTPATIENT
Start: 2023-11-02 | End: 2023-11-02 | Stop reason: HOSPADM

## 2023-11-02 RX ORDER — OXYCODONE HYDROCHLORIDE 5 MG/1
10 TABLET ORAL EVERY 4 HOURS PRN
Status: DISCONTINUED | OUTPATIENT
Start: 2023-11-02 | End: 2023-11-02

## 2023-11-02 RX ORDER — MIDAZOLAM HYDROCHLORIDE 1 MG/ML
INJECTION INTRAMUSCULAR; INTRAVENOUS
Status: DISCONTINUED | OUTPATIENT
Start: 2023-11-02 | End: 2023-11-02

## 2023-11-02 RX ORDER — CHLORHEXIDINE GLUCONATE ORAL RINSE 1.2 MG/ML
10 SOLUTION DENTAL
Status: ACTIVE | OUTPATIENT
Start: 2023-11-02

## 2023-11-02 RX ORDER — PROPOFOL 10 MG/ML
VIAL (ML) INTRAVENOUS
Status: DISCONTINUED | OUTPATIENT
Start: 2023-11-02 | End: 2023-11-02

## 2023-11-02 RX ORDER — FENTANYL CITRATE 50 UG/ML
INJECTION, SOLUTION INTRAMUSCULAR; INTRAVENOUS
Status: DISCONTINUED | OUTPATIENT
Start: 2023-11-02 | End: 2023-11-02

## 2023-11-02 RX ORDER — ACETAMINOPHEN 10 MG/ML
1000 INJECTION, SOLUTION INTRAVENOUS ONCE
Status: COMPLETED | OUTPATIENT
Start: 2023-11-02 | End: 2023-11-02

## 2023-11-02 RX ORDER — CEFAZOLIN SODIUM 2 G/50ML
2 SOLUTION INTRAVENOUS
Status: COMPLETED | OUTPATIENT
Start: 2023-11-02 | End: 2023-11-02

## 2023-11-02 RX ORDER — CHLORHEXIDINE GLUCONATE ORAL RINSE 1.2 MG/ML
10 SOLUTION DENTAL 2 TIMES DAILY
Status: DISCONTINUED | OUTPATIENT
Start: 2023-11-02 | End: 2023-11-02 | Stop reason: HOSPADM

## 2023-11-02 RX ORDER — SODIUM CHLORIDE, SODIUM LACTATE, POTASSIUM CHLORIDE, CALCIUM CHLORIDE 600; 310; 30; 20 MG/100ML; MG/100ML; MG/100ML; MG/100ML
INJECTION, SOLUTION INTRAVENOUS CONTINUOUS
Status: ACTIVE | OUTPATIENT
Start: 2023-11-02

## 2023-11-02 RX ORDER — LIDOCAINE HYDROCHLORIDE 20 MG/ML
INJECTION, SOLUTION EPIDURAL; INFILTRATION; INTRACAUDAL; PERINEURAL
Status: COMPLETED | OUTPATIENT
Start: 2023-11-02 | End: 2023-11-02

## 2023-11-02 RX ORDER — ROPIVACAINE HYDROCHLORIDE 5 MG/ML
INJECTION, SOLUTION EPIDURAL; INFILTRATION; PERINEURAL
Status: COMPLETED | OUTPATIENT
Start: 2023-11-02 | End: 2023-11-02

## 2023-11-02 RX ORDER — ONDANSETRON 2 MG/ML
4 INJECTION INTRAMUSCULAR; INTRAVENOUS DAILY PRN
Status: DISCONTINUED | OUTPATIENT
Start: 2023-11-02 | End: 2023-11-02 | Stop reason: HOSPADM

## 2023-11-02 RX ORDER — PHENYLEPHRINE HYDROCHLORIDE 10 MG/ML
INJECTION INTRAVENOUS
Status: DISCONTINUED | OUTPATIENT
Start: 2023-11-02 | End: 2023-11-02

## 2023-11-02 RX ADMIN — OXYCODONE HYDROCHLORIDE 5 MG: 5 TABLET ORAL at 08:11

## 2023-11-02 RX ADMIN — DEXAMETHASONE SODIUM PHOSPHATE 8 MG: 4 INJECTION, SOLUTION INTRA-ARTICULAR; INTRALESIONAL; INTRAMUSCULAR; INTRAVENOUS; SOFT TISSUE at 07:11

## 2023-11-02 RX ADMIN — LIDOCAINE HYDROCHLORIDE 100 MG: 10 INJECTION INFILTRATION; PERINEURAL at 07:11

## 2023-11-02 RX ADMIN — ONDANSETRON 4 MG: 2 INJECTION INTRAMUSCULAR; INTRAVENOUS at 07:11

## 2023-11-02 RX ADMIN — ALBUTEROL SULFATE 2 PUFF: 90 AEROSOL, METERED RESPIRATORY (INHALATION) at 06:11

## 2023-11-02 RX ADMIN — ACETAMINOPHEN 1000 MG: 10 INJECTION, SOLUTION INTRAVENOUS at 08:11

## 2023-11-02 RX ADMIN — MIDAZOLAM HYDROCHLORIDE 1 MG: 1 INJECTION INTRAMUSCULAR; INTRAVENOUS at 07:11

## 2023-11-02 RX ADMIN — FENTANYL CITRATE 50 MCG: 50 INJECTION, SOLUTION INTRAMUSCULAR; INTRAVENOUS at 07:11

## 2023-11-02 RX ADMIN — PHENYLEPHRINE HYDROCHLORIDE 100 MCG: 10 INJECTION INTRAVENOUS at 07:11

## 2023-11-02 RX ADMIN — LIDOCAINE HYDROCHLORIDE 3 ML: 20 INJECTION, SOLUTION EPIDURAL; INFILTRATION; INTRACAUDAL; PERINEURAL at 07:11

## 2023-11-02 RX ADMIN — SODIUM CHLORIDE, SODIUM LACTATE, POTASSIUM CHLORIDE, AND CALCIUM CHLORIDE: 600; 310; 30; 20 INJECTION, SOLUTION INTRAVENOUS at 07:11

## 2023-11-02 RX ADMIN — PROPOFOL 190 MG: 10 INJECTION, EMULSION INTRAVENOUS at 07:11

## 2023-11-02 RX ADMIN — SODIUM CHLORIDE, POTASSIUM CHLORIDE, SODIUM LACTATE AND CALCIUM CHLORIDE: 600; 310; 30; 20 INJECTION, SOLUTION INTRAVENOUS at 06:11

## 2023-11-02 RX ADMIN — CEFAZOLIN SODIUM 2 G: 2 SOLUTION INTRAVENOUS at 07:11

## 2023-11-02 RX ADMIN — GLYCOPYRROLATE 0.2 MG: 0.2 INJECTION, SOLUTION INTRAMUSCULAR; INTRAVITREAL at 07:11

## 2023-11-02 RX ADMIN — PHENYLEPHRINE HYDROCHLORIDE 150 MCG: 10 INJECTION INTRAVENOUS at 07:11

## 2023-11-02 RX ADMIN — PHENYLEPHRINE HYDROCHLORIDE 50 MCG: 10 INJECTION INTRAVENOUS at 07:11

## 2023-11-02 RX ADMIN — FENTANYL CITRATE 25 MCG: 50 INJECTION, SOLUTION INTRAMUSCULAR; INTRAVENOUS at 08:11

## 2023-11-02 RX ADMIN — ROPIVACAINE HYDROCHLORIDE 20 ML: 5 INJECTION, SOLUTION EPIDURAL; INFILTRATION; PERINEURAL at 07:11

## 2023-11-02 NOTE — PLAN OF CARE
Pt prepped for procedure, spouse at bedside visiting with pt. No complaints at this time will continue to monitor.

## 2023-11-02 NOTE — PLAN OF CARE
Left interscalene peripheral nerve block completed by Dr. Wil LEONE per procedure and documented per flowsheet, continuous cardiac monitoring in place.

## 2023-11-02 NOTE — OP NOTE
[facility]  Orthopedic Surgery  Operative Note    SUMMARY     Date of Procedure: 11/2/2023   Assistant: None    Procedure: Procedure(s) (LRB):  RELEASE, CARPAL TUNNEL (Left)  RELEASE, CUBITAL TUNNEL (Left)  TRANSPOSITION, NERVE, ULNAR (Left)   anterior subcutaneous    Surgeon(s) and Role:     * Billy Meyer MD - Primary    Assisting Surgeon: None    Pre-Operative Diagnosis: Cubital tunnel syndrome left elbow  Carpal tunnel syndrome left    Post-Operative Diagnosis:  Left carpal tunnel syndrome   Left cubital tunnel syndrome    Anesthesia:  General and regional    Technical Procedures Used:  left carpal tunnel release  Left cubital tunnel release with anterior transposition ulnar nerve    Description of the Findings of the Procedure:  The patient was taken to the operating room where general anesthesia was administered.  Regional anesthesia had been administered by the anesthesia service in the holding area.  After exsanguination of the extremity a proximal tourniquet was inflated to 250 mm of mercury.  Satisfactory anesthesia had been achieved the left hand was prepped and draped in the usual sterile fashion.  The patient did receive 2 g of Ancef intravenously preoperatively.  At this time a longitudinal incision was made in line with the 4th ray over the transverse carpal ligament.  The incision was extended using blunt and sharp dissection under 3.5 loupe magnification.  The transverse carpal ligament was identified and sharply incised under direct vision.  A complete release was performed and verified by the surgeon's small finger both proximally and distally.  No additional pathology was encountered.  Satisfactory release had been achieved skin was closed using horizontal mattress 4 0 nylon suture.  At this time attention was directed to the medial left elbow.  A longitudinal incision was made just anterior to the medial epicondyle.  Dissection was carried posterior to the medial  epicondyle.  The ulnar nerves identified in the cubital tunnel.  Ware's ligaments were released sharply.  The ulnar nerve was decompressed from the medial intermuscular septum to the 1st branch of the flexor carpi ulnaris.  The elbow was flexed and extended.  The ulnar nerve was unstable and wanted to sublux anteriorly.  An anterior transposition subcutaneous was performed.  A fascial sling was fashioned from the fascia from the medial elbow insertion of the flexor tendons.  This was sutured beneath the ulnar nerve to the subcutaneous tissue using 4-0 Vicryl suture.  Subcutaneous tissue was closed using 4-0 Vicryl suture.  Hemostasis was achieved using electrocautery.  Skin was closed using horizontal mattress 4-0 nylon suture.  Xeroform gauze 4x4s and 2 ABD pads were over wrapped with 3 in gauze dressing.  A sling was applied.  The patient tolerated the procedure well and was transferred to the recovery room in satisfactory condition.      Complications: No    Estimated Blood Loss (EBL): 5cc                        Condition: Good    Disposition: PACU - hemodynamically stable.    Attestation: I was present and scrubbed for the entire procedure.

## 2023-11-02 NOTE — TRANSFER OF CARE
"Anesthesia Transfer of Care Note    Patient: Alea Terrazas    Procedure(s) Performed: Procedure(s) (LRB):  RELEASE, CARPAL TUNNEL (Left)  RELEASE, CUBITAL TUNNEL (Left)  TRANSPOSITION, NERVE, ULNAR (Left)    Patient location: PACU    Anesthesia Type: general    Transport from OR: Transported from OR on room air with adequate spontaneous ventilation    Post pain: adequate analgesia    Post assessment: no apparent anesthetic complications    Post vital signs: stable    Level of consciousness: awake    Nausea/Vomiting: no nausea/vomiting    Complications: none    Transfer of care protocol was followed      Last vitals:   Visit Vitals  /82 (BP Location: Right arm, Patient Position: Lying)   Pulse 64   Temp 35.8 °C (96.5 °F) (Temporal)   Resp 18   Ht 5' 7" (1.702 m)   Wt 74.9 kg (165 lb 2 oz)   SpO2 99%   Breastfeeding No   BMI 25.86 kg/m²     "

## 2023-11-02 NOTE — ANESTHESIA PROCEDURE NOTES
Peripheral Block    Patient location during procedure: pre-op   Block not for primary anesthetic.  Reason for block: at surgeon's request and post-op pain management   Post-op Pain Location: left forearm and hand   Start time: 11/2/2023 7:00 AM  Timeout: 11/2/2023 7:00 AM   End time: 11/2/2023 7:03 AM    Staffing  Authorizing Provider: Humphrey Strickland MD  Performing Provider: Humphrey Strickland MD    Staffing  Performed by: Humphrey Strickland MD  Authorized by: Humphrey Strickland MD    Preanesthetic Checklist  Completed: patient identified, IV checked, site marked, risks and benefits discussed, surgical consent, monitors and equipment checked, pre-op evaluation and timeout performed  Peripheral Block  Patient position: supine  Prep: ChloraPrep  Patient monitoring: heart rate, cardiac monitor, continuous pulse ox, continuous capnometry and frequent blood pressure checks  Block type: supraclavicular  Laterality: left  Injection technique: single shot  Needle  Needle type: Stimuplex   Needle gauge: 22 G  Needle length: 4 in  Needle localization: anatomical landmarks and ultrasound guidance   -ultrasound image captured on disc.  Assessment  Injection assessment: negative aspiration, negative parasthesia and local visualized surrounding nerve  Paresthesia pain: none  Heart rate change: no  Slow fractionated injection: yes    Medications:    Medications: ropivacaine (NAROPIN) injection 0.5% - Perineural   20 mL - 11/2/2023 7:00:00 AM  lidocaine (PF) 20 mg/mL (2%) injection - Other   3 mL - 11/2/2023 7:00:00 AM    Additional Notes  VSS.  [facility] RN monitoring vitals throughout procedure.  Patient tolerated procedure well.

## 2023-11-02 NOTE — ANESTHESIA POSTPROCEDURE EVALUATION
Anesthesia Post Evaluation    Patient: Alea Terrazas    Procedure(s) Performed: Procedure(s) (LRB):  RELEASE, CARPAL TUNNEL (Left)  RELEASE, CUBITAL TUNNEL (Left)  TRANSPOSITION, NERVE, ULNAR (Left)    Final Anesthesia Type: general      Patient location during evaluation: PACU  Patient participation: Yes- Able to Participate  Level of consciousness: awake  Post-procedure vital signs: reviewed and stable  Pain management: adequate  Airway patency: patent    PONV status at discharge: No PONV  Anesthetic complications: no      Cardiovascular status: stable  Respiratory status: unassisted  Hydration status: euvolemic  Follow-up not needed.          Vitals Value Taken Time   /86 11/02/23 0827   Temp 37 °C (98.6 °F) 11/02/23 0802   Pulse 96 11/02/23 0827   Resp 22 11/02/23 0827   SpO2 91 % 11/02/23 0827   Vitals shown include unvalidated device data.      No case tracking events are documented in the log.      Pain/Justina Score: Pain Rating Prior to Med Admin: 7 (11/2/2023  8:22 AM)  Justina Score: 8 (11/2/2023  8:15 AM)

## 2023-11-02 NOTE — DISCHARGE SUMMARY
[facility]  Discharge Note  Short Stay    Procedure(s) (LRB):  RELEASE, CARPAL TUNNEL (Left)  RELEASE, CUBITAL TUNNEL (Left)  TRANSPOSITION, NERVE, ULNAR (Left) anterior subcutaneous      OUTCOME: Patient tolerated treatment/procedure well without complication and is now ready for discharge.    DISPOSITION: Home or Self Care    FINAL DIAGNOSIS:  Left carpal tunnel syndrome   Left cubital tunnel syndrome    FOLLOWUP: In clinic    DISCHARGE INSTRUCTIONS:    Discharge Procedure Orders   Diet general     Call MD for:  temperature >100.4     Call MD for:  persistent nausea and vomiting     Call MD for:  severe uncontrolled pain     Call MD for:  difficulty breathing, headache or visual disturbances     Call MD for:  redness, tenderness, or signs of infection (pain, swelling, redness, odor or green/yellow discharge around incision site)     Call MD for:  hives     Call MD for:  persistent dizziness or light-headedness     Call MD for:  extreme fatigue        TIME SPENT ON DISCHARGE:  20 minutes

## 2023-11-02 NOTE — DISCHARGE INSTRUCTIONS
After Hand Surgery  After surgery, the better you take care of yourself--especially your hand--the sooner it will heal. Follow your surgeons instructions. Try not to bump your hand, and dont move or lift anything while youre still wearing bandages, a splint, or a cast.  Care for your hand    Keep your hand elevated above heart level as much as possible for the first several days after surgery. This helps reduce swelling and pain.  To help prevent infection and speed healing, take care not to get your cast or bandages wet.  Keep dressing clean, dry, & intact until your follow up appointment.   Relieve pain as directed  Your surgeon may prescribe pain medicine or suggest you take an anti-inflammatory medicine. You might also be instructed to apply ice (or another cold source) to your hand. If you use ice cubes, put them in a plastic bag and rest it on top of your bandages. Leave the cold source on your hand for as long as its comfortable. Do this several times a day for the first few days after surgery. It may take several minutes before you can feel the cold through the cast or bandages.  Follow up with your surgeon  During a follow-up visit after surgery, your surgeon will check your progress. The stitches, bandages, splint, or cast may be removed. A new cast or splint may be placed. If your hand has healed enough, your surgeon may prescribe exercises.  Do prescribed hand exercises  Your surgeon may recommend that you do exercises. These may be done under the guidance of a physical or occupational therapist. The exercises strengthen your hand, help you regain flexibility, and restore proper function. Do the exercises as advised.  Call your surgeon if you have...  A fever higher than 100.4°F (38.0°C) taken by mouth  Side effects from your medicine, such as prolonged nausea  A wet or loose dressing, or a dressing that is too tight  Excessive bleeding  Increased, ongoing pain or numbness  Signs of infection (such  as drainage, warmth, or redness) at the incision site   Date Last Reviewed: 11/11/2015  © 8618-8898 [employer]. [address] [place] [address], [place] [address]. All rights reserved. This information is not intended as a substitute for professional medical care. Always follow your healthcare professional's instructions.        Nozin Instructions  Goal: the goal of Nozin is to reduce the risk of post-procedural infections by bacteria in the nasal cavity. Think of it as hand  for your nose.    How to use:    1. Shake Nozin bottle well    2. Take a cotton swab and apply 4 drops to one tip    3. Insert cotton tip into one nostril, being sure not to go deeper into nose than tip of the swab.    4. Swab nostril 6 times counterclockwise and 6 times clockwise. Make sure to swab the inside front pocket of the nostril.    5. Take swab out and apply 2 drops to the same cotton tip. Repeat steps 3 and 4 in the other nostril.        Do steps 1-5 twice a day for 7 days.

## 2023-11-07 ENCOUNTER — OFFICE VISIT (OUTPATIENT)
Dept: ORTHOPEDICS | Facility: CLINIC | Age: 68
End: 2023-11-07
Payer: MEDICARE

## 2023-11-07 VITALS — HEIGHT: 67 IN | BODY MASS INDEX: 25.9 KG/M2 | WEIGHT: 165 LBS

## 2023-11-07 DIAGNOSIS — M25.561 RIGHT KNEE PAIN, UNSPECIFIED CHRONICITY: ICD-10-CM

## 2023-11-07 DIAGNOSIS — Z98.890 S/P CUBITAL TUNNEL RELEASE: ICD-10-CM

## 2023-11-07 DIAGNOSIS — M25.561 ACUTE PAIN OF RIGHT KNEE: Primary | ICD-10-CM

## 2023-11-07 DIAGNOSIS — Z98.890 S/P CARPAL TUNNEL RELEASE: Primary | ICD-10-CM

## 2023-11-07 DIAGNOSIS — G56.01 RIGHT CARPAL TUNNEL SYNDROME: ICD-10-CM

## 2023-11-07 PROCEDURE — 1101F PT FALLS ASSESS-DOCD LE1/YR: CPT | Mod: CPTII,S$GLB,,

## 2023-11-07 PROCEDURE — 20526 PR INJECT CARPAL TUNNEL: ICD-10-PCS | Mod: 79,RT,S$GLB,

## 2023-11-07 PROCEDURE — 99024 POSTOP FOLLOW-UP VISIT: CPT | Mod: S$GLB,,,

## 2023-11-07 PROCEDURE — 1159F MED LIST DOCD IN RCRD: CPT | Mod: CPTII,S$GLB,,

## 2023-11-07 PROCEDURE — 99999 PR PBB SHADOW E&M-EST. PATIENT-LVL III: CPT | Mod: PBBFAC,,,

## 2023-11-07 PROCEDURE — 99999 PR PBB SHADOW E&M-EST. PATIENT-LVL III: ICD-10-PCS | Mod: PBBFAC,,,

## 2023-11-07 PROCEDURE — 20526 THER INJECTION CARP TUNNEL: CPT | Mod: 79,RT,S$GLB,

## 2023-11-07 PROCEDURE — 3288F PR FALLS RISK ASSESSMENT DOCUMENTED: ICD-10-PCS | Mod: CPTII,S$GLB,,

## 2023-11-07 PROCEDURE — 99024 PR POST-OP FOLLOW-UP VISIT: ICD-10-PCS | Mod: S$GLB,,,

## 2023-11-07 PROCEDURE — 1125F AMNT PAIN NOTED PAIN PRSNT: CPT | Mod: CPTII,S$GLB,,

## 2023-11-07 PROCEDURE — 1101F PR PT FALLS ASSESS DOC 0-1 FALLS W/OUT INJ PAST YR: ICD-10-PCS | Mod: CPTII,S$GLB,,

## 2023-11-07 PROCEDURE — 1159F PR MEDICATION LIST DOCUMENTED IN MEDICAL RECORD: ICD-10-PCS | Mod: CPTII,S$GLB,,

## 2023-11-07 PROCEDURE — 1125F PR PAIN SEVERITY QUANTIFIED, PAIN PRESENT: ICD-10-PCS | Mod: CPTII,S$GLB,,

## 2023-11-07 PROCEDURE — 3288F FALL RISK ASSESSMENT DOCD: CPT | Mod: CPTII,S$GLB,,

## 2023-11-07 RX ORDER — TRIAMCINOLONE ACETONIDE 40 MG/ML
40 INJECTION, SUSPENSION INTRA-ARTICULAR; INTRAMUSCULAR
Status: DISCONTINUED | OUTPATIENT
Start: 2023-11-07 | End: 2023-11-07 | Stop reason: HOSPADM

## 2023-11-07 RX ADMIN — TRIAMCINOLONE ACETONIDE 40 MG: 40 INJECTION, SUSPENSION INTRA-ARTICULAR; INTRAMUSCULAR at 01:11

## 2023-11-07 NOTE — PROGRESS NOTES
SUBJECTIVE:      Patient ID: Alea Terrazas is a 67 y.o. female.    HPI: Ms. Terrazas is here today for post-operative visit #1.  She is 5 days status post RELEASE, CARPAL TUNNEL (Left)  RELEASE, CUBITAL TUNNEL (Left)  TRANSPOSITION, NERVE, ULNAR (Left) anterior subcutaneous by Dr. Meyer on 11/2/23. She reports that she is doing well. Pain is 3/10. She is taking the Percocet as directed for pain. She has been compliant with postop instructions and keeping the extremity dry. She denies fever, chills, and sweats since the time of the surgery.     She admits to R carpal tunnel pain today. Last CSI to the area was 7/14/23. She states that the pain and numbness is worst in the morning. She runs her hand under warm water for relief. She requests repeat injection today. Denies fever, chills, sweats.    Also reports R knee pain worse with activity and would like to see a specialist for this. She has a hx of a L TKA done at an outside facility and is not interested in a surgical evaluation at this time.    Past Medical History:   Diagnosis Date    Anticoagulant long-term use     Anxiety     Arthritis     Carpal tunnel syndrome     Chronic neck and back pain     Hypertension      Past Surgical History:   Procedure Laterality Date    CARPAL TUNNEL RELEASE Left     CARPAL TUNNEL RELEASE Right 09/05/2019    Procedure: RELEASE, CARPAL TUNNEL;  Surgeon: Billy Meyer MD;  Location: [facility] OR;  Service: Orthopedics;  Laterality: Right;  Confirmed anesthesia type with CRNA.    CARPAL TUNNEL RELEASE Left 11/2/2023    Procedure: RELEASE, CARPAL TUNNEL;  Surgeon: Billy Meyer MD;  Location: [facility] OR;  Service: Orthopedics;  Laterality: Left;    CERVICAL FUSION  2016, 2017    HERNIA REPAIR      JOINT REPLACEMENT Left     knee    ROTATOR CUFF REPAIR Right 2015    TRIGGER FINGER RELEASE Right 09/05/2019    Procedure: RELEASE, TRIGGER FINGER;  Surgeon: Billy Meyer MD;  Location: [facility] OR;  Service: Orthopedics;   Laterality: Right;  right thumb   Confirmed anesthesia type with CRNA.    ULNAR NERVE TRANSPOSITION Left 11/2/2023    Procedure: TRANSPOSITION, NERVE, ULNAR;  Surgeon: Billy Meyer MD;  Location: [facility] OR;  Service: Orthopedics;  Laterality: Left;    ULNAR TUNNEL RELEASE Right 10/01/2020    Procedure: RELEASE, CUBITAL TUNNEL;  Surgeon: Billy Meyer MD;  Location: [facility] OR;  Service: Orthopedics;  Laterality: Right;  lower arm (near elbow)  Per MARK Kent CRNA, general anesthesia used.  Per surgeon, anterior transposition ulnar nerve was NOT peformed.      ULNAR TUNNEL RELEASE Left 11/2/2023    Procedure: RELEASE, CUBITAL TUNNEL;  Surgeon: Billy Meyer MD;  Location: [facility] OR;  Service: Orthopedics;  Laterality: Left;     Family History   Problem Relation Age of Onset    Cancer Father     Diabetes Father     Heart failure Father     COPD Mother 41    Kidney failure Mother     No Known Problems Brother     No Known Problems Brother      Social History     Socioeconomic History    Marital status:    Tobacco Use    Smoking status: Some Days     Current packs/day: 0.50     Average packs/day: 0.5 packs/day for 41.0 years (20.5 ttl pk-yrs)     Types: Cigarettes    Smokeless tobacco: Never   Substance and Sexual Activity    Alcohol use: Not Currently     Comment: occasionally    Drug use: No     Medication List with Changes/Refills   Current Medications    ALBUTEROL (PROVENTIL/VENTOLIN HFA) 90 MCG/ACTUATION INHALER    INHALE 1 TO 2 PUFFS BY MOUTH INTO THE LUNGS EVERY 4-6 HOURS AS NEEDED    AQUANAZ 10- MG TAB    Take 1 tablet by mouth every 6 (six) hours as needed. Hold 3 days prior to surgery    CELECOXIB (CELEBREX) 200 MG CAPSULE    Take 200 mg by mouth.    CETIRIZINE (ZYRTEC) 10 MG TABLET    Take 10 mg by mouth once daily.     CHOLECALCIFEROL, VITAMIN D3, 1,250 MCG (50,000 UNIT) CAPSULE    Take 50,000 Units by mouth every 7 days.    CYCLOBENZAPRINE (FLEXERIL) 10 MG TABLET    Take  "10 mg by mouth 3 (three) times daily as needed.     ERGOCALCIFEROL (ERGOCALCIFEROL) 50,000 UNIT CAP    Take 50,000 Units by mouth every 7 days.     FLUTICASONE (FLONASE) 50 MCG/ACTUATION NASAL SPRAY    2 sprays by Each Nare route once daily.    GABAPENTIN (NEURONTIN) 300 MG CAPSULE    Take 300 mg by mouth nightly as needed.     GABAPENTIN ENACARBIL 600 MG TBSR    Take 600 mg by mouth.    HYDROCHLOROTHIAZIDE (HYDRODIURIL) 25 MG TABLET    Take 25 mg by mouth once daily.    LISINOPRIL-HYDROCHLOROTHIAZIDE (PRINZIDE,ZESTORETIC) 20-12.5 MG PER TABLET    Take by mouth.    MELOXICAM (MOBIC) 15 MG TABLET    Take 15 mg by mouth once daily. Hold 4 days prior to surgery    OXYCODONE-ACETAMINOPHEN (PERCOCET)  MG PER TABLET    Take 1 tablet by mouth every 6 (six) hours as needed for Pain.    OXYCODONE-ACETAMINOPHEN (PERCOCET) 5-325 MG PER TABLET    Take 1 tablet by mouth 2 (two) times daily.     PANTOPRAZOLE (PROTONIX) 40 MG TABLET    Take 40 mg by mouth 2 (two) times daily.    TRIAMTERENE-HYDROCHLOROTHIAZIDE 37.5-25 MG (DYAZIDE) 37.5-25 MG PER CAPSULE    Dyazide Take capsule 1 time per day No date recorded capsule 1 time per day No route recorded No set duration recorded No set duration amount recorded suspended 37.5-25 mg     Review of patient's allergies indicates:   Allergen Reactions    Codeine Palpitations       OBJECTIVE:     Physical exam:    Vitals:    11/07/23 1255   Weight: 74.8 kg (165 lb)   Height: 5' 7" (1.702 m)   PainSc:   3   PainLoc: Wrist     Vital signs are stable, patient is afebrile.  Patient is well dressed and well groomed, no acute distress.  Alert and oriented to person, place, and time.    Left UE:  Post op dressing taken down.   Incision is clean, dry, and intact x2.   There is no erythema or exudate. There is no sign of any infection.   Mild swelling to elbow and wrist  Mild ecchymosis locally  She is NVI.   Sutures in place.   2+ pulses noted.  Cap refill <2 seconds  Motor intact to " hand    Right UE:  +Tinels at wrist  Median n distribution sensation decreased  Ulnar n distribution sensation decreased  No motor or sensory deficits  No signs of infection    EMG 9/25/23  Conclusion:    1. Bilateral cubital tunnel syndrome, L>R. The left side is severely affected; the right side is moderate to severely affected.  2. Bilateral carpal tunnel syndrome, R>L. The right side is moderately affected; the left side is mild to moderately affected.    ASSESSMENT         Encounter Diagnoses   Name Primary?    S/P carpal tunnel release Yes    S/P cubital tunnel release     Right carpal tunnel syndrome     Right knee pain, unspecified chronicity             5 days status post RELEASE, CARPAL TUNNEL (Left)  RELEASE, CUBITAL TUNNEL (Left)  TRANSPOSITION, NERVE, ULNAR (Left) anterior subcutaneous    PLAN:           Alea was seen today for pain, post-op evaluation, pain and post-op evaluation.    Diagnoses and all orders for this visit:    S/P carpal tunnel release    S/P cubital tunnel release    Right carpal tunnel syndrome  -     Carpal Tunnel    Right knee pain, unspecified chronicity      - PO instruction reviewed and provided to patient  - The incision x2 was cleaned with hydrogen peroxide and normal saline. A sterile Band-Aid was applied.   - Patient may clean the incisions daily as above.   - CT brace provided today. Patient may use brace as needed for symptomatic relief.    - Right carpal tunnel injection today. Patient must wait at least 3 months between injections  - Appt made with sports med for R knee  - Patient should notify the office of any signs or symptoms of infection including fevers, erythema, purulent drainage, increasing pain.    - Follow up for suture removal     PROCEDURE:  I have explained the risks, benefits, and alternatives of the procedure in detail.  The patient voices understanding and all questions have been answered.  The patient agrees to proceed as planned. So after a sterile  prep of the skin in the normal fashion the right carpal tunnel is injected from the volar approach using a 25 gauge needle with a combination of 1cc 2% plain lidocaine and 1cc of kenalog.  The patient is cautioned and immediate relief of pain is secondary to the local anesthetic and will be temporary.  After the anesthetic wears off there may be a increase in pain that may last for a few hours or a few days and they should use ice to help alleviate this flare up of pain. Patient tolerated the procedure well.       POST OPERATIVE INSTRUCTIONS - Visit #1    BANDAGES/DRESSING/BATHING:  We have removed the dressing, cleaned your incision, and put on a band-aid at your first post op visit today. You may clean the incision daily as we did today. Keep the incision clean and dry. When showering, you may cover the incision with a plastic bag/umbrella bag.   Do not use Neosporin or other topical ointments or creams on the incision. If you are concerned about any redness or drainage of the incisions, please call the office.    SWELLING  Some swelling of your arm, hand and fingers is normal. The swelling can be decreased by  elevating your arm on a few pillows when lying down. Swelling can be further controlled by cold therapy over the surgical site. Flexion/extension of the fingers (opening and closing your hands) will also help to relieve swelling and prevent stiffness.    ACTIVITY  You may use the hand and wrist as tolerated for light activity. You are encouraged to bend the wrist, elbow and fingers as soon as the initial surgical dressing is removed. Avoid lifting, pushing, or pulling any object greater than 5-10 pounds for the first 10-14 days.     BRACE  Wear your brace or splint as directed.    MEDICATIONS  Take pain medicines exactly as directed.  If the doctor gave you a prescription medicine for pain, take it as prescribed.  If you are not taking a prescription pain medicine, take an over-the-counter medicine such as  acetaminophen (Tylenol), ibuprofen (Advil, Motrin), or naproxen (Aleve) as long as you do not have an allergy or medical condition that prevents you from taking them.  Do not take two or more pain medicines at the same time unless the doctor told you to. Many pain medicines have acetaminophen, which is Tylenol. Too much acetaminophen (Tylenol) can be harmful.    FOLLOW -UP  You should be scheduled for a post-op appointment within the 10-14 days following surgery, at which time we will review your surgery, remove sutures and evaluate incisions, review your post-operative program and answer any of your questions.          Monica Leonard PA-C   [facility]

## 2023-11-07 NOTE — PROCEDURES
Carpal Tunnel    Date/Time: 11/7/2023 1:00 PM    Performed by: Monica Leonard PA-C  Authorized by: Monica Leonard PA-C    Consent Done?:  Yes (Verbal)  Indications:  Pain  Site marked: the procedure site was marked    Timeout: prior to procedure the correct patient, procedure, and site was verified    Prep: patient was prepped and draped in usual sterile fashion      Local anesthesia used?: Yes    Local anesthetic:  Lidocaine 2% without epinephrine  Anesthetic total (ml):  1    Location:  Wrist (right)  Ultrasonic Guidance for Needle Placement?: No    Needle size:  25 G  Approach:  Volar  Medications:  40 mg triamcinolone acetonide 40 mg/mL  Patient tolerance:  Patient tolerated the procedure well with no immediate complications

## 2023-11-15 ENCOUNTER — HOSPITAL ENCOUNTER (OUTPATIENT)
Dept: RADIOLOGY | Facility: HOSPITAL | Age: 68
Discharge: HOME | End: 2023-11-15
Payer: MEDICARE

## 2023-11-15 ENCOUNTER — OFFICE VISIT (OUTPATIENT)
Dept: SPORTS MEDICINE | Facility: CLINIC | Age: 68
End: 2023-11-15
Payer: MEDICARE

## 2023-11-15 VITALS — HEIGHT: 67 IN | WEIGHT: 164.88 LBS | BODY MASS INDEX: 25.88 KG/M2

## 2023-11-15 DIAGNOSIS — M17.11 PRIMARY OSTEOARTHRITIS OF RIGHT KNEE: Primary | ICD-10-CM

## 2023-11-15 DIAGNOSIS — M25.561 ACUTE PAIN OF RIGHT KNEE: ICD-10-CM

## 2023-11-15 PROCEDURE — 3008F BODY MASS INDEX DOCD: CPT | Mod: CPTII,S$GLB,, | Performed by: STUDENT IN AN ORGANIZED HEALTH CARE EDUCATION/TRAINING PROGRAM

## 2023-11-15 PROCEDURE — 99999 PR PBB SHADOW E&M-EST. PATIENT-LVL III: ICD-10-PCS | Mod: PBBFAC,,, | Performed by: STUDENT IN AN ORGANIZED HEALTH CARE EDUCATION/TRAINING PROGRAM

## 2023-11-15 PROCEDURE — 1159F MED LIST DOCD IN RCRD: CPT | Mod: CPTII,S$GLB,, | Performed by: STUDENT IN AN ORGANIZED HEALTH CARE EDUCATION/TRAINING PROGRAM

## 2023-11-15 PROCEDURE — 73562 X-RAY EXAM OF KNEE 3: CPT | Mod: TC,59,LT

## 2023-11-15 PROCEDURE — 20610 DRAIN/INJ JOINT/BURSA W/O US: CPT | Mod: RT,S$GLB,, | Performed by: STUDENT IN AN ORGANIZED HEALTH CARE EDUCATION/TRAINING PROGRAM

## 2023-11-15 PROCEDURE — 73562 XR KNEE ORTHO RIGHT WITH FLEXION: ICD-10-PCS | Mod: 26,LT,, | Performed by: RADIOLOGY

## 2023-11-15 PROCEDURE — 99204 OFFICE O/P NEW MOD 45 MIN: CPT | Mod: 25,S$GLB,, | Performed by: STUDENT IN AN ORGANIZED HEALTH CARE EDUCATION/TRAINING PROGRAM

## 2023-11-15 PROCEDURE — 1159F PR MEDICATION LIST DOCUMENTED IN MEDICAL RECORD: ICD-10-PCS | Mod: CPTII,S$GLB,, | Performed by: STUDENT IN AN ORGANIZED HEALTH CARE EDUCATION/TRAINING PROGRAM

## 2023-11-15 PROCEDURE — 1160F RVW MEDS BY RX/DR IN RCRD: CPT | Mod: CPTII,S$GLB,, | Performed by: STUDENT IN AN ORGANIZED HEALTH CARE EDUCATION/TRAINING PROGRAM

## 2023-11-15 PROCEDURE — 20610 LARGE JOINT ASPIRATION/INJECTION: R KNEE: ICD-10-PCS | Mod: RT,S$GLB,, | Performed by: STUDENT IN AN ORGANIZED HEALTH CARE EDUCATION/TRAINING PROGRAM

## 2023-11-15 PROCEDURE — 99999 PR PBB SHADOW E&M-EST. PATIENT-LVL III: CPT | Mod: PBBFAC,,, | Performed by: STUDENT IN AN ORGANIZED HEALTH CARE EDUCATION/TRAINING PROGRAM

## 2023-11-15 PROCEDURE — 73564 XR KNEE ORTHO RIGHT WITH FLEXION: ICD-10-PCS | Mod: 26,RT,, | Performed by: RADIOLOGY

## 2023-11-15 PROCEDURE — 99204 PR OFFICE/OUTPT VISIT, NEW, LEVL IV, 45-59 MIN: ICD-10-PCS | Mod: 25,S$GLB,, | Performed by: STUDENT IN AN ORGANIZED HEALTH CARE EDUCATION/TRAINING PROGRAM

## 2023-11-15 PROCEDURE — 1160F PR REVIEW ALL MEDS BY PRESCRIBER/CLIN PHARMACIST DOCUMENTED: ICD-10-PCS | Mod: CPTII,S$GLB,, | Performed by: STUDENT IN AN ORGANIZED HEALTH CARE EDUCATION/TRAINING PROGRAM

## 2023-11-15 PROCEDURE — 73564 X-RAY EXAM KNEE 4 OR MORE: CPT | Mod: 26,RT,, | Performed by: RADIOLOGY

## 2023-11-15 PROCEDURE — 3008F PR BODY MASS INDEX (BMI) DOCUMENTED: ICD-10-PCS | Mod: CPTII,S$GLB,, | Performed by: STUDENT IN AN ORGANIZED HEALTH CARE EDUCATION/TRAINING PROGRAM

## 2023-11-15 PROCEDURE — 73562 X-RAY EXAM OF KNEE 3: CPT | Mod: 26,LT,, | Performed by: RADIOLOGY

## 2023-11-15 RX ORDER — TRIAMCINOLONE ACETONIDE 40 MG/ML
40 INJECTION, SUSPENSION INTRA-ARTICULAR; INTRAMUSCULAR
Status: DISCONTINUED | OUTPATIENT
Start: 2023-11-15 | End: 2023-11-15 | Stop reason: HOSPADM

## 2023-11-15 RX ADMIN — TRIAMCINOLONE ACETONIDE 40 MG: 40 INJECTION, SUSPENSION INTRA-ARTICULAR; INTRAMUSCULAR at 08:11

## 2023-11-15 NOTE — PROCEDURES
Large Joint Aspiration/Injection: R knee    Date/Time: 11/15/2023 8:40 AM    Performed by: Nick Syed MD  Authorized by: Nick Syed MD    Consent Done?:  Yes (Verbal)  Indications:  Arthritis and pain  Site marked: the procedure site was marked    Timeout: prior to procedure the correct patient, procedure, and site was verified    Prep: patient was prepped and draped in usual sterile fashion    Local anesthetic:  Bupivacaine 0.5% without epinephrine and lidocaine 1% without epinephrine    Details:  Needle Size:  22 G  Approach:  Anterolateral  Location:  Knee  Site:  R knee  Medications:  40 mg triamcinolone acetonide 40 mg/mL  Patient tolerance:  Patient tolerated the procedure well with no immediate complications

## 2023-11-15 NOTE — PATIENT INSTRUCTIONS
Assessment:  Alea Terrazas is a 67 y.o. female   Chief Complaint   Patient presents with    Right Knee - Pain       No diagnosis found.     Plan:  Cortizone steroid injection given in right knee today  We discussed the proper protocols after the injection such as no submerging pools, baths tubs, or hot tubs for 24 hr.  Showering is okay today.  We also discussed that blood sugars can be elevated after an injection and asked patient to properly checked her sugars over the next few days and contact their PCP if there are any concerns.  We discussed red flags such as fevers, chills, red, warm, tender joint at the area of injection to please seek medical care immediately.    Apply topical diclofenac (Voltaren) up to 4 times a day to the affected area.  It can be bought over the counter at any local pharmacy.    Continue Meloxicam as prescribed  Patient can start ice as needed                      General Arthritis info:    -shiny white stuff at end of a chicken bones is cartilage    -arthritis is wearing away of the cartilage that lines the end of your bones    -osteoarthritis is thought to be a wear and tear phenomenon    -symptoms are due to inflammation of joint causing stiffness, aching, and sometimes swelling    -occasionally sharp pain will occur causing a give way sensation    -Risk factors: genetic, weight, female > male, age    Treatment options:    -maintain healthy weight (every pound is 4 pounds of pressure on the knee)    -daily moderate exercise (walk, bike, swim 30 minutes per day) to keep joints moving    -daily strengthening exercises (through therapy or on own) to keep muscles supporting joint healthy and strong    -glucosamine 1500mg daily (look for USP label on bottle)    -tylenol as needed for pain (follow directions on the bottle)    -anti-inflammatory medication such as alleve may be helpful- take 1-2 tabs twice daily for 7 days. If it helps your pain, continue. If you do not feel any change, you  may stop and then take it as needed.    (you may be given a once daily anti-inflammatory such as MOBIC. If given, avoid other anti-inflammatory medications such as advil, ibuprofen, motrin, naprosyn, alleve, etc)    -if swollen and painful, ice, decrease activity, and take anti-inflammatory daily for 5-7 days and if no relief call your doctor for further options    -consider cortisone injection (every 3-4 months at most)- anti- inflammatory steroid medication that can be injected directly into the joint to reduce inflammation    -consider hyaluronic acid injections (eufflexxa, hyalgan, synvisc, supartz) (every 6 months at most)- protein injection that helps decrease pain and irritability in the joint. It is best used to help prolong intervals between cortisone injections to minimize steroid injections. These are currently approved for knee injections. Discuss with your doctor if other joint involved. Call to seek approval prior to the injections.    -long-term treatment may include a total joint replacement (keep diary of good days and bad days, then evaluate as to when you are ready)                                                Follow-up: as needed.    Thank you for choosing [facility] and Dr. Nick Syed for your orthopedic & sports medicine care. It is our goal to provide you with exceptional care that will help keep you healthy, active, and get you back in the game.    Please do not hesitate to reach out to us via email, phone, or MyChart with any questions, concerns, or feedback.    If you felt that you received exemplary care today, please consider leaving us feedback on [employer] at:  [email or web address]    If you are experiencing pain/discomfort ,or have questions after 5pm and would like to be connected to the [facility] [place] on-call team, please call this number and specify which Sports Medicine provider  is treating you: [phone]

## 2023-11-15 NOTE — PROGRESS NOTES
Patient ID: Alea Terrazas  YOB: 1955  MRN: [id number]    Chief Complaint: Pain of the Right Knee      Referred By: Monica Leonard PA-C    History of Present Illness: Alea Terrazas is a right-hand dominant 67 y.o. female who presents today with right knee pain waxing and weaning for years. Pain is worse with activity, she is not interested in surgery, she would like to try conservative treatment for now.. She has a hx of a L TKA done at an outside facility. Pain 8/10, achy, throbbing. Has not tried PT.    The patient is active in none.  Occupation: none      Past Medical History:   Past Medical History:   Diagnosis Date    Anticoagulant long-term use     Anxiety     Arthritis     Carpal tunnel syndrome     Chronic neck and back pain     Hypertension      Past Surgical History:   Procedure Laterality Date    CARPAL TUNNEL RELEASE Left     CARPAL TUNNEL RELEASE Right 09/05/2019    Procedure: RELEASE, CARPAL TUNNEL;  Surgeon: Billy Meyer MD;  Location: [facility] OR;  Service: Orthopedics;  Laterality: Right;  Confirmed anesthesia type with CRNA.    CARPAL TUNNEL RELEASE Left 11/2/2023    Procedure: RELEASE, CARPAL TUNNEL;  Surgeon: Billy Meyer MD;  Location: [facility] OR;  Service: Orthopedics;  Laterality: Left;    CERVICAL FUSION  2016, 2017    HERNIA REPAIR      JOINT REPLACEMENT Left     knee    ROTATOR CUFF REPAIR Right 2015    TRIGGER FINGER RELEASE Right 09/05/2019    Procedure: RELEASE, TRIGGER FINGER;  Surgeon: Billy Meyer MD;  Location: [facility] OR;  Service: Orthopedics;  Laterality: Right;  right thumb   Confirmed anesthesia type with CRNA.    ULNAR NERVE TRANSPOSITION Left 11/2/2023    Procedure: TRANSPOSITION, NERVE, ULNAR;  Surgeon: Billy Meyer MD;  Location: [facility] OR;  Service: Orthopedics;  Laterality: Left;    ULNAR TUNNEL RELEASE Right 10/01/2020    Procedure: RELEASE, CUBITAL TUNNEL;  Surgeon: Billy Meyer MD;  Location: [facility] OR;  Service:  Orthopedics;  Laterality: Right;  lower arm (near elbow)  Per MARK Kent CRNA, general anesthesia used.  Per surgeon, anterior transposition ulnar nerve was NOT peformed.      ULNAR TUNNEL RELEASE Left 11/2/2023    Procedure: RELEASE, CUBITAL TUNNEL;  Surgeon: Billy Meyer MD;  Location: [facility];  Service: Orthopedics;  Laterality: Left;     Family History   Problem Relation Age of Onset    Cancer Father     Diabetes Father     Heart failure Father     COPD Mother 41    Kidney failure Mother     No Known Problems Brother     No Known Problems Brother      Social History     Socioeconomic History    Marital status:    Tobacco Use    Smoking status: Some Days     Current packs/day: 0.50     Average packs/day: 0.5 packs/day for 41.0 years (20.5 ttl pk-yrs)     Types: Cigarettes    Smokeless tobacco: Never   Substance and Sexual Activity    Alcohol use: Not Currently     Comment: occasionally    Drug use: No     Medication List with Changes/Refills   Current Medications    ALBUTEROL (PROVENTIL/VENTOLIN HFA) 90 MCG/ACTUATION INHALER    INHALE 1 TO 2 PUFFS BY MOUTH INTO THE LUNGS EVERY 4-6 HOURS AS NEEDED    AQUANAZ 10- MG TAB    Take 1 tablet by mouth every 6 (six) hours as needed. Hold 3 days prior to surgery    CELECOXIB (CELEBREX) 200 MG CAPSULE    Take 200 mg by mouth.    CETIRIZINE (ZYRTEC) 10 MG TABLET    Take 10 mg by mouth once daily.     CHOLECALCIFEROL, VITAMIN D3, 1,250 MCG (50,000 UNIT) CAPSULE    Take 50,000 Units by mouth every 7 days.    CYCLOBENZAPRINE (FLEXERIL) 10 MG TABLET    Take 10 mg by mouth 3 (three) times daily as needed.     ERGOCALCIFEROL (ERGOCALCIFEROL) 50,000 UNIT CAP    Take 50,000 Units by mouth every 7 days.     FLUTICASONE (FLONASE) 50 MCG/ACTUATION NASAL SPRAY    2 sprays by Each Nare route once daily.    GABAPENTIN (NEURONTIN) 300 MG CAPSULE    Take 300 mg by mouth nightly as needed.     GABAPENTIN ENACARBIL 600 MG TBSR    Take 600 mg by mouth.     HYDROCHLOROTHIAZIDE (HYDRODIURIL) 25 MG TABLET    Take 25 mg by mouth once daily.    LISINOPRIL-HYDROCHLOROTHIAZIDE (PRINZIDE,ZESTORETIC) 20-12.5 MG PER TABLET    Take by mouth.    MELOXICAM (MOBIC) 15 MG TABLET    Take 15 mg by mouth once daily. Hold 4 days prior to surgery    OXYCODONE-ACETAMINOPHEN (PERCOCET)  MG PER TABLET    Take 1 tablet by mouth every 6 (six) hours as needed for Pain.    OXYCODONE-ACETAMINOPHEN (PERCOCET) 5-325 MG PER TABLET    Take 1 tablet by mouth 2 (two) times daily.     PANTOPRAZOLE (PROTONIX) 40 MG TABLET    Take 40 mg by mouth 2 (two) times daily.    TRIAMTERENE-HYDROCHLOROTHIAZIDE 37.5-25 MG (DYAZIDE) 37.5-25 MG PER CAPSULE    Dyazide Take capsule 1 time per day No date recorded capsule 1 time per day No route recorded No set duration recorded No set duration amount recorded suspended 37.5-25 mg     Review of patient's allergies indicates:   Allergen Reactions    Codeine Palpitations       Physical Exam:   Body mass index is 25.83 kg/m².    GENERAL: Well appearing, in no acute distress.  HEAD: Normocephalic and atraumatic.  ENT: External ears and nose grossly normal.  EYES: EOMI bilaterally  PULMONARY: Respirations are grossly even and non-labored.  NEURO: Awake, alert, and oriented x 3.  SKIN: No obvious rashes appreciated.  PSYCH: Mood & affect are appropriate.    Detailed MSK exam:     Right knee exam:   -ROM: extension 0, flexion 130  -TTP: Medial joint line and Lateral joint line  -effusion: trace  -Patellar apprehension negative  -Mason test negative  -stable to varus and valgus stress tests  -Lachman test negative, anterior drawer test negative, posterior drawer test negative        Imaging:  X-ray Knee Ortho Right with Flexion  Narrative: EXAM:  XR KNEE ORTHO RIGHT WITH FLEXION    CLINICAL HISTORY: Right knee pain.    FINDINGS: AP Standing, Flexion and sunrise view bilaterally and lateral view of the right knee.  No comparison.     Mild medial compartment narrowing  and sclerosis.  Left knee replacement without dislocation or loosening.  No fracture is identified.  Joint alignment is anatomic.  No significant joint effusion is identified.  The joint spaces appear relatively well maintained.  Impression:  No acute radiographic abnormality of the right knee.    Finalized on: 11/15/2023 8:53 AM By:  Chandler Jolly MD  [facility]# [id number]      2023-11-15 08:[id number]    [facility]        Relevant imaging results were reviewed and interpreted by me and per my read shows mild medial compartment joint space narrowing right knee.  This was discussed with the patient and / or family today.     Assessment:  Alea Terrazas is a 67 y.o. female presenting with chronic right knee pain.   History, physical and radiographs are consistent with a likely diagnosis of OA.   Plan: Steroid injection given today (see separate procedure note for details). We discussed the proper protocols after the injection such as no submerging pools, baths tubs, or hot tubs for 24 hr.  Showering is okay today.  We also discussed that blood sugars can be elevated after an injection and asked patient to properly checked her sugars over the next few days and contact their PCP if there are any concerns.  We discussed red flags such as fevers, chills, red, warm, tender joint at the area of injection to please seek medical care immediately.   Consider gel injection if not improving. Consider iovera procedure if not improving. Continue conservative management for pain.   Follow up as needed. All questions answered.      Primary osteoarthritis of right knee  -     Large Joint Aspiration/Injection: R knee         MEDICAL NECESSITY FOR VISCOSUPPLEMENTATION: After thorough evaluation of the patient, I have determined that visco-supplementation is medically necessary. The patient has painful degenerative changes of the knee with failure of conservative treatments including lifestyle modifications and rehabilitation exercises.  Oral  analgesis/NSAIDs have not adequately controlled symptoms and there is radiographic evidence of Kellgren Elliott grade 2 or greater osteoarthritic changes, or in lack of radiographic evidence, there is arthroscopic or other evidence of chondrosis.      A copy of today's visit note has been sent to the referring provider.     Electronically signed:  Nick Syed MD, MPH  11/15/2023  9:18 AM

## 2023-11-15 NOTE — PROGRESS NOTES
SUBJECTIVE:      Patient ID: Alea Terrazas is a 67 y.o. female.    HPI: Ms. Terrazas is here today for post-operative visit #2.  She is 14 days status post RELEASE, CARPAL TUNNEL (Left)  RELEASE, CUBITAL TUNNEL (Left)  TRANSPOSITION, NERVE, ULNAR (Left) anterior subcutaneous by Dr. Meyer on 11/2/23. She reports that she is doing well.  Pain is 5/10.  She is still taking Percocet for pain.  She has been compliant with postop instructions and keeping the extremity dry. She denies fever, chills, and sweats since the time of the surgery.     Interval hx 11/7/23: Ms. Terrazas is here today for post-operative visit #1.  She is 5 days status post RELEASE, CARPAL TUNNEL (Left)  RELEASE, CUBITAL TUNNEL (Left)  TRANSPOSITION, NERVE, ULNAR (Left) anterior subcutaneous by Dr. Meyer on 11/2/23. She reports that she is doing well. Pain is 3/10. She is taking the Percocet as directed for pain. She has been compliant with postop instructions and keeping the extremity dry. She denies fever, chills, and sweats since the time of the surgery.      She admits to R carpal tunnel pain today. Last CSI to the area was 7/14/23. She states that the pain and numbness is worst in the morning. She runs her hand under warm water for relief. She requests repeat injection today. Denies fever, chills, sweats.     Also reports R knee pain worse with activity and would like to see a specialist for this. She has a hx of a L TKA done at an outside facility and is not interested in a surgical evaluation at this time.    Past Medical History:   Diagnosis Date    Anticoagulant long-term use     Anxiety     Arthritis     Carpal tunnel syndrome     Chronic neck and back pain     Hypertension      Past Surgical History:   Procedure Laterality Date    CARPAL TUNNEL RELEASE Left     CARPAL TUNNEL RELEASE Right 09/05/2019    Procedure: RELEASE, CARPAL TUNNEL;  Surgeon: Billy Meyer MD;  Location: [facility];  Service: Orthopedics;  Laterality: Right;   Confirmed anesthesia type with CRNA.    CARPAL TUNNEL RELEASE Left 11/2/2023    Procedure: RELEASE, CARPAL TUNNEL;  Surgeon: Billy Meyer MD;  Location: [facility] OR;  Service: Orthopedics;  Laterality: Left;    CERVICAL FUSION  2016, 2017    HERNIA REPAIR      JOINT REPLACEMENT Left     knee    ROTATOR CUFF REPAIR Right 2015    TRIGGER FINGER RELEASE Right 09/05/2019    Procedure: RELEASE, TRIGGER FINGER;  Surgeon: Billy Meyer MD;  Location: [facility] OR;  Service: Orthopedics;  Laterality: Right;  right thumb   Confirmed anesthesia type with CRNA.    ULNAR NERVE TRANSPOSITION Left 11/2/2023    Procedure: TRANSPOSITION, NERVE, ULNAR;  Surgeon: Billy Meyer MD;  Location: [facility] OR;  Service: Orthopedics;  Laterality: Left;    ULNAR TUNNEL RELEASE Right 10/01/2020    Procedure: RELEASE, CUBITAL TUNNEL;  Surgeon: Billy Meyer MD;  Location: [facility] OR;  Service: Orthopedics;  Laterality: Right;  lower arm (near elbow)  Per MARK Kent CRNA, general anesthesia used.  Per surgeon, anterior transposition ulnar nerve was NOT peformed.      ULNAR TUNNEL RELEASE Left 11/2/2023    Procedure: RELEASE, CUBITAL TUNNEL;  Surgeon: Billy Meyer MD;  Location: [facility];  Service: Orthopedics;  Laterality: Left;     Family History   Problem Relation Age of Onset    Cancer Father     Diabetes Father     Heart failure Father     COPD Mother 41    Kidney failure Mother     No Known Problems Brother     No Known Problems Brother      Social History     Socioeconomic History    Marital status:    Tobacco Use    Smoking status: Some Days     Current packs/day: 0.50     Average packs/day: 0.5 packs/day for 41.0 years (20.5 ttl pk-yrs)     Types: Cigarettes    Smokeless tobacco: Never   Substance and Sexual Activity    Alcohol use: Not Currently     Comment: occasionally    Drug use: No     Medication List with Changes/Refills   Current Medications    ALBUTEROL (PROVENTIL/VENTOLIN HFA) 90  "MCG/ACTUATION INHALER    INHALE 1 TO 2 PUFFS BY MOUTH INTO THE LUNGS EVERY 4-6 HOURS AS NEEDED    AQUANAZ 10- MG TAB    Take 1 tablet by mouth every 6 (six) hours as needed. Hold 3 days prior to surgery    CELECOXIB (CELEBREX) 200 MG CAPSULE    Take 200 mg by mouth.    CETIRIZINE (ZYRTEC) 10 MG TABLET    Take 10 mg by mouth once daily.     CHOLECALCIFEROL, VITAMIN D3, 1,250 MCG (50,000 UNIT) CAPSULE    Take 50,000 Units by mouth every 7 days.    CYCLOBENZAPRINE (FLEXERIL) 10 MG TABLET    Take 10 mg by mouth 3 (three) times daily as needed.     ERGOCALCIFEROL (ERGOCALCIFEROL) 50,000 UNIT CAP    Take 50,000 Units by mouth every 7 days.     FLUTICASONE (FLONASE) 50 MCG/ACTUATION NASAL SPRAY    2 sprays by Each Nare route once daily.    GABAPENTIN (NEURONTIN) 300 MG CAPSULE    Take 300 mg by mouth nightly as needed.     GABAPENTIN ENACARBIL 600 MG TBSR    Take 600 mg by mouth.    HYDROCHLOROTHIAZIDE (HYDRODIURIL) 25 MG TABLET    Take 25 mg by mouth once daily.    LISINOPRIL-HYDROCHLOROTHIAZIDE (PRINZIDE,ZESTORETIC) 20-12.5 MG PER TABLET    Take by mouth.    MELOXICAM (MOBIC) 15 MG TABLET    Take 15 mg by mouth once daily. Hold 4 days prior to surgery    OXYCODONE-ACETAMINOPHEN (PERCOCET)  MG PER TABLET    Take 1 tablet by mouth every 6 (six) hours as needed for Pain.    OXYCODONE-ACETAMINOPHEN (PERCOCET) 5-325 MG PER TABLET    Take 1 tablet by mouth 2 (two) times daily.     PANTOPRAZOLE (PROTONIX) 40 MG TABLET    Take 40 mg by mouth 2 (two) times daily.    TRIAMTERENE-HYDROCHLOROTHIAZIDE 37.5-25 MG (DYAZIDE) 37.5-25 MG PER CAPSULE    Dyazide Take capsule 1 time per day No date recorded capsule 1 time per day No route recorded No set duration recorded No set duration amount recorded suspended 37.5-25 mg     Review of patient's allergies indicates:   Allergen Reactions    Codeine Palpitations       OBJECTIVE:     Physical exam:    Vitals:    11/16/23 1126   Weight: 74.8 kg (164 lb 14.5 oz)   Height: 5' 7" " (1.702 m)   PainSc:   5   PainLoc: Hand     Vital signs are stable, patient is afebrile.  Patient is well dressed and well groomed, no acute distress.  Alert and oriented to person, place, and time.    Left UE:  Incision is clean, dry, and intact x2.   There is no erythema or exudate. There is no sign of any infection.   Mild swelling and tenderness to elbow incision  She is NVI.   Sutures in place.   2+ pulses noted.  Cap refill <2 seconds  Motor intact to hand    ASSESSMENT         Encounter Diagnoses   Name Primary?    S/P carpal tunnel release Yes    S/P cubital tunnel release             14 days status post RELEASE, CARPAL TUNNEL (Left)  RELEASE, CUBITAL TUNNEL (Left)  TRANSPOSITION, NERVE, ULNAR (Left) anterior subcutaneous    PLAN:           Alea was seen today for post-op evaluation and post-op evaluation.    Diagnoses and all orders for this visit:    S/P carpal tunnel release    S/P cubital tunnel release      - PO instruction reviewed and provided to patient  - The incision x2 was cleaned with hydrogen peroxide. Sutures removed with no difficulty. Steri-Strips applied.   - Patient may use brace as needed for symptomatic relief.    - Patient should notify the office of any signs or symptoms of infection including fevers, erythema, purulent drainage, increasing pain.    - Follow up PRN     POST OPERATIVE VISIT INSTRUCTIONS - Visit #2    1. No soaking the incision for at least 7-10 days. You may get it wet in the shower and use regular soap.    2. To avoid a hard, painful scar, we recommend you use Mederma and/or Silicone Scar patches. You may also use Cocoa Butter, Vitamin E oil, Coconut oil, etc.    You may start this 5-7 days after stitches are removed and when wound is completely closed.    - Mederma scar cream: scar massage over incision 1-2 times per day. You may use topical lotion or Vitamin E oil. Massage an additional few times a day to help the scar become soft and less sensitive.    - Silicone  Scar Patches: cut and place over the incision, then massage over the patch to help with scarring and sensitivity. Can be reused up to 10 days if you rinse it clean, let it dry out, then reapply.    Brands: Mepiform Silicone Scar Sheets (recommended), Scar Away, [employer] brand or generic pharmacy brand ([facility], [facility], [facility])    3. Continue range of motion exercises as instructed at todays visit.    4. You may take Tylenol 500mg and/or Ibuprofen 400mg every 4-6 hours with food for pain as tolerated as long as you do not have an allergy or medical condition that prevents you from taking them.    5. Therapy recommended: none at this time    6. Immobilization: brace as needed    7. Lifting restrictions: start light at about 10lb and increase gradually as tolerated    8. Follow up: prn Randi Seneca, PA-C [facility]

## 2023-11-16 ENCOUNTER — OFFICE VISIT (OUTPATIENT)
Dept: ORTHOPEDICS | Facility: CLINIC | Age: 68
End: 2023-11-16
Payer: MEDICARE

## 2023-11-16 VITALS — BODY MASS INDEX: 25.88 KG/M2 | WEIGHT: 164.88 LBS | HEIGHT: 67 IN

## 2023-11-16 DIAGNOSIS — Z98.890 S/P CUBITAL TUNNEL RELEASE: ICD-10-CM

## 2023-11-16 DIAGNOSIS — Z98.890 S/P CARPAL TUNNEL RELEASE: Primary | ICD-10-CM

## 2023-11-16 PROCEDURE — 99999 PR PBB SHADOW E&M-EST. PATIENT-LVL III: ICD-10-PCS | Mod: PBBFAC,,,

## 2023-11-16 PROCEDURE — 3288F PR FALLS RISK ASSESSMENT DOCUMENTED: ICD-10-PCS | Mod: CPTII,S$GLB,,

## 2023-11-16 PROCEDURE — 99024 PR POST-OP FOLLOW-UP VISIT: ICD-10-PCS | Mod: S$GLB,,,

## 2023-11-16 PROCEDURE — 1159F MED LIST DOCD IN RCRD: CPT | Mod: CPTII,S$GLB,,

## 2023-11-16 PROCEDURE — 1101F PT FALLS ASSESS-DOCD LE1/YR: CPT | Mod: CPTII,S$GLB,,

## 2023-11-16 PROCEDURE — 1125F AMNT PAIN NOTED PAIN PRSNT: CPT | Mod: CPTII,S$GLB,,

## 2023-11-16 PROCEDURE — 1125F PR PAIN SEVERITY QUANTIFIED, PAIN PRESENT: ICD-10-PCS | Mod: CPTII,S$GLB,,

## 2023-11-16 PROCEDURE — 3288F FALL RISK ASSESSMENT DOCD: CPT | Mod: CPTII,S$GLB,,

## 2023-11-16 PROCEDURE — 99024 POSTOP FOLLOW-UP VISIT: CPT | Mod: S$GLB,,,

## 2023-11-16 PROCEDURE — 1101F PR PT FALLS ASSESS DOC 0-1 FALLS W/OUT INJ PAST YR: ICD-10-PCS | Mod: CPTII,S$GLB,,

## 2023-11-16 PROCEDURE — 1159F PR MEDICATION LIST DOCUMENTED IN MEDICAL RECORD: ICD-10-PCS | Mod: CPTII,S$GLB,,

## 2023-11-16 PROCEDURE — 99999 PR PBB SHADOW E&M-EST. PATIENT-LVL III: CPT | Mod: PBBFAC,,,

## 2024-04-19 ENCOUNTER — OFFICE VISIT (OUTPATIENT)
Dept: SPORTS MEDICINE | Facility: CLINIC | Age: 69
End: 2024-04-19
Payer: MEDICARE

## 2024-04-19 DIAGNOSIS — M25.462 EFFUSION OF LEFT KNEE: ICD-10-CM

## 2024-04-19 DIAGNOSIS — M17.11 PRIMARY OSTEOARTHRITIS OF RIGHT KNEE: Primary | ICD-10-CM

## 2024-04-19 LAB
APPEARANCE FLD: NORMAL
BASOPHILS NFR FLD MANUAL: 0 %
BODY FLD TYPE: NORMAL
BODY FLD TYPE: NORMAL
BODY FLUID COMMENTS: NORMAL
COLOR FLD: YELLOW
CRYSTALS FLD MICRO: NEGATIVE
EOSINOPHIL NFR FLD MANUAL: 0 %
LYMPHOCYTES NFR FLD MANUAL: 17 %
MESOTHL CELL NFR FLD MANUAL: 7 %
MONOS+MACROS NFR FLD MANUAL: 44 %
NEUTROPHILS NFR FLD MANUAL: 32 %
OTHER CELLS FLD MANUAL: 0 %
WBC # FLD: 840 /CU MM

## 2024-04-19 PROCEDURE — 89060 EXAM SYNOVIAL FLUID CRYSTALS: CPT | Performed by: STUDENT IN AN ORGANIZED HEALTH CARE EDUCATION/TRAINING PROGRAM

## 2024-04-19 PROCEDURE — 1159F MED LIST DOCD IN RCRD: CPT | Mod: CPTII,S$GLB,, | Performed by: STUDENT IN AN ORGANIZED HEALTH CARE EDUCATION/TRAINING PROGRAM

## 2024-04-19 PROCEDURE — 99215 OFFICE O/P EST HI 40 MIN: CPT | Mod: 25,S$GLB,, | Performed by: STUDENT IN AN ORGANIZED HEALTH CARE EDUCATION/TRAINING PROGRAM

## 2024-04-19 PROCEDURE — 99999 PR PBB SHADOW E&M-EST. PATIENT-LVL II: CPT | Mod: PBBFAC,,, | Performed by: STUDENT IN AN ORGANIZED HEALTH CARE EDUCATION/TRAINING PROGRAM

## 2024-04-19 PROCEDURE — 20611 DRAIN/INJ JOINT/BURSA W/US: CPT | Mod: 50,S$GLB,, | Performed by: STUDENT IN AN ORGANIZED HEALTH CARE EDUCATION/TRAINING PROGRAM

## 2024-04-19 PROCEDURE — 87075 CULTR BACTERIA EXCEPT BLOOD: CPT | Performed by: STUDENT IN AN ORGANIZED HEALTH CARE EDUCATION/TRAINING PROGRAM

## 2024-04-19 PROCEDURE — 87205 SMEAR GRAM STAIN: CPT | Performed by: STUDENT IN AN ORGANIZED HEALTH CARE EDUCATION/TRAINING PROGRAM

## 2024-04-19 PROCEDURE — 87070 CULTURE OTHR SPECIMN AEROBIC: CPT | Performed by: STUDENT IN AN ORGANIZED HEALTH CARE EDUCATION/TRAINING PROGRAM

## 2024-04-19 PROCEDURE — 1160F RVW MEDS BY RX/DR IN RCRD: CPT | Mod: CPTII,S$GLB,, | Performed by: STUDENT IN AN ORGANIZED HEALTH CARE EDUCATION/TRAINING PROGRAM

## 2024-04-19 PROCEDURE — 89051 BODY FLUID CELL COUNT: CPT | Performed by: STUDENT IN AN ORGANIZED HEALTH CARE EDUCATION/TRAINING PROGRAM

## 2024-04-19 RX ORDER — TRIAMCINOLONE ACETONIDE 40 MG/ML
40 INJECTION, SUSPENSION INTRA-ARTICULAR; INTRAMUSCULAR
Status: DISCONTINUED | OUTPATIENT
Start: 2024-04-19 | End: 2024-04-19 | Stop reason: HOSPADM

## 2024-04-19 RX ADMIN — TRIAMCINOLONE ACETONIDE 40 MG: 40 INJECTION, SUSPENSION INTRA-ARTICULAR; INTRAMUSCULAR at 03:04

## 2024-04-19 NOTE — PROCEDURES
Sports Medicine US - Guidance for Needle Placement    Date/Time: 4/19/2024 3:40 PM    Performed by: Nick Syed MD  Authorized by: Nick Syed MD  Preparation: Patient was prepped and draped in the usual sterile fashion.  Local anesthesia used: no    Anesthesia:  Local anesthesia used: no    Sedation:  Patient sedated: no    Patient tolerance: patient tolerated the procedure well with no immediate complications  Comments: Ultrasound guidance was used for needle localization. Images were saved and stored for documentation. The appropriate structures were visualized. Dynamic visualization of the needle was continuous throughout the procedures and maintained good position.

## 2024-04-19 NOTE — PROCEDURES
Large Joint Aspiration/Injection: bilateral knee    Date/Time: 4/19/2024 3:40 PM    Performed by: Nick Syed MD  Authorized by: Nick Syed MD    Consent Done?:  Yes (Verbal)  Indications:  Arthritis, pain, joint swelling and diagnostic evaluation  Site marked: the procedure site was marked    Timeout: prior to procedure the correct patient, procedure, and site was verified    Prep: patient was prepped and draped in usual sterile fashion    Local anesthetic:  Bupivacaine 0.5% without epinephrine and lidocaine 1% without epinephrine    Details:  Needle Size:  21 G  Ultrasonic Guidance for needle placement?: Yes    Images are saved and documented.  Approach:  Lateral (superior)  Location:  Knee  Laterality:  Bilateral  Site:  Bilateral knee  Medications (Right):  40 mg triamcinolone acetonide 40 mg/mL  Aspirate (Left) amount (mL):  12  Aspirate (Left):  Yellow  Lab (Left): fluid sent for laboratory analysis    Patient tolerance:  Patient tolerated the procedure well with no immediate complications     Ultrasound guidance was used for needle localization. Images were saved and stored for documentation. The appropriate structures were visualized. Dynamic visualization of the needle was continuous throughout the procedures and maintained good position.

## 2024-04-19 NOTE — PROGRESS NOTES
Patient ID: Alea Terrazas  YOB: 1955  MRN: [id number]    Chief Complaint: No chief complaint on file.      Referred By: Monica Leonard PA-C        History of Present Illness: Alea Terrazas is a right-hand dominant 67 y.o. female who presents today with followup  right knee pain She is just coming back off a cruise, she was unable to do a lot of walking while on the cruise.   Pain is worse with activity, she is not interested in surgery, she would like to try conservative treatment for now.. Putting any kind of pressure on right leg hurts worse. She has a hx of a L TKA done at an outside facility. She is complaining of pain and swelling in both knees. Pain 5/10, achy, throbbing.     11/15/2023 Interval History of Present Illness: Alea Terrazas is a right-hand dominant 68 y.o. female who presents today with right knee pain waxing and weaning for years. Pain is worse with activity, she is not interested in surgery, she would like to try conservative treatment for now.. She has a hx of a L TKA done at an outside facility. Pain 8/10, achy, throbbing. Has not tried PT.    The patient is active in none.  Occupation: none      Past Medical History:   Past Medical History:   Diagnosis Date    Anticoagulant long-term use     Anxiety     Arthritis     Carpal tunnel syndrome     Chronic neck and back pain     Hypertension      Past Surgical History:   Procedure Laterality Date    CARPAL TUNNEL RELEASE Left     CARPAL TUNNEL RELEASE Right 09/05/2019    Procedure: RELEASE, CARPAL TUNNEL;  Surgeon: Billy Meyer MD;  Location: [facility] OR;  Service: Orthopedics;  Laterality: Right;  Confirmed anesthesia type with CRNA.    CARPAL TUNNEL RELEASE Left 11/2/2023    Procedure: RELEASE, CARPAL TUNNEL;  Surgeon: Billy Meyer MD;  Location: [facility] OR;  Service: Orthopedics;  Laterality: Left;    CERVICAL FUSION  2016, 2017    HERNIA REPAIR      JOINT REPLACEMENT Left     knee    ROTATOR CUFF REPAIR Right  2015    TRIGGER FINGER RELEASE Right 09/05/2019    Procedure: RELEASE, TRIGGER FINGER;  Surgeon: Billy Meyer MD;  Location: [facility] OR;  Service: Orthopedics;  Laterality: Right;  right thumb   Confirmed anesthesia type with CRNA.    ULNAR NERVE TRANSPOSITION Left 11/2/2023    Procedure: TRANSPOSITION, NERVE, ULNAR;  Surgeon: Billy Meyer MD;  Location: [facility] OR;  Service: Orthopedics;  Laterality: Left;    ULNAR TUNNEL RELEASE Right 10/01/2020    Procedure: RELEASE, CUBITAL TUNNEL;  Surgeon: Billy Meyer MD;  Location: [facility] OR;  Service: Orthopedics;  Laterality: Right;  lower arm (near elbow)  Per MARK Kent CRNA, general anesthesia used.  Per surgeon, anterior transposition ulnar nerve was NOT peformed.      ULNAR TUNNEL RELEASE Left 11/2/2023    Procedure: RELEASE, CUBITAL TUNNEL;  Surgeon: Billy Meyer MD;  Location: [facility];  Service: Orthopedics;  Laterality: Left;     Family History   Problem Relation Name Age of Onset    Cancer Father      Diabetes Father      Heart failure Father      COPD Mother  41    Kidney failure Mother      No Known Problems Brother      No Known Problems Brother       Social History     Socioeconomic History    Marital status:    Tobacco Use    Smoking status: Some Days     Current packs/day: 0.50     Average packs/day: 0.5 packs/day for 41.0 years (20.5 ttl pk-yrs)     Types: Cigarettes    Smokeless tobacco: Never   Substance and Sexual Activity    Alcohol use: Not Currently     Comment: occasionally    Drug use: No     Medication List with Changes/Refills   Current Medications    ALBUTEROL (PROVENTIL/VENTOLIN HFA) 90 MCG/ACTUATION INHALER    INHALE 1 TO 2 PUFFS BY MOUTH INTO THE LUNGS EVERY 4-6 HOURS AS NEEDED    AQUANAZ 10- MG TAB    Take 1 tablet by mouth every 6 (six) hours as needed. Hold 3 days prior to surgery    CELECOXIB (CELEBREX) 200 MG CAPSULE    Take 200 mg by mouth.    CETIRIZINE (ZYRTEC) 10 MG TABLET    Take 10 mg  by mouth once daily.     CHOLECALCIFEROL, VITAMIN D3, 1,250 MCG (50,000 UNIT) CAPSULE    Take 50,000 Units by mouth every 7 days.    CYCLOBENZAPRINE (FLEXERIL) 10 MG TABLET    Take 10 mg by mouth 3 (three) times daily as needed.     ERGOCALCIFEROL (ERGOCALCIFEROL) 50,000 UNIT CAP    Take 50,000 Units by mouth every 7 days.     FLUTICASONE (FLONASE) 50 MCG/ACTUATION NASAL SPRAY    2 sprays by Each Nare route once daily.    GABAPENTIN (NEURONTIN) 300 MG CAPSULE    Take 300 mg by mouth nightly as needed.     GABAPENTIN ENACARBIL 600 MG TBSR    Take 600 mg by mouth.    HYDROCHLOROTHIAZIDE (HYDRODIURIL) 25 MG TABLET    Take 25 mg by mouth once daily.    LISINOPRIL-HYDROCHLOROTHIAZIDE (PRINZIDE,ZESTORETIC) 20-12.5 MG PER TABLET    Take by mouth.    MELOXICAM (MOBIC) 15 MG TABLET    Take 15 mg by mouth once daily. Hold 4 days prior to surgery    OXYCODONE-ACETAMINOPHEN (PERCOCET)  MG PER TABLET    Take 1 tablet by mouth every 6 (six) hours as needed for Pain.    OXYCODONE-ACETAMINOPHEN (PERCOCET) 5-325 MG PER TABLET    Take 1 tablet by mouth 2 (two) times daily.     PANTOPRAZOLE (PROTONIX) 40 MG TABLET    Take 40 mg by mouth 2 (two) times daily.    TRIAMTERENE-HYDROCHLOROTHIAZIDE 37.5-25 MG (DYAZIDE) 37.5-25 MG PER CAPSULE    Dyazide Take capsule 1 time per day No date recorded capsule 1 time per day No route recorded No set duration recorded No set duration amount recorded suspended 37.5-25 mg     Review of patient's allergies indicates:   Allergen Reactions    Codeine Palpitations       Physical Exam:   There is no height or weight on file to calculate BMI.    GENERAL: Well appearing, in no acute distress.  HEAD: Normocephalic and atraumatic.  ENT: External ears and nose grossly normal.  EYES: EOMI bilaterally  PULMONARY: Respirations are grossly even and non-labored.  NEURO: Awake, alert, and oriented x 3.  SKIN: No obvious rashes appreciated.  PSYCH: Mood & affect are appropriate.    Detailed MSK exam:     Right  knee exam:   -ROM: extension 0, flexion 130  -TTP: Medial joint line and Lateral joint line  -effusion: present  -Patellar apprehension negative  -Mason test negative  -stable to varus and valgus stress tests  -Lachman test negative, anterior drawer test negative, posterior drawer test negative        Imaging:  X-ray Knee Ortho Right with Flexion  Narrative: EXAM:  XR KNEE ORTHO RIGHT WITH FLEXION    CLINICAL HISTORY: Right knee pain.    FINDINGS: AP Standing, Flexion and sunrise view bilaterally and lateral view of the right knee.  No comparison.     Mild medial compartment narrowing and sclerosis.  Left knee replacement without dislocation or loosening.  No fracture is identified.  Joint alignment is anatomic.  No significant joint effusion is identified.  The joint spaces appear relatively well maintained.  Impression:  No acute radiographic abnormality of the right knee.    Finalized on: 11/15/2023 8:53 AM By:  Chandler Jolly MD  [facility]# [id number]      2023-11-15 08:55:[id number]    [facility]        Relevant imaging results were reviewed and interpreted by me and per my read shows mild medial compartment joint space narrowing right knee.  This was discussed with the patient and / or family today.     Assessment:  Alea Terrazas is a 68 y.o. female following up for chronic right knee pain. Has swelling both knees today, left greater than right.   Plan: Steroid injection right knee given today (see separate procedure note for details). We discussed the proper protocols after the injection such as no submerging pools, baths tubs, or hot tubs for 24 hr.  Showering is okay today.  We also discussed that blood sugars can be elevated after an injection and asked patient to properly checked her sugars over the next few days and contact their PCP if there are any concerns.  We discussed red flags such as fevers, chills, red, warm, tender joint at the area of injection to please seek medical care immediately.   Consider right knee  gel injection if not improving. Consider iovera procedure if not improving. Continue conservative management for pain. 12 cc aspirated from left knee and sent for analysis.   Follow up as needed. All questions answered.      Primary osteoarthritis of right knee  -     Sports Medicine US - Guidance for Needle Placement  -     Large Joint Aspiration/Injection: bilateral knee    Effusion of left knee  -     Sports Medicine US - Guidance for Needle Placement  -     Large Joint Aspiration/Injection: bilateral knee  -     WBC & Diff,Body Fluid Joint Fluid, Left Knee  -     Body fluid crystal Joint Fluid, Left Knee  -     CULTURE, ANAEROBE  -     CULTURE, FLUID  (AEROBIC)      Ultrasound guidance was used for needle localization. Images were saved and stored for documentation. The appropriate structures were visualized. Dynamic visualization of the needle was continuous throughout the procedures and maintained good position.        MEDICAL NECESSITY FOR VISCOSUPPLEMENTATION: After thorough evaluation of the patient, I have determined that visco-supplementation is medically necessary. The patient has painful degenerative changes of the knee with failure of conservative treatments including lifestyle modifications and rehabilitation exercises.  Oral analgesis/NSAIDs have not adequately controlled symptoms and there is radiographic evidence of Kellgren Elliott grade 2 or greater osteoarthritic changes, or in lack of radiographic evidence, there is arthroscopic or other evidence of chondrosis.      I spent a total of 40 minutes on the day of the visit.  This includes face to face time and non-face to face time preparing to see the patient (eg, review of tests), obtaining and/or reviewing separately obtained history, documenting clinical information in the electronic or other health record, independently interpreting results and communicating results to the patient/family/caregiver, or care coordinator.      A copy of today's  visit note has been sent to the referring provider.     Electronically signed:  Nick Syed MD, MPH  04/19/2024  9:18 AM

## 2024-04-19 NOTE — PATIENT INSTRUCTIONS
Assessment:  Alea Terrazas is a 68 y.o. female No chief complaint on file.      No diagnosis found.     Plan:  Ultrasound guided cortisone injection with possible aspiration to the right knee  Ultrasound guided aspiration of the left knee  We discussed the proper protocols after the injection such as no submerging pools, baths tubs, or hot tubs for 24 hr.  Showering is okay today.  We also discussed that blood sugars can be elevated after an injection and asked patient to properly checked her sugars over the next few days and contact their PCP if there are any concerns.  We discussed red flags such as fevers, chills, red, warm, tender joint at the area of injection to please seek medical care immediately.    Patient may ice every 2 hours for 15 minutes as needed to control pain and swelling.   Follow up as needed      Follow-up: as needed.    Thank you for choosing [facility] and Dr. Nick Syed for your orthopedic & sports medicine care. It is our goal to provide you with exceptional care that will help keep you healthy, active, and get you back in the game.    Please do not hesitate to reach out to us via email, phone, or MyChart with any questions, concerns, or feedback.    If you felt that you received exemplary care today, please consider leaving us feedback on [employer] at:  [email or web address]    If you are experiencing pain/discomfort ,or have questions after 5pm and would like to be connected to the [facility] on-call team, please call this number and specify which Sports Medicine provider is treating you: [phone]

## 2024-04-20 LAB — PATH INTERP FLD-IMP: NORMAL

## 2024-04-24 LAB
BACTERIA FLD AEROBE CULT: NO GROWTH
GRAM STN SPEC: NORMAL
GRAM STN SPEC: NORMAL

## 2024-04-29 LAB — BACTERIA SPEC ANAEROBE CULT: NORMAL

## 2024-08-09 ENCOUNTER — OFFICE VISIT (OUTPATIENT)
Dept: ORTHOPEDICS | Facility: CLINIC | Age: 69
End: 2024-08-09
Payer: MEDICARE

## 2024-08-09 VITALS — WEIGHT: 164.88 LBS | BODY MASS INDEX: 25.88 KG/M2 | HEIGHT: 67 IN

## 2024-08-09 DIAGNOSIS — G56.01 RIGHT CARPAL TUNNEL SYNDROME: Primary | ICD-10-CM

## 2024-08-09 DIAGNOSIS — G56.03 CARPAL TUNNEL SYNDROME ON BOTH SIDES: ICD-10-CM

## 2024-08-09 PROCEDURE — 99999 PR PBB SHADOW E&M-EST. PATIENT-LVL III: CPT | Mod: PBBFAC,,, | Performed by: ORTHOPAEDIC SURGERY

## 2024-08-09 RX ORDER — TRIAMCINOLONE ACETONIDE 40 MG/ML
40 INJECTION, SUSPENSION INTRA-ARTICULAR; INTRAMUSCULAR
Status: DISCONTINUED | OUTPATIENT
Start: 2024-08-09 | End: 2024-08-09 | Stop reason: HOSPADM

## 2024-08-09 RX ADMIN — TRIAMCINOLONE ACETONIDE 40 MG: 40 INJECTION, SUSPENSION INTRA-ARTICULAR; INTRAMUSCULAR at 08:08

## 2024-11-13 ENCOUNTER — OFFICE VISIT (OUTPATIENT)
Dept: SPORTS MEDICINE | Facility: CLINIC | Age: 69
End: 2024-11-13
Payer: MEDICARE

## 2024-11-13 VITALS — HEIGHT: 67 IN | BODY MASS INDEX: 26.53 KG/M2 | WEIGHT: 169 LBS

## 2024-11-13 DIAGNOSIS — M17.11 PRIMARY OSTEOARTHRITIS OF RIGHT KNEE: Primary | ICD-10-CM

## 2024-11-13 PROCEDURE — 99999 PR PBB SHADOW E&M-EST. PATIENT-LVL IV: CPT | Mod: PBBFAC,,, | Performed by: STUDENT IN AN ORGANIZED HEALTH CARE EDUCATION/TRAINING PROGRAM

## 2024-11-13 RX ORDER — TRIAMCINOLONE ACETONIDE 40 MG/ML
40 INJECTION, SUSPENSION INTRA-ARTICULAR; INTRAMUSCULAR
Status: DISCONTINUED | OUTPATIENT
Start: 2024-11-13 | End: 2024-11-13 | Stop reason: HOSPADM

## 2024-11-13 RX ADMIN — TRIAMCINOLONE ACETONIDE 40 MG: 40 INJECTION, SUSPENSION INTRA-ARTICULAR; INTRAMUSCULAR at 12:11

## 2024-11-13 NOTE — PATIENT INSTRUCTIONS
Assessment:  Alea Terrazas is a 68 y.o. female   Chief Complaint   Patient presents with    Right Knee - Pain       Encounter Diagnosis   Name Primary?    Primary osteoarthritis of right knee Yes        Plan:  Ultrasound guided cortisone injection, with possible aspiration, of the right knee.  We discussed the proper protocols after the injection such as no submerging pools, baths tubs, or hot tubs for 24 hr.  Showering is okay today.  We also discussed that blood sugars can be elevated after an injection and asked patient to properly checked her sugars over the next few days and contact their PCP if there are any concerns.  We discussed red flags such as fevers, chills, red, warm, tender joint at the area of injection to please seek medical care immediately.    Follow up as needed    Follow-up: as needed.    Thank you for choosing [facility] and Dr. Nick Syed for your orthopedic & sports medicine care. It is our goal to provide you with exceptional care that will help keep you healthy, active, and get you back in the game.    Please do not hesitate to reach out to us via email, phone, or MyChart with any questions, concerns, or feedback.    If you felt that you received exemplary care today, please consider leaving us feedback on [facility] [employer] [facility] at:  [email or web address]    If you are experiencing pain/discomfort ,or have questions after 5pm and would like to be connected to the [facility] on-call team, please call this number and specify which Sports Medicine provider is treating you: [phone]

## 2024-11-13 NOTE — PROCEDURES
Sports Medicine US - Guidance for Needle Placement    Date/Time: 11/13/2024 12:40 PM    Performed by: Nick Syed MD  Authorized by: Nick Syed MD  Preparation: Patient was prepped and draped in the usual sterile fashion.  Local anesthesia used: no    Anesthesia:  Local anesthesia used: no    Sedation:  Patient sedated: no    Patient tolerance: patient tolerated the procedure well with no immediate complications  Comments: Ultrasound guidance was used for needle localization. Images were saved and stored for documentation. The appropriate structures were visualized. Dynamic visualization of the needle was continuous throughout the procedures and maintained good position.

## 2024-11-13 NOTE — PROGRESS NOTES
Patient ID: Alea Terrazas  YOB: 1955  MRN: [id number]    Chief Complaint: Pain of the Right Knee      History of Present Illness: Alea Terrazas is a right-hand dominant 68 y.o. female who presents today with right knee pain.  Patient last seen in clinic on 4/19/2024 and received CSI to the right knee.  Reports that injection was helpful and that pain returned approximately one month ago. She received back surgery on October 16 and had some complications following this.  Reports knee pain started around that time.  Currently rates her pain at a 5/10 and describes the pain as a constant pain.  Has been using ice, Meloxicam and currently taking Percocet.  She is interested in repeating CSI today.     4/19/2024 Interval History of Present Illness: Alea Terrazas is a right-hand dominant 67 y.o. female who presents today with followup  right knee pain She is just coming back off a cruise, she was unable to do a lot of walking while on the cruise.   Pain is worse with activity, she is not interested in surgery, she would like to try conservative treatment for now.. Putting any kind of pressure on right leg hurts worse. She has a hx of a L TKA done at an outside facility. She is complaining of pain and swelling in both knees. Pain 5/10, achy, throbbing.     11/15/2023 Interval History of Present Illness: Alea Terrazas is a right-hand dominant 68 y.o. female who presents today with right knee pain waxing and weaning for years. Pain is worse with activity, she is not interested in surgery, she would like to try conservative treatment for now.. She has a hx of a L TKA done at an outside facility. Pain 8/10, achy, throbbing. Has not tried PT.     The patient is active in none.  Occupation:       Past Medical History:   Past Medical History:   Diagnosis Date    Anticoagulant long-term use     Anxiety     Arthritis     Carpal tunnel syndrome     Chronic neck and back pain     Hypertension      Past Surgical  History:   Procedure Laterality Date    CARPAL TUNNEL RELEASE Left     CARPAL TUNNEL RELEASE Right 09/05/2019    Procedure: RELEASE, CARPAL TUNNEL;  Surgeon: Billy Meyer MD;  Location: [facility];  Service: Orthopedics;  Laterality: Right;  Confirmed anesthesia type with CRNA.    CARPAL TUNNEL RELEASE Left 11/2/2023    Procedure: RELEASE, CARPAL TUNNEL;  Surgeon: Billy Meyer MD;  Location: [facility];  Service: Orthopedics;  Laterality: Left;    CERVICAL FUSION  2016, 2017    HERNIA REPAIR      JOINT REPLACEMENT Left     knee    ROTATOR CUFF REPAIR Right 2015    TRIGGER FINGER RELEASE Right 09/05/2019    Procedure: RELEASE, TRIGGER FINGER;  Surgeon: Billy Meyer MD;  Location: [facility];  Service: Orthopedics;  Laterality: Right;  right thumb   Confirmed anesthesia type with CRNA.    ULNAR NERVE TRANSPOSITION Left 11/2/2023    Procedure: TRANSPOSITION, NERVE, ULNAR;  Surgeon: Billy Meyer MD;  Location: [facility];  Service: Orthopedics;  Laterality: Left;    ULNAR TUNNEL RELEASE Right 10/01/2020    Procedure: RELEASE, CUBITAL TUNNEL;  Surgeon: Billy Meyer MD;  Location: [facility];  Service: Orthopedics;  Laterality: Right;  lower arm (near elbow)  Per MARK Kent CRNA, general anesthesia used.  Per surgeon, anterior transposition ulnar nerve was NOT peformed.      ULNAR TUNNEL RELEASE Left 11/2/2023    Procedure: RELEASE, CUBITAL TUNNEL;  Surgeon: Billy Meyer MD;  Location: [facility];  Service: Orthopedics;  Laterality: Left;     Family History   Problem Relation Name Age of Onset    Cancer Father      Diabetes Father      Heart failure Father      COPD Mother  41    Kidney failure Mother      No Known Problems Brother      No Known Problems Brother       Social History     Socioeconomic History    Marital status:    Tobacco Use    Smoking status: Some Days     Current packs/day: 0.50     Average packs/day: 0.5 packs/day for 41.0 years (20.5 ttl pk-yrs)      Types: Cigarettes    Smokeless tobacco: Never   Substance and Sexual Activity    Alcohol use: Not Currently     Comment: occasionally    Drug use: No     Social Drivers of Health     Financial Resource Strain: Medium Risk (11/12/2024)    Overall Financial Resource Strain (CARDIA)     Difficulty of Paying Living Expenses: Somewhat hard   Food Insecurity: Food Insecurity Present (11/12/2024)    Hunger Vital Sign     Worried About Running Out of Food in the Last Year: Sometimes true     Ran Out of Food in the Last Year: Sometimes true   Physical Activity: Insufficiently Active (11/12/2024)    Exercise Vital Sign     Days of Exercise per Week: 5 days     Minutes of Exercise per Session: 20 min   Stress: No Stress Concern Present (11/12/2024)    [employer] [place] [employer] - Occupational Stress Questionnaire     Feeling of Stress : Not at all   Housing Stability: High Risk (11/12/2024)    Housing Stability Vital Sign     Unable to Pay for Housing in the Last Year: Yes     Medication List with Changes/Refills   Current Medications    ALBUTEROL (PROVENTIL/VENTOLIN HFA) 90 MCG/ACTUATION INHALER    INHALE 1 TO 2 PUFFS BY MOUTH INTO THE LUNGS EVERY 4-6 HOURS AS NEEDED    AQUANAZ 10- MG TAB    Take 1 tablet by mouth every 6 (six) hours as needed. Hold 3 days prior to surgery    CELECOXIB (CELEBREX) 200 MG CAPSULE    Take 200 mg by mouth.    CETIRIZINE (ZYRTEC) 10 MG TABLET    Take 10 mg by mouth once daily.     CHOLECALCIFEROL, VITAMIN D3, 1,250 MCG (50,000 UNIT) CAPSULE    Take 50,000 Units by mouth every 7 days.    CYCLOBENZAPRINE (FLEXERIL) 10 MG TABLET    Take 10 mg by mouth 3 (three) times daily as needed.     ERGOCALCIFEROL (ERGOCALCIFEROL) 50,000 UNIT CAP    Take 50,000 Units by mouth every 7 days.     FLUTICASONE (FLONASE) 50 MCG/ACTUATION NASAL SPRAY    2 sprays by Each Nare route once daily.    GABAPENTIN (NEURONTIN) 300 MG CAPSULE    Take 300 mg by mouth nightly as needed.     GABAPENTIN ENACARBIL  600 MG TBSR    Take 600 mg by mouth.    HYDROCHLOROTHIAZIDE (HYDRODIURIL) 25 MG TABLET    Take 25 mg by mouth once daily.    LISINOPRIL-HYDROCHLOROTHIAZIDE (PRINZIDE,ZESTORETIC) 20-12.5 MG PER TABLET    Take by mouth.    MELOXICAM (MOBIC) 15 MG TABLET    Take 15 mg by mouth once daily. Hold 4 days prior to surgery    OXYCODONE-ACETAMINOPHEN (PERCOCET)  MG PER TABLET    Take 1 tablet by mouth every 6 (six) hours as needed for Pain.    OXYCODONE-ACETAMINOPHEN (PERCOCET) 5-325 MG PER TABLET    Take 1 tablet by mouth 2 (two) times daily.     PANTOPRAZOLE (PROTONIX) 40 MG TABLET    Take 40 mg by mouth 2 (two) times daily.    TRIAMTERENE-HYDROCHLOROTHIAZIDE 37.5-25 MG (DYAZIDE) 37.5-25 MG PER CAPSULE    Dyazide Take capsule 1 time per day No date recorded capsule 1 time per day No route recorded No set duration recorded No set duration amount recorded suspended 37.5-25 mg     Review of patient's allergies indicates:   Allergen Reactions    Iodine Other (See Comments)     Severe Headache    Codeine Palpitations       Physical Exam:   Body mass index is 26.47 kg/m².    GENERAL: Well appearing, in no acute distress.  HEAD: Normocephalic and atraumatic.  ENT: External ears and nose grossly normal.  EYES: EOMI bilaterally  PULMONARY: Respirations are grossly even and non-labored.  NEURO: Awake, alert, and oriented x 3.  SKIN: No obvious rashes appreciated.  PSYCH: Mood & affect are appropriate.    Detailed MSK exam:     Right knee exam:   -ROM: extension 0, flexion 130  -TTP: Medial joint line and Lateral joint line  -effusion: none  -Patellar apprehension negative  -Mason test negative  -stable to varus and valgus stress tests  -Lachman test negative, anterior drawer test negative, posterior drawer test negative        Imaging:  Sports Medicine US - Guidance for Needle Placement  Nick Syed MD     4/19/2024  4:39 PM  Sports Medicine US - Guidance for Needle Placement    Date/Time: 4/19/2024 3:40  PM    Performed by: Nick Syed MD  Authorized by: Nick Syed MD  Preparation: Patient was prepped and   draped in the usual sterile fashion.  Local anesthesia used: no    Anesthesia:  Local anesthesia used: no    Sedation:  Patient sedated: no    Patient tolerance: patient tolerated the procedure well with no immediate   complications  Comments: Ultrasound guidance was used for needle localization. Images   were saved and stored for documentation. The appropriate structures were   visualized. Dynamic visualization of the needle was continuous throughout   the procedures and maintained good position.         Relevant imaging results were reviewed and interpreted by me and per my read shows mild medial compartment joint space narrowing right knee.  This was discussed with the patient and / or family today.     Assessment:  Alea Terrazas is a 68 y.o. female following up for right knee pain. Steroid injection has worked very well for her and is interested in repeating again today.   Plan: Steroid injection given today (see separate procedure note for details). We discussed the proper protocols after the injection such as no submerging pools, baths tubs, or hot tubs for 24 hr.  Showering is okay today.  We also discussed that blood sugars can be elevated after an injection and asked patient to properly checked her sugars over the next few days and contact their PCP if there are any concerns.  We discussed red flags such as fevers, chills, red, warm, tender joint at the area of injection to please seek medical care immediately.   Consider gel injection if not improving. Continue conservative management for pain.   Follow up as needed. All questions answered.     Primary osteoarthritis of right knee  -     Sports Medicine US - Guidance for Needle Placement  -     Large Joint Aspiration/Injection: R knee         Ultrasound guidance was used for needle localization. Images were saved and stored for  documentation. The appropriate structures were visualized. Dynamic visualization of the needle was continuous throughout the procedures and maintained good position.     MEDICAL NECESSITY FOR VISCOSUPPLEMENTATION: After thorough evaluation of the patient, I have determined that visco-supplementation is medically necessary. The patient has painful degenerative changes of the knee with failure of conservative treatments including lifestyle modifications and rehabilitation exercises.  Oral analgesis/NSAIDs have not adequately controlled symptoms and there is radiographic evidence of Kellgren Elliott grade 2 or greater osteoarthritic changes, or in lack of radiographic evidence, there is arthroscopic or other evidence of chondrosis.      Electronically signed:  Nick Syed MD, MPH  11/13/2024  1:02 PM

## 2024-11-13 NOTE — PROCEDURES
Large Joint Aspiration/Injection: R knee    Date/Time: 11/13/2024 12:40 PM    Performed by: Nick Syed MD  Authorized by: Nick Syed MD    Consent Done?:  Yes (Verbal)  Indications:  Arthritis and pain  Site marked: the procedure site was marked    Timeout: prior to procedure the correct patient, procedure, and site was verified    Prep: patient was prepped and draped in usual sterile fashion    Local anesthetic:  Bupivacaine 0.5% without epinephrine and lidocaine 1% without epinephrine    Details:  Needle Size:  21 G  Ultrasonic Guidance for needle placement?: Yes    Images are saved and documented.  Approach:  Lateral (superior)  Location:  Knee  Site:  R knee  Medications:  40 mg triamcinolone acetonide 40 mg/mL  Patient tolerance:  Patient tolerated the procedure well with no immediate complications     Ultrasound guidance was used for needle localization. Images were saved and stored for documentation. The appropriate structures were visualized. Dynamic visualization of the needle was continuous throughout the procedures and maintained good position.

## 2025-02-19 DIAGNOSIS — M25.561 RIGHT KNEE PAIN, UNSPECIFIED CHRONICITY: Primary | ICD-10-CM

## 2025-02-20 ENCOUNTER — HOSPITAL ENCOUNTER (OUTPATIENT)
Dept: RADIOLOGY | Facility: HOSPITAL | Age: 70
Discharge: HOME | End: 2025-02-20
Attending: STUDENT IN AN ORGANIZED HEALTH CARE EDUCATION/TRAINING PROGRAM
Payer: MEDICARE

## 2025-02-20 ENCOUNTER — OFFICE VISIT (OUTPATIENT)
Dept: SPORTS MEDICINE | Facility: CLINIC | Age: 70
End: 2025-02-20
Payer: MEDICARE

## 2025-02-20 VITALS — BODY MASS INDEX: 26.53 KG/M2 | HEIGHT: 67 IN | WEIGHT: 169.06 LBS

## 2025-02-20 DIAGNOSIS — M25.561 RIGHT KNEE PAIN, UNSPECIFIED CHRONICITY: ICD-10-CM

## 2025-02-20 DIAGNOSIS — M17.11 PRIMARY OSTEOARTHRITIS OF RIGHT KNEE: Primary | ICD-10-CM

## 2025-02-20 PROCEDURE — 73564 X-RAY EXAM KNEE 4 OR MORE: CPT | Mod: TC,RT

## 2025-02-20 PROCEDURE — 73564 X-RAY EXAM KNEE 4 OR MORE: CPT | Mod: 26,RT,, | Performed by: RADIOLOGY

## 2025-02-20 PROCEDURE — 99999 PR PBB SHADOW E&M-EST. PATIENT-LVL III: CPT | Mod: PBBFAC,,, | Performed by: STUDENT IN AN ORGANIZED HEALTH CARE EDUCATION/TRAINING PROGRAM

## 2025-02-20 PROCEDURE — 73562 X-RAY EXAM OF KNEE 3: CPT | Mod: 26,59,LT, | Performed by: RADIOLOGY

## 2025-02-20 RX ORDER — TRIAMCINOLONE ACETONIDE 40 MG/ML
40 INJECTION, SUSPENSION INTRA-ARTICULAR; INTRAMUSCULAR
Status: DISCONTINUED | OUTPATIENT
Start: 2025-02-20 | End: 2025-02-20 | Stop reason: HOSPADM

## 2025-02-20 RX ADMIN — TRIAMCINOLONE ACETONIDE 40 MG: 40 INJECTION, SUSPENSION INTRA-ARTICULAR; INTRAMUSCULAR at 01:02

## 2025-02-20 NOTE — PATIENT INSTRUCTIONS
Assessment:  Alea Terrazas is a 69 y.o. female   Chief Complaint   Patient presents with    Right Knee - Pain       No diagnosis found.     Plan:  Ultrasound guided cortisone injection to the right knee  We discussed the proper protocols after the injection such as no submerging pools, baths tubs, or hot tubs for 24 hr.  Showering is okay today.  We also discussed that blood sugars can be elevated after an injection and asked patient to properly checked her sugars over the next few days and contact their PCP if there are any concerns.  We discussed red flags such as fevers, chills, red, warm, tender joint at the area of injection to please seek medical care immediately.    Follow up as needed    Follow-up: as needed.    Thank you for choosing [facility] and Dr. Nick Syed for your orthopedic & sports medicine care. It is our goal to provide you with exceptional care that will help keep you healthy, active, and get you back in the game.    Please do not hesitate to reach out to us via email, phone, or MyChart with any questions, concerns, or feedback.    If you felt that you received exemplary care today, please consider leaving us feedback on [employer] at:  [email or web address]    If you are experiencing pain/discomfort ,or have questions after 5pm and would like to be connected to the [facility] on-call team, please call this number and specify which Sports Medicine provider is treating you: [phone]

## 2025-02-20 NOTE — PROGRESS NOTES
Patient ID: Alea Terrazas  YOB: 1955  MRN: [id number]    Chief Complaint: Pain of the Right Knee    History of Present Illness: Alea Terrazas is a right-hand dominant 69 y.o. female who presents today with right knee pain followup  Patient last seen in clinic on 11/13/2024 and received CSI to the right knee.  Reports that injection was helpful and that pain returned approximately one month ago. She received back surgery on October 16 and had some complications following this.  Reports knee pain started around that time.  Currently rates her pain at a 5/10 and describes the pain as a constant pain.  Has been using ice, Meloxicam and currently taking Percocet.  She is interested in repeating CSI today.         11/13/2024 Interval History of Present Illness: Alea Terrazas is a right-hand dominant 69 y.o. female who presents today with right knee pain.  Patient last seen in clinic on 4/19/2024 and received CSI to the right knee.  Reports that injection was helpful and that pain returned approximately one month ago. She received back surgery on October 16 and had some complications following this.  Reports knee pain started around that time.  Currently rates her pain at a 5/10 and describes the pain as a constant pain.  Has been using ice, Meloxicam and currently taking Percocet.  She is interested in repeating CSI today.     4/19/2024 Interval History of Present Illness: Alea Terrazas is a right-hand dominant 67 y.o. female who presents today with followup  right knee pain She is just coming back off a cruise, she was unable to do a lot of walking while on the cruise.   Pain is worse with activity, she is not interested in surgery, she would like to try conservative treatment for now.. Putting any kind of pressure on right leg hurts worse. She has a hx of a L TKA done at an outside facility. She is complaining of pain and swelling in both knees. Pain 5/10, achy, throbbing.     11/15/2023 Interval  History of Present Illness: Alea Terrazas is a right-hand dominant 68 y.o. female who presents today with right knee pain waxing and weaning for years. Pain is worse with activity, she is not interested in surgery, she would like to try conservative treatment for now.. She has a hx of a L TKA done at an outside facility. Pain 8/10, achy, throbbing. Has not tried PT.     The patient is active in none.  Occupation:       Past Medical History:   Past Medical History:   Diagnosis Date    Anticoagulant long-term use     Anxiety     Arthritis     Carpal tunnel syndrome     Chronic neck and back pain     Hypertension      Past Surgical History:   Procedure Laterality Date    CARPAL TUNNEL RELEASE Left     CARPAL TUNNEL RELEASE Right 09/05/2019    Procedure: RELEASE, CARPAL TUNNEL;  Surgeon: Billy Meyer MD;  Location: [facility] OR;  Service: Orthopedics;  Laterality: Right;  Confirmed anesthesia type with CRNA.    CARPAL TUNNEL RELEASE Left 11/2/2023    Procedure: RELEASE, CARPAL TUNNEL;  Surgeon: Billy Meyer MD;  Location: [facility] OR;  Service: Orthopedics;  Laterality: Left;    CERVICAL FUSION  2016, 2017    HERNIA REPAIR      JOINT REPLACEMENT Left     knee    ROTATOR CUFF REPAIR Right 2015    TRIGGER FINGER RELEASE Right 09/05/2019    Procedure: RELEASE, TRIGGER FINGER;  Surgeon: Billy Meyer MD;  Location: [facility] OR;  Service: Orthopedics;  Laterality: Right;  right thumb   Confirmed anesthesia type with CRNA.    ULNAR NERVE TRANSPOSITION Left 11/2/2023    Procedure: TRANSPOSITION, NERVE, ULNAR;  Surgeon: Billy Meyer MD;  Location: [facility] OR;  Service: Orthopedics;  Laterality: Left;    ULNAR TUNNEL RELEASE Right 10/01/2020    Procedure: RELEASE, CUBITAL TUNNEL;  Surgeon: Billy Meyer MD;  Location: [facility] OR;  Service: Orthopedics;  Laterality: Right;  lower arm (near elbow)  Per MARK Kent CRNA, general anesthesia used.  Per surgeon, anterior transposition ulnar nerve was NOT  peformed.      ULNAR TUNNEL RELEASE Left 11/2/2023    Procedure: RELEASE, CUBITAL TUNNEL;  Surgeon: Billy Meyer MD;  Location: [facility];  Service: Orthopedics;  Laterality: Left;     Family History   Problem Relation Name Age of Onset    Cancer Father      Diabetes Father      Heart failure Father      COPD Mother  41    Kidney failure Mother      No Known Problems Brother      No Known Problems Brother       Social History     Socioeconomic History    Marital status:    Tobacco Use    Smoking status: Some Days     Current packs/day: 0.50     Average packs/day: 0.5 packs/day for 41.0 years (20.5 ttl pk-yrs)     Types: Cigarettes    Smokeless tobacco: Never   Substance and Sexual Activity    Alcohol use: Not Currently     Comment: occasionally    Drug use: No     Social Drivers of Health     Financial Resource Strain: Medium Risk (11/12/2024)    Overall Financial Resource Strain (CARDIA)     Difficulty of Paying Living Expenses: Somewhat hard   Food Insecurity: Food Insecurity Present (11/12/2024)    Hunger Vital Sign     Worried About Running Out of Food in the Last Year: Sometimes true     Ran Out of Food in the Last Year: Sometimes true   Physical Activity: Insufficiently Active (11/12/2024)    Exercise Vital Sign     Days of Exercise per Week: 5 days     Minutes of Exercise per Session: 20 min   Stress: No Stress Concern Present (11/12/2024)    [employer] [place] [employer] - Occupational Stress Questionnaire     Feeling of Stress : Not at all   Housing Stability: High Risk (11/12/2024)    Housing Stability Vital Sign     Unable to Pay for Housing in the Last Year: Yes     Medication List with Changes/Refills   Current Medications    ALBUTEROL (PROVENTIL/VENTOLIN HFA) 90 MCG/ACTUATION INHALER    INHALE 1 TO 2 PUFFS BY MOUTH INTO THE LUNGS EVERY 4-6 HOURS AS NEEDED    AQUANAZ 10- MG TAB    Take 1 tablet by mouth every 6 (six) hours as needed. Hold 3 days prior to surgery     CELECOXIB (CELEBREX) 200 MG CAPSULE    Take 200 mg by mouth.    CETIRIZINE (ZYRTEC) 10 MG TABLET    Take 10 mg by mouth once daily.     CHOLECALCIFEROL, VITAMIN D3, 1,250 MCG (50,000 UNIT) CAPSULE    Take 50,000 Units by mouth every 7 days.    CYCLOBENZAPRINE (FLEXERIL) 10 MG TABLET    Take 10 mg by mouth 3 (three) times daily as needed.     ERGOCALCIFEROL (ERGOCALCIFEROL) 50,000 UNIT CAP    Take 50,000 Units by mouth every 7 days.    FLUTICASONE (FLONASE) 50 MCG/ACTUATION NASAL SPRAY    2 sprays by Each Nare route once daily.    GABAPENTIN (NEURONTIN) 300 MG CAPSULE    Take 300 mg by mouth nightly as needed.     GABAPENTIN ENACARBIL 600 MG TBSR    Take 600 mg by mouth.    HYDROCHLOROTHIAZIDE (HYDRODIURIL) 25 MG TABLET    Take 25 mg by mouth once daily.    LISINOPRIL-HYDROCHLOROTHIAZIDE (PRINZIDE,ZESTORETIC) 20-12.5 MG PER TABLET    Take by mouth.    MELOXICAM (MOBIC) 15 MG TABLET    Take 15 mg by mouth once daily. Hold 4 days prior to surgery    OXYCODONE-ACETAMINOPHEN (PERCOCET)  MG PER TABLET    Take 1 tablet by mouth every 6 (six) hours as needed for Pain.    OXYCODONE-ACETAMINOPHEN (PERCOCET) 5-325 MG PER TABLET    Take 1 tablet by mouth 2 (two) times daily.     PANTOPRAZOLE (PROTONIX) 40 MG TABLET    Take 40 mg by mouth 2 (two) times daily.    TRIAMTERENE-HYDROCHLOROTHIAZIDE 37.5-25 MG (DYAZIDE) 37.5-25 MG PER CAPSULE    Dyazide Take capsule 1 time per day No date recorded capsule 1 time per day No route recorded No set duration recorded No set duration amount recorded suspended 37.5-25 mg     Review of patient's allergies indicates:   Allergen Reactions    Iodine Other (See Comments)     Severe Headache    Codeine Palpitations       Physical Exam:   Body mass index is 26.48 kg/m².    GENERAL: Well appearing, in no acute distress.  HEAD: Normocephalic and atraumatic.  ENT: External ears and nose grossly normal.  EYES: EOMI bilaterally  PULMONARY: Respirations are grossly even and non-labored.  NEURO:  Awake, alert, and oriented x 3.  SKIN: No obvious rashes appreciated.  PSYCH: Mood & affect are appropriate.    Detailed MSK exam:     Right knee exam:   -ROM: extension 0, flexion 130  -TTP: Medial joint line and Lateral joint line  -effusion: none  -Patellar apprehension negative  -Mason test negative  -stable to varus and valgus stress tests  -Lachman test negative, anterior drawer test negative, posterior drawer test negative        Imaging:  X-ray Knee Ortho Right with Flexion (XPD)  Narrative: EXAMINATION:  XR KNEE ORTHO RIGHT WITH FLEXION (XPD)    CLINICAL HISTORY:  Pain in right knee    TECHNIQUE:  AP standing as well as PA flexion standing and Merchant views of both knees were performed.  A lateral view of the right knee is also performed.    COMPARISON:  11/15/2023    FINDINGS:  Left knee total arthroplasty unchanged.  Right knee similar appearance without acute abnormality.  Degenerative findings most noted within the lateral compartment including joint space loss.  No large joint effusion.  Arterial vascular calcifications present.  Impression: As above    Electronically signed by: Humphrey Curry MD  Date:    02/20/2025  Time:    13:26        Relevant imaging results were reviewed and interpreted by me and per my read shows lateral compartment joint space narrowing right knee.  This was discussed with the patient and / or family today.     Assessment:  Alea Terrazas is a 69 y.o. female following up for right knee pain. Steroid injection has worked very well for her and is interested in repeating again today.   Plan: Steroid injection given today (see separate procedure note for details). We discussed the proper protocols after the injection such as no submerging pools, baths tubs, or hot tubs for 24 hr.  Showering is okay today.  We also discussed that blood sugars can be elevated after an injection and asked patient to properly checked her sugars over the next few days and contact their PCP if  there are any concerns.  We discussed red flags such as fevers, chills, red, warm, tender joint at the area of injection to please seek medical care immediately.   Consider gel injection if not improving. Continue conservative management for pain.   Follow up as needed. All questions answered.     Primary osteoarthritis of right knee  -     Sports Medicine US - Guidance for Needle Placement  -     Large Joint Aspiration/Injection: R knee    Right knee pain, unspecified chronicity  -     Sports Medicine US - Guidance for Needle Placement           Ultrasound guidance was used for needle localization. Images were saved and stored for documentation. The appropriate structures were visualized. Dynamic visualization of the needle was continuous throughout the procedures and maintained good position.     MEDICAL NECESSITY FOR VISCOSUPPLEMENTATION: After thorough evaluation of the patient, I have determined that visco-supplementation is medically necessary. The patient has painful degenerative changes of the knee with failure of conservative treatments including lifestyle modifications and rehabilitation exercises.  Oral analgesis/NSAIDs have not adequately controlled symptoms and there is radiographic evidence of Kellgren Elliott grade 2 or greater osteoarthritic changes, or in lack of radiographic evidence, there is arthroscopic or other evidence of chondrosis.      Electronically signed:  Nick Syed MD, MPH  02/20/2025  1:02 PM

## 2025-02-20 NOTE — PROCEDURES
Large Joint Aspiration/Injection: R knee    Date/Time: 2/20/2025 1:20 PM    Performed by: Nick Syed MD  Authorized by: Nick Syed MD    Consent Done?:  Yes (Verbal)  Indications:  Arthritis and pain  Site marked: the procedure site was marked    Timeout: prior to procedure the correct patient, procedure, and site was verified    Prep: patient was prepped and draped in usual sterile fashion    Local anesthetic:  Bupivacaine 0.5% without epinephrine and lidocaine 1% without epinephrine    Details:  Needle Size:  21 G  Ultrasonic Guidance for needle placement?: Yes    Images are saved and documented.  Approach:  Lateral (superior)  Location:  Knee  Site:  R knee  Medications:  40 mg triamcinolone acetonide 40 mg/mL  Patient tolerance:  Patient tolerated the procedure well with no immediate complications     Ultrasound guidance was used for needle localization. Images were saved and stored for documentation. The appropriate structures were visualized. Dynamic visualization of the needle was continuous throughout the procedures and maintained good position.

## 2025-02-20 NOTE — PROCEDURES
Sports Medicine US - Guidance for Needle Placement    Date/Time: 2/20/2025 1:20 PM    Performed by: Nick Syed MD  Authorized by: Nick Syed MD  Preparation: Patient was prepped and draped in the usual sterile fashion.  Local anesthesia used: no    Anesthesia:  Local anesthesia used: no    Sedation:  Patient sedated: no    Patient tolerance: patient tolerated the procedure well with no immediate complications  Comments: Ultrasound guidance was used for needle localization. Images were saved and stored for documentation. The appropriate structures were visualized. Dynamic visualization of the needle was continuous throughout the procedures and maintained good position.

## 2025-03-26 ENCOUNTER — OFFICE VISIT (OUTPATIENT)
Dept: ORTHOPEDICS | Facility: CLINIC | Age: 70
End: 2025-03-26
Payer: MEDICARE

## 2025-03-26 VITALS — WEIGHT: 169.06 LBS | HEIGHT: 67 IN | BODY MASS INDEX: 26.53 KG/M2

## 2025-03-26 DIAGNOSIS — G56.20 CUBITAL TUNNEL SYNDROME, UNSPECIFIED LATERALITY: ICD-10-CM

## 2025-03-26 DIAGNOSIS — M54.12 CERVICAL RADICULOPATHY: ICD-10-CM

## 2025-03-26 DIAGNOSIS — Z98.890 S/P CUBITAL TUNNEL RELEASE: ICD-10-CM

## 2025-03-26 DIAGNOSIS — G56.03 CARPAL TUNNEL SYNDROME ON BOTH SIDES: Primary | ICD-10-CM

## 2025-03-26 DIAGNOSIS — Z98.890 S/P CARPAL TUNNEL RELEASE: Primary | ICD-10-CM

## 2025-03-26 PROCEDURE — 1101F PT FALLS ASSESS-DOCD LE1/YR: CPT | Mod: CPTII,S$GLB,, | Performed by: ORTHOPAEDIC SURGERY

## 2025-03-26 PROCEDURE — 1160F RVW MEDS BY RX/DR IN RCRD: CPT | Mod: CPTII,S$GLB,, | Performed by: ORTHOPAEDIC SURGERY

## 2025-03-26 PROCEDURE — 1125F AMNT PAIN NOTED PAIN PRSNT: CPT | Mod: CPTII,S$GLB,, | Performed by: ORTHOPAEDIC SURGERY

## 2025-03-26 PROCEDURE — 3008F BODY MASS INDEX DOCD: CPT | Mod: CPTII,S$GLB,, | Performed by: ORTHOPAEDIC SURGERY

## 2025-03-26 PROCEDURE — 99213 OFFICE O/P EST LOW 20 MIN: CPT | Mod: S$GLB,,, | Performed by: ORTHOPAEDIC SURGERY

## 2025-03-26 PROCEDURE — 1159F MED LIST DOCD IN RCRD: CPT | Mod: CPTII,S$GLB,, | Performed by: ORTHOPAEDIC SURGERY

## 2025-03-26 PROCEDURE — 3288F FALL RISK ASSESSMENT DOCD: CPT | Mod: CPTII,S$GLB,, | Performed by: ORTHOPAEDIC SURGERY

## 2025-03-26 PROCEDURE — 99999 PR PBB SHADOW E&M-EST. PATIENT-LVL III: CPT | Mod: PBBFAC,,, | Performed by: ORTHOPAEDIC SURGERY

## 2025-03-26 NOTE — PROGRESS NOTES
Subjective:     Patient ID: Alea Terrazas is a 69 y.o. female.    Chief Complaint: Pain of the Right Hand and Pain of the Right Wrist      HPI:  The patient is a 69-year-old female status post left carpal tunnel release 11/02/2023.  She is status post right carpal tunnel release and right trigger thumb release 09/05/2019.  She is status post right cubital tunnel release 10/01/2020 she had nerve conduction studies 08/02/2020 that showed a cervical radiculopathy of C8-T1.  She does get neck injections with a pain management physician at the [facility].  She was last injected both carpal tunnels 11/09/2024 with good relief.  Her main problem at this point seems to be her neck particularly on the left side with a radiculopathy into the ring and small finger.  She would seem to be due for another nerve conduction study for further delineation of the etiology of her symptoms.  She has had a previous cervical fusion.    Past Medical History:   Diagnosis Date    Anticoagulant long-term use     Anxiety     Arthritis     Carpal tunnel syndrome     Chronic neck and back pain     Hypertension      Past Surgical History:   Procedure Laterality Date    CARPAL TUNNEL RELEASE Left     CARPAL TUNNEL RELEASE Right 09/05/2019    Procedure: RELEASE, CARPAL TUNNEL;  Surgeon: Billy Meyer MD;  Location: [facility] OR;  Service: Orthopedics;  Laterality: Right;  Confirmed anesthesia type with CRNA.    CARPAL TUNNEL RELEASE Left 11/2/2023    Procedure: RELEASE, CARPAL TUNNEL;  Surgeon: Billy Meyer MD;  Location: [facility] OR;  Service: Orthopedics;  Laterality: Left;    CERVICAL FUSION  2016, 2017    HERNIA REPAIR      JOINT REPLACEMENT Left     knee    ROTATOR CUFF REPAIR Right 2015    TRIGGER FINGER RELEASE Right 09/05/2019    Procedure: RELEASE, TRIGGER FINGER;  Surgeon: Billy Meyer MD;  Location: [facility] OR;  Service: Orthopedics;  Laterality: Right;  right thumb   Confirmed anesthesia type with CRNA.     ULNAR NERVE TRANSPOSITION Left 11/2/2023    Procedure: TRANSPOSITION, NERVE, ULNAR;  Surgeon: Billy Meyer MD;  Location: [facility] OR;  Service: Orthopedics;  Laterality: Left;    ULNAR TUNNEL RELEASE Right 10/01/2020    Procedure: RELEASE, CUBITAL TUNNEL;  Surgeon: Billy Meyer MD;  Location: [facility] OR;  Service: Orthopedics;  Laterality: Right;  lower arm (near elbow)  Per MARK Kent CRNA, general anesthesia used.  Per surgeon, anterior transposition ulnar nerve was NOT peformed.      ULNAR TUNNEL RELEASE Left 11/2/2023    Procedure: RELEASE, CUBITAL TUNNEL;  Surgeon: Billy Meyer MD;  Location: [facility] OR;  Service: Orthopedics;  Laterality: Left;     Family History   Problem Relation Name Age of Onset    Cancer Father      Diabetes Father      Heart failure Father      COPD Mother  41    Kidney failure Mother      No Known Problems Brother      No Known Problems Brother       Social History[1]  Medication List with Changes/Refills   Current Medications    ALBUTEROL (PROVENTIL/VENTOLIN HFA) 90 MCG/ACTUATION INHALER    INHALE 1 TO 2 PUFFS BY MOUTH INTO THE LUNGS EVERY 4-6 HOURS AS NEEDED    AQUANAZ 10- MG TAB    Take 1 tablet by mouth every 6 (six) hours as needed. Hold 3 days prior to surgery    CELECOXIB (CELEBREX) 200 MG CAPSULE    Take 200 mg by mouth.    CETIRIZINE (ZYRTEC) 10 MG TABLET    Take 10 mg by mouth once daily.     CHOLECALCIFEROL, VITAMIN D3, 1,250 MCG (50,000 UNIT) CAPSULE    Take 50,000 Units by mouth every 7 days.    CYCLOBENZAPRINE (FLEXERIL) 10 MG TABLET    Take 10 mg by mouth 3 (three) times daily as needed.     ERGOCALCIFEROL (ERGOCALCIFEROL) 50,000 UNIT CAP    Take 50,000 Units by mouth every 7 days.    FLUTICASONE (FLONASE) 50 MCG/ACTUATION NASAL SPRAY    2 sprays by Each Nare route once daily.    GABAPENTIN (NEURONTIN) 300 MG CAPSULE    Take 300 mg by mouth nightly as needed.     GABAPENTIN ENACARBIL 600 MG TBSR    Take 600 mg by mouth.    HYDROCHLOROTHIAZIDE  (HYDRODIURIL) 25 MG TABLET    Take 25 mg by mouth once daily.    LISINOPRIL-HYDROCHLOROTHIAZIDE (PRINZIDE,ZESTORETIC) 20-12.5 MG PER TABLET    Take by mouth.    MELOXICAM (MOBIC) 15 MG TABLET    Take 15 mg by mouth once daily. Hold 4 days prior to surgery    OXYCODONE-ACETAMINOPHEN (PERCOCET)  MG PER TABLET    Take 1 tablet by mouth every 6 (six) hours as needed for Pain.    OXYCODONE-ACETAMINOPHEN (PERCOCET) 5-325 MG PER TABLET    Take 1 tablet by mouth 2 (two) times daily.     PANTOPRAZOLE (PROTONIX) 40 MG TABLET    Take 40 mg by mouth 2 (two) times daily.    TRIAMTERENE-HYDROCHLOROTHIAZIDE 37.5-25 MG (DYAZIDE) 37.5-25 MG PER CAPSULE    Dyazide Take capsule 1 time per day No date recorded capsule 1 time per day No route recorded No set duration recorded No set duration amount recorded suspended 37.5-25 mg     Review of patient's allergies indicates:   Allergen Reactions    Iodine Other (See Comments)     Severe Headache    Codeine Palpitations     Review of Systems   Constitutional: Negative for malaise/fatigue.   HENT:  Negative for hearing loss.    Eyes:  Negative for double vision and visual disturbance.   Cardiovascular:  Negative for chest pain.   Respiratory:  Negative for shortness of breath.    Endocrine: Negative for cold intolerance.   Hematologic/Lymphatic: Does not bruise/bleed easily.   Skin:  Negative for poor wound healing and suspicious lesions.   Musculoskeletal:  Positive for back pain and neck pain. Negative for gout, joint pain and joint swelling.   Gastrointestinal:  Negative for nausea and vomiting.   Genitourinary:  Negative for dysuria.   Neurological:  Positive for numbness, paresthesias and sensory change.   Psychiatric/Behavioral:  Positive for substance abuse. Negative for depression and memory loss. The patient is not nervous/anxious.    Allergic/Immunologic: Negative for persistent infections.       Objective:   Body mass index is 26.48 kg/m².  There were no vitals filed for  this visit.             General    Constitutional: She is oriented to person, place, and time. She appears well-developed and well-nourished. No distress.   HENT:   Head: Normocephalic. Mouth/Throat: Oropharynx is clear and moist.   Eyes: EOM are normal.   Cardiovascular:  Normal rate.            Pulmonary/Chest: Effort normal.   Abdominal: Soft.   Neurological: She is alert and oriented to person, place, and time. No cranial nerve deficit.   Psychiatric: She has a normal mood and affect.         Back (L-Spine & T-Spine) / Neck (C-Spine) Exam     Comments:  The patient has left-sided paracervical tenderness with radicular symptoms into the ring and small finger.      Right Hand/Wrist Exam     Inspection   Scars: Wrist - present Hand -  present  Effusion: Wrist - absent Hand -  absent    Comments:  The patient has well-healed carpal tunnel and cubital tunnel scars.      Left Hand/Wrist Exam     Inspection   Scars: Wrist - present Hand -  present  Effusion: Wrist - absent Hand -  absent    Comments:  The patient has well-healed carpal tunnel and cubital scars.           radiographs were not obtained today  Assessment:     Encounter Diagnoses   Name Primary?    S/P carpal tunnel release Yes    Cervical radiculopathy     S/P cubital tunnel release     Cervical radiculopathy    Plan:     The patient is sent for repeat nerve conduction studies.  She was sent with a copy of her old nerve conduction studies from 08/02/2020 to see her pain management doctor at [facility].  He has been doing neck injections.  We will see if her symptoms are peripheral or more from her neck with repeat nerve conduction studies.  She was last injected 02/13/2025 and her neck with limited improvement.                Disclaimer: This note was prepared using a voice recognition system and is likely to have sound alike errors within the text.          [1]   Social History  Socioeconomic History    Marital status:    Tobacco Use     Smoking status: Some Days     Current packs/day: 0.50     Average packs/day: 0.5 packs/day for 41.0 years (20.5 ttl pk-yrs)     Types: Cigarettes    Smokeless tobacco: Never   Substance and Sexual Activity    Alcohol use: Not Currently     Comment: occasionally    Drug use: No     Social Drivers of Health     Financial Resource Strain: Medium Risk (11/12/2024)    Overall Financial Resource Strain (CARDIA)     Difficulty of Paying Living Expenses: Somewhat hard   Food Insecurity: Food Insecurity Present (11/12/2024)    Hunger Vital Sign     Worried About Running Out of Food in the Last Year: Sometimes true     Ran Out of Food in the Last Year: Sometimes true   Physical Activity: Insufficiently Active (11/12/2024)    Exercise Vital Sign     Days of Exercise per Week: 5 days     Minutes of Exercise per Session: 20 min   Stress: No Stress Concern Present (11/12/2024)    [employer] [place] [employer] - Occupational Stress Questionnaire     Feeling of Stress : Not at all   Housing Stability: High Risk (11/12/2024)    Housing Stability Vital Sign     Unable to Pay for Housing in the Last Year: Yes

## 2025-05-08 ENCOUNTER — OFFICE VISIT (OUTPATIENT)
Dept: SPORTS MEDICINE | Facility: CLINIC | Age: 70
End: 2025-05-08
Payer: MEDICARE

## 2025-05-08 VITALS — BODY MASS INDEX: 26.53 KG/M2 | HEIGHT: 67 IN | WEIGHT: 169 LBS

## 2025-05-08 DIAGNOSIS — M17.11 PRIMARY OSTEOARTHRITIS OF RIGHT KNEE: Primary | ICD-10-CM

## 2025-05-08 PROCEDURE — 99999 PR PBB SHADOW E&M-EST. PATIENT-LVL IV: CPT | Mod: PBBFAC,,, | Performed by: STUDENT IN AN ORGANIZED HEALTH CARE EDUCATION/TRAINING PROGRAM

## 2025-05-08 RX ORDER — TRIAMCINOLONE ACETONIDE 40 MG/ML
40 INJECTION, SUSPENSION INTRA-ARTICULAR; INTRAMUSCULAR
Status: DISCONTINUED | OUTPATIENT
Start: 2025-05-08 | End: 2025-05-08 | Stop reason: HOSPADM

## 2025-05-08 RX ADMIN — TRIAMCINOLONE ACETONIDE 40 MG: 40 INJECTION, SUSPENSION INTRA-ARTICULAR; INTRAMUSCULAR at 02:05

## 2025-05-08 NOTE — PROCEDURES
Large Joint Aspiration/Injection: R knee    Date/Time: 5/8/2025 2:20 PM    Performed by: Nick Syed MD  Authorized by: Nick Syed MD    Consent Done?:  Yes (Verbal)  Indications:  Arthritis and pain  Site marked: the procedure site was marked    Timeout: prior to procedure the correct patient, procedure, and site was verified    Prep: patient was prepped and draped in usual sterile fashion    Local anesthetic:  Bupivacaine 0.5% without epinephrine and lidocaine 1% without epinephrine    Details:  Needle Size:  21 G  Ultrasonic Guidance for needle placement?: Yes    Images are saved and documented.  Approach:  Lateral (superior)  Location:  Knee  Site:  R knee  Medications:  40 mg triamcinolone acetonide 40 mg/mL  Patient tolerance:  Patient tolerated the procedure well with no immediate complications     Ultrasound guidance was used for needle localization. Images were saved and stored for documentation. The appropriate structures were visualized. Dynamic visualization of the needle was continuous throughout the procedures and maintained good position.

## 2025-05-08 NOTE — PROGRESS NOTES
Patient ID: Alea Terrazas  YOB: 1955  MRN: [id number]    Chief Complaint: Pain and Swelling of the Right Knee      History of Present Illness: Alea Terrazas is a right-hand dominant 69 y.o. female who presents today with pain and swelling in the right knee.  Last visit was on 2/20/2025 and received cortisone injection to right knee.  Rated pain at a 3-4/10 today and states that over the last several days the pain was a 10/10 due to the weather.  Patient going to [place] tomorrow.  Reports that cortisone injection on February 20 lasted approximately one month.  Interested in exploring joint replacement surgery.  Reports that she is having more instances of giving way when turning quickly and that she almost fell out her back door a few weeks ago but was able to catch herself.  Believes that he knee is swollen and can feel a palpable knot in her popliteal fossa.  Currently using ice, heat and compression brace for symptoms.  Interested in discussing another injection and referral for surgical consult.     2/20/2025 Interval History of Present Illness: Alea Terrazas is a right-hand dominant 69 y.o. female who presents today with right knee pain followup  Patient last seen in clinic on 11/13/2024 and received CSI to the right knee.  Reports that injection was helpful and that pain returned approximately one month ago. She received back surgery on October 16 and had some complications following this.  Reports knee pain started around that time.  Currently rates her pain at a 5/10 and describes the pain as a constant pain.  Has been using ice, Meloxicam and currently taking Percocet.  She is interested in repeating CSI today.     11/13/2024 Interval History of Present Illness: Alea Terrazas is a right-hand dominant 69 y.o. female who presents today with right knee pain.  Patient last seen in clinic on 4/19/2024 and received CSI to the right knee.  Reports that injection was helpful and that pain  returned approximately one month ago. She received back surgery on October 16 and had some complications following this.  Reports knee pain started around that time.  Currently rates her pain at a 5/10 and describes the pain as a constant pain.  Has been using ice, Meloxicam and currently taking Percocet.  She is interested in repeating CSI today.     4/19/2024 Interval History of Present Illness: Alea Terrazas is a right-hand dominant 67 y.o. female who presents today with followup  right knee pain She is just coming back off a cruise, she was unable to do a lot of walking while on the cruise.   Pain is worse with activity, she is not interested in surgery, she would like to try conservative treatment for now.. Putting any kind of pressure on right leg hurts worse. She has a hx of a L TKA done at an outside facility. She is complaining of pain and swelling in both knees. Pain 5/10, achy, throbbing.     11/15/2023 Interval History of Present Illness: Alea Terrazas is a right-hand dominant 68 y.o. female who presents today with right knee pain waxing and weaning for years. Pain is worse with activity, she is not interested in surgery, she would like to try conservative treatment for now.. She has a hx of a L TKA done at an outside facility. Pain 8/10, achy, throbbing. Has not tried PT.        The patient is active in none.  Occupation: Retired      Past Medical History:   Past Medical History:   Diagnosis Date    Anticoagulant long-term use     Anxiety     Arthritis     Carpal tunnel syndrome     Chronic neck and back pain     Hypertension      Past Surgical History:   Procedure Laterality Date    CARPAL TUNNEL RELEASE Left     CARPAL TUNNEL RELEASE Right 09/05/2019    Procedure: RELEASE, CARPAL TUNNEL;  Surgeon: Billy Meyer MD;  Location: [facility];  Service: Orthopedics;  Laterality: Right;  Confirmed anesthesia type with CRNA.    CARPAL TUNNEL RELEASE Left 11/2/2023    Procedure: RELEASE, CARPAL TUNNEL;   Surgeon: Billy Meyer MD;  Location: [facility];  Service: Orthopedics;  Laterality: Left;    CERVICAL FUSION  2016, 2017    HERNIA REPAIR      JOINT REPLACEMENT Left     knee    ROTATOR CUFF REPAIR Right 2015    TRIGGER FINGER RELEASE Right 09/05/2019    Procedure: RELEASE, TRIGGER FINGER;  Surgeon: Billy Meyer MD;  Location: [facility];  Service: Orthopedics;  Laterality: Right;  right thumb   Confirmed anesthesia type with CRNA.    ULNAR NERVE TRANSPOSITION Left 11/2/2023    Procedure: TRANSPOSITION, NERVE, ULNAR;  Surgeon: Billy Meyer MD;  Location: [facility];  Service: Orthopedics;  Laterality: Left;    ULNAR TUNNEL RELEASE Right 10/01/2020    Procedure: RELEASE, CUBITAL TUNNEL;  Surgeon: Billy Meyer MD;  Location: [facility];  Service: Orthopedics;  Laterality: Right;  lower arm (near elbow)  Per MARK Kent, CRNA, general anesthesia used.  Per surgeon, anterior transposition ulnar nerve was NOT peformed.      ULNAR TUNNEL RELEASE Left 11/2/2023    Procedure: RELEASE, CUBITAL TUNNEL;  Surgeon: Billy Meyer MD;  Location: [facility];  Service: Orthopedics;  Laterality: Left;     Family History   Problem Relation Name Age of Onset    Cancer Father      Diabetes Father      Heart failure Father      COPD Mother  41    Kidney failure Mother      No Known Problems Brother      No Known Problems Brother       Social History[1]  Medication List with Changes/Refills   Current Medications    ALBUTEROL (PROVENTIL/VENTOLIN HFA) 90 MCG/ACTUATION INHALER    INHALE 1 TO 2 PUFFS BY MOUTH INTO THE LUNGS EVERY 4-6 HOURS AS NEEDED    AQUANAZ 10- MG TAB    Take 1 tablet by mouth every 6 (six) hours as needed. Hold 3 days prior to surgery    CELECOXIB (CELEBREX) 200 MG CAPSULE    Take 200 mg by mouth.    CETIRIZINE (ZYRTEC) 10 MG TABLET    Take 10 mg by mouth once daily.     CHOLECALCIFEROL, VITAMIN D3, 1,250 MCG (50,000 UNIT) CAPSULE    Take 50,000 Units by mouth every 7 days.     CYCLOBENZAPRINE (FLEXERIL) 10 MG TABLET    Take 10 mg by mouth 3 (three) times daily as needed.     ERGOCALCIFEROL (ERGOCALCIFEROL) 50,000 UNIT CAP    Take 50,000 Units by mouth every 7 days.    FLUTICASONE (FLONASE) 50 MCG/ACTUATION NASAL SPRAY    2 sprays by Each Nare route once daily.    GABAPENTIN (NEURONTIN) 300 MG CAPSULE    Take 300 mg by mouth nightly as needed.     GABAPENTIN ENACARBIL 600 MG TBSR    Take 600 mg by mouth.    HYDROCHLOROTHIAZIDE (HYDRODIURIL) 25 MG TABLET    Take 25 mg by mouth once daily.    LISINOPRIL-HYDROCHLOROTHIAZIDE (PRINZIDE,ZESTORETIC) 20-12.5 MG PER TABLET    Take by mouth.    MELOXICAM (MOBIC) 15 MG TABLET    Take 15 mg by mouth once daily. Hold 4 days prior to surgery    OXYCODONE-ACETAMINOPHEN (PERCOCET)  MG PER TABLET    Take 1 tablet by mouth every 6 (six) hours as needed for Pain.    OXYCODONE-ACETAMINOPHEN (PERCOCET) 5-325 MG PER TABLET    Take 1 tablet by mouth 2 (two) times daily.     PANTOPRAZOLE (PROTONIX) 40 MG TABLET    Take 40 mg by mouth 2 (two) times daily.    TRIAMTERENE-HYDROCHLOROTHIAZIDE 37.5-25 MG (DYAZIDE) 37.5-25 MG PER CAPSULE    Dyazide Take capsule 1 time per day No date recorded capsule 1 time per day No route recorded No set duration recorded No set duration amount recorded suspended 37.5-25 mg     Review of patient's allergies indicates:   Allergen Reactions    Iodine Other (See Comments)     Severe Headache    Codeine Palpitations       Physical Exam:   Body mass index is 26.47 kg/m².    GENERAL: Well appearing, in no acute distress.  HEAD: Normocephalic and atraumatic.  ENT: External ears and nose grossly normal.  EYES: EOMI bilaterally  PULMONARY: Respirations are grossly even and non-labored.  NEURO: Awake, alert, and oriented x 3.  SKIN: No obvious rashes appreciated.  PSYCH: Mood & affect are appropriate.    Detailed MSK exam:     Right knee exam:   -ROM: extension 0, flexion 110  -TTP: Medial joint line and Lateral joint line  -effusion:  trace  -Patellar apprehension negative  -Mason test negative  -stable to varus and valgus stress tests  -Lachman test negative, anterior drawer test negative, posterior drawer test negative      Imaging:  Sports Medicine US - Guidance for Needle Placement  Nick Syed MD     2/20/2025  2:47 PM  Sports Medicine US - Guidance for Needle Placement    Date/Time: 2/20/2025 1:20 PM    Performed by: Nick Syed MD  Authorized by: Nick Syed MD  Preparation: Patient was prepped and   draped in the usual sterile fashion.  Local anesthesia used: no    Anesthesia:  Local anesthesia used: no    Sedation:  Patient sedated: no    Patient tolerance: patient tolerated the procedure well with no immediate   complications  Comments: Ultrasound guidance was used for needle localization. Images   were saved and stored for documentation. The appropriate structures were   visualized. Dynamic visualization of the needle was continuous throughout   the procedures and maintained good position.   X-ray Knee Ortho Right with Flexion (XPD)  Narrative: EXAMINATION:  XR KNEE ORTHO RIGHT WITH FLEXION (XPD)    CLINICAL HISTORY:  Pain in right knee    TECHNIQUE:  AP standing as well as PA flexion standing and Merchant views of both knees were performed.  A lateral view of the right knee is also performed.    COMPARISON:  11/15/2023    FINDINGS:  Left knee total arthroplasty unchanged.  Right knee similar appearance without acute abnormality.  Degenerative findings most noted within the lateral compartment including joint space loss.  No large joint effusion.  Arterial vascular calcifications present.  Impression: As above    Electronically signed by: Humphrey Curry MD  Date:    02/20/2025  Time:    13:26        Relevant imaging results were reviewed and interpreted by me and per my read shows moderate arthritic changes right knee, left TKA.  This was discussed with the patient and / or family today.     Assessment:   Alea Terrazas is a 69 y.o. female following up for right knee pain. Last injection only lasted 1 month but wants to repeat today because she is going to [facility] tomorrow.   Plan: Steroid injection given today (see separate procedure note for details). We discussed the proper protocols after the injection such as no submerging pools, baths tubs, or hot tubs for 24 hr.  Showering is okay today.  We also discussed that blood sugars can be elevated after an injection and asked patient to properly checked her sugars over the next few days and contact their PCP if there are any concerns.  We discussed red flags such as fevers, chills, red, warm, tender joint at the area of injection to please seek medical care immediately.   Knee replacement referral. Patient understands knee replacement surgery would have to be 3 months after today d/t steroid injection.   Follow up with knee specialist. All questions answered.     Primary osteoarthritis of right knee  -     Sports Medicine US - Guidance for Needle Placement  -     Large Joint Aspiration/Injection: R knee         Ultrasound guidance was used for needle localization. Images were saved and stored for documentation. The appropriate structures were visualized. Dynamic visualization of the needle was continuous throughout the procedures and maintained good position.      MEDICAL NECESSITY FOR VISCOSUPPLEMENTATION: After thorough evaluation of the patient, I have determined that visco-supplementation is medically necessary. The patient has painful degenerative changes of the knee with failure of conservative treatments including lifestyle modifications and rehabilitation exercises.  Oral analgesis/NSAIDs have not adequately controlled symptoms and there is radiographic evidence of Kellgren Elliott grade 2 or greater osteoarthritic changes, or in lack of radiographic evidence, there is arthroscopic or other evidence of chondrosis.       Electronically signed:  Nick Syed,  MD, MPH  05/08/2025  2:24 PM         [1]   Social History  Socioeconomic History    Marital status:    Tobacco Use    Smoking status: Some Days     Current packs/day: 0.50     Average packs/day: 0.5 packs/day for 41.0 years (20.5 ttl pk-yrs)     Types: Cigarettes    Smokeless tobacco: Never   Substance and Sexual Activity    Alcohol use: Not Currently     Comment: occasionally    Drug use: No     Social Drivers of Health     Financial Resource Strain: Low Risk  (5/1/2025)    Overall Financial Resource Strain (CARDIA)     Difficulty of Paying Living Expenses: Not very hard   Food Insecurity: No Food Insecurity (5/1/2025)    Hunger Vital Sign     Worried About Running Out of Food in the Last Year: Never true     Ran Out of Food in the Last Year: Never true   Transportation Needs: No Transportation Needs (5/1/2025)    PRAPARE - Transportation     Lack of Transportation (Medical): No     Lack of Transportation (Non-Medical): No   Physical Activity: Insufficiently Active (5/1/2025)    Exercise Vital Sign     Days of Exercise per Week: 1 day     Minutes of Exercise per Session: 10 min   Stress: No Stress Concern Present (5/1/2025)    [employer] - Occupational Stress Questionnaire     Feeling of Stress : Not at all   Housing Stability: High Risk (5/1/2025)    Housing Stability Vital Sign     Unable to Pay for Housing in the Last Year: Yes     Number of Times Moved in the Last Year: 0     Homeless in the Last Year: No

## 2025-05-08 NOTE — PATIENT INSTRUCTIONS
Assessment:  Alea Terrazas is a 69 y.o. female   Chief Complaint   Patient presents with    Right Knee - Pain, Swelling       No diagnosis found.     Plan:  Ultrasound guided cortisone, with possible aspiration, to the right knee  We discussed the proper protocols after the injection such as no submerging pools, baths tubs, or hot tubs for 24 hr.  Showering is okay today.  We also discussed that blood sugars can be elevated after an injection and asked patient to properly checked her sugars over the next few days and contact their PCP if there are any concerns.  We discussed red flags such as fevers, chills, red, warm, tender joint at the area of injection to please seek medical care immediately.    Follow up as needed    Follow-up: as needed.    Thank you for choosing [facility] and Dr. Nick Syed for your orthopedic & sports medicine care. It is our goal to provide you with exceptional care that will help keep you healthy, active, and get you back in the game.    Please do not hesitate to reach out to us via email, phone, or MyChart with any questions, concerns, or feedback.    If you felt that you received exemplary care today, please consider leaving us feedback on [employer] at:  [email or web address]    If you are experiencing pain/discomfort ,or have questions after 5pm and would like to be connected to the [facility] on-call team, please call this number and specify which Sports Medicine provider is treating you: [phone]

## 2025-05-08 NOTE — PROCEDURES
Sports Medicine US - Guidance for Needle Placement    Date/Time: 5/8/2025 2:20 PM    Performed by: Nick Syed MD  Authorized by: Nick Syed MD  Preparation: Patient was prepped and draped in the usual sterile fashion.  Local anesthesia used: no    Anesthesia:  Local anesthesia used: no    Sedation:  Patient sedated: no    Patient tolerance: patient tolerated the procedure well with no immediate complications  Comments: Ultrasound guidance was used for needle localization. Images were saved and stored for documentation. The appropriate structures were visualized. Dynamic visualization of the needle was continuous throughout the procedures and maintained good position.

## 2025-05-21 ENCOUNTER — TELEPHONE (OUTPATIENT)
Dept: ORTHOPEDICS | Facility: CLINIC | Age: 70
End: 2025-05-21
Payer: MEDICARE

## 2025-05-21 NOTE — TELEPHONE ENCOUNTER
Called the patient in regards to their appointment on 8/11/25 with Dr. Johnston. Informed the patient that I need to reschedule their appointment due to the fact that Dr. Johnston will be out of the office that week. I got the patient reschedule for 6/23/25 at 8:40. Patient verbalized understanding.

## 2025-06-23 ENCOUNTER — OFFICE VISIT (OUTPATIENT)
Dept: ORTHOPEDICS | Facility: CLINIC | Age: 70
End: 2025-06-23
Payer: MEDICARE

## 2025-06-23 VITALS — WEIGHT: 169.06 LBS | HEIGHT: 67 IN | BODY MASS INDEX: 26.53 KG/M2

## 2025-06-23 DIAGNOSIS — M21.062 ACQUIRED VALGUS DEFORMITY KNEE, LEFT: ICD-10-CM

## 2025-06-23 DIAGNOSIS — M54.16 SPINAL STENOSIS OF LUMBAR REGION WITH RADICULOPATHY: ICD-10-CM

## 2025-06-23 DIAGNOSIS — M48.061 SPINAL STENOSIS OF LUMBAR REGION WITH RADICULOPATHY: ICD-10-CM

## 2025-06-23 DIAGNOSIS — M17.12 ARTHRITIS OF KNEE, LEFT: Primary | ICD-10-CM

## 2025-06-23 DIAGNOSIS — Z96.652 HISTORY OF TOTAL LEFT KNEE REPLACEMENT: ICD-10-CM

## 2025-06-23 PROBLEM — D63.1 ANEMIA IN CHRONIC KIDNEY DISEASE: Chronic | Status: ACTIVE | Noted: 2023-04-03

## 2025-06-23 PROBLEM — I12.9 RENAL HYPERTENSION: Status: ACTIVE | Noted: 2025-05-15

## 2025-06-23 PROBLEM — M54.32 SCIATICA, LEFT SIDE: Status: ACTIVE | Noted: 2025-06-23

## 2025-06-23 PROBLEM — J30.9 ALLERGIC RHINITIS: Status: ACTIVE | Noted: 2025-05-15

## 2025-06-23 PROBLEM — G40.909 SEIZURE DISORDER: Status: ACTIVE | Noted: 2025-06-23

## 2025-06-23 PROBLEM — N18.9 ANEMIA IN CHRONIC KIDNEY DISEASE: Chronic | Status: ACTIVE | Noted: 2023-04-03

## 2025-06-23 PROBLEM — R89.4 ABNORMAL IMMUNOLOGICAL FINDINGS IN SPECIMENS FROM OTHER ORGANS, SYSTEMS AND TISSUES: Status: ACTIVE | Noted: 2023-04-03

## 2025-06-23 PROBLEM — N18.31 STAGE 3A CHRONIC KIDNEY DISEASE: Status: ACTIVE | Noted: 2023-04-03

## 2025-06-23 PROBLEM — K21.9 GASTROESOPHAGEAL REFLUX DISEASE: Status: ACTIVE | Noted: 2025-06-23

## 2025-06-23 PROBLEM — G64 DISORDER OF PERIPHERAL NERVOUS SYSTEM: Status: ACTIVE | Noted: 2025-06-23

## 2025-06-23 PROBLEM — N25.81 SECONDARY HYPERPARATHYROIDISM OF RENAL ORIGIN: Chronic | Status: ACTIVE | Noted: 2023-04-03

## 2025-06-23 PROCEDURE — 1101F PT FALLS ASSESS-DOCD LE1/YR: CPT | Mod: CPTII,S$GLB,, | Performed by: ORTHOPAEDIC SURGERY

## 2025-06-23 PROCEDURE — 3008F BODY MASS INDEX DOCD: CPT | Mod: CPTII,S$GLB,, | Performed by: ORTHOPAEDIC SURGERY

## 2025-06-23 PROCEDURE — 1160F RVW MEDS BY RX/DR IN RCRD: CPT | Mod: CPTII,S$GLB,, | Performed by: ORTHOPAEDIC SURGERY

## 2025-06-23 PROCEDURE — 99215 OFFICE O/P EST HI 40 MIN: CPT | Mod: S$GLB,,, | Performed by: ORTHOPAEDIC SURGERY

## 2025-06-23 PROCEDURE — 99999 PR PBB SHADOW E&M-EST. PATIENT-LVL III: CPT | Mod: PBBFAC,,, | Performed by: ORTHOPAEDIC SURGERY

## 2025-06-23 PROCEDURE — 3288F FALL RISK ASSESSMENT DOCD: CPT | Mod: CPTII,S$GLB,, | Performed by: ORTHOPAEDIC SURGERY

## 2025-06-23 PROCEDURE — 1125F AMNT PAIN NOTED PAIN PRSNT: CPT | Mod: CPTII,S$GLB,, | Performed by: ORTHOPAEDIC SURGERY

## 2025-06-23 PROCEDURE — 1159F MED LIST DOCD IN RCRD: CPT | Mod: CPTII,S$GLB,, | Performed by: ORTHOPAEDIC SURGERY

## 2025-06-23 RX ORDER — ASPIRIN 81 MG/1
81 TABLET ORAL DAILY
COMMUNITY

## 2025-06-23 NOTE — PATIENT INSTRUCTIONS
Your x-ray show that you have severe arthritis bone-on-bone on the outside of her knee   You becoming knock-kneed and is severely painful   You did received numerous injections they barely lasting you 2 weeks   You are taking meloxicam every other day because you have stage 3 kidney disease and you do take Percocet given to you [facility]  You are with back issues and you are received lumbar surgery last year as well as in the past neck surgery  You have the left total knee done by Dr. Navarro and that is doing well very rarely you get some discomfort with weather changes which is quite common was total knee replacements     You had enough with the right knee you are at a point that you want a proceed with a right total knee replacement     total knee or total hip replacement is considered outpatient surgery by the government right now.  You will have your surgery under spinal or general anesthetic.    It will take roughly an hour and half to 2 to perform.  You will be going home the same day after surgery.    We will get you up and walking an hour or so after surgery in recovery room and will arrange for home health and physical therapy to come to your house by the next day.    You will receive home health and home physical therapy for 2 weeks and then outpatient after that.   You would need to have family members to help you at home otherwise you can not have surgery because this is considered outpatient by the government   It will take roughly 3-6 months for complete healing to occur  Most joint replacements studies have shown improvement up to 18 months after surgery  There is a mandatory class that you have to attend prior to surgery where you will be given instructions by therapist, nurse, home health  Cardiac clearance prior to having surgery/the heart doctor will decide your risks involved having surgery and if you can withstand the operation      Procedure, common  risks and benefits,alternatives discussed in details.  All questions answered.  Patient expressed understanding.  Patient instructed to call for any questions that could arise in the future.     Most common Risks:  Infection/2%  Leg-length discrepancy/most common with total hip replacement  Dislocation/most common with total hip replacement/2% risk of the hip coming out of the socket.  If that happens you would need to have it pulled back under sedation.  If this continues to happen recurrent dislocation you may need repeated surgery  Neuro-vascular injury ( resulting in loss of motor and sensory functions)  you have a slight increase risk of having what we call peroneal nerve palsy which results in a footdrop.  70-80% of foot drops recuperate within 6 months.  Almost all people with total knee replacement are slightly numb on the outside of the knee.    Pain  Blood clot  Fat clot  Loss of motion.  Total knees have the tendency to lose motion if you do not exercise and do therapy.  You have to work through the pain to achieve motion  Fracture of bone  Failure of procedure to achieve its intended purpose.  Total knees are 80% successful in decreasing pain increasing function.  Total hips are 90% successful in decreasing pain and increasing function  Failure of hardware/they last approximately 15 years/prosthesis is made out of metal and plastic they could wear out with time  Non-union or mal-union of bone  Malalignment  Death/you go see cardiologist before surgery      Patient instructions for joint replacement    Before surgery  1.  Shower with Hibiclens soap/antibacterial for 3 days prior to surgery to decrease risk of infection  2.  Stop all blood thinners/aspirin, Coumadin, warfarin, Plavix, Eliquis, Xarelto etc 5 days prior to surgery  3.  No eating or drinking after midnight before surgery. Would like you to drink a bottle of Powerade, Gatorade, or Pedialyte the day before surgery prior to midnight, so that you  do not get dehydrated waiting for surgery the next day.  4.  Take Tylenol 650 mg the night prior to surgery.  5.  Stop all semaglutides (Monjauro, Ozempic, Trulicity, Wegovy, etc.). 10 days prior to surgery.  6. Stop all NSAIDs (Motrin, ibuprofen, Aleve, Mobic/mellitus, Celebrex, Voltaren, nabumetone, Relafen, sulindac etc.), all natural products, and vitamins except vitamin D for 7-10 days prior to surgery.    Ask physicians for prescription of Celebrex or Mobic if needed    After surgery at home  1.  Take Tylenol 650 mg 3 times a day for 14 days then as needed for mild pain.  You may resume all your home medications the 1st day after surgery  2.  Take gabapentin 300 mg nightly for 6 weeks/if you are on pregabalin or Lyrica you can use that instead  3.  Take Celebrex 200 mg or meloxicam 15 mg daily-you may substitute with Aleve 2 tablets twice a day with food or ibuprofen 600 mg twice a day with food or any other anti-inflammatory if you were on prior to surgery for 6 weeks unless having cardiac issues to take for 2 weeks only  4.  Must take aspirin 81 mg twice a day for 6 weeks unless you are on other blood thinners/Plavix, Eliquis, Xarelto, Coumadin etc to start the next day after surgery  5.  Must wear compressive stockings for 6 weeks minimum to decrease the risk of blood clot and swelling  6.  Hydrocodone/Norco or oxycodone/Percocet will be prescribed to take every 6 hr as needed for breakthrough pain/you may cut pills in half.  You will also have something for nausea to take on as needed will be prescribed  7.  May apply ice on the knee to help with decreasing pain  8.  Keep wound dry for 2 weeks  than you will be allowed to shower in 24 hr and get the wound wet.  No soaking of the wound in the tub or swimming for 4 weeks after surgery  9.  No driving for 4 weeks unless specified by physician  10.  Avoid touching the wound or surrounding area /at least 2 inches on each side of the surgical incision until  staples are removed/stitches   11.  May change the surgical dressing if extremely bloody or has drainage on it. May clean the wound with peroxide or Betadine and apply sterile dressing and Ace wrap over it  12.  Leave hospital dressing on for 3 days then may change by applying sterile 4 x 4 and Ace wrap after cleaning with Betadine or peroxide.  May leave this dressing change to home health nurses    Proceed with right total knee replacement/ Depuy  We will have you see Cardiology for clearance   We will have you go to the class  You already have a walker at home that you would need after surgery for 4 weeks

## 2025-06-25 NOTE — PROGRESS NOTES
Subjective:     Patient ID: Alea Terrazas is a 69 y.o. female.    Chief Complaint: Pain of the Right Knee and Pain of the Left Knee    HPI:  06/23/2025   Left knee mild discomfort which is occasional status post left TKA by Dr. Navarro.  Patient stated it is doing okay it is not bothering her as much   Right knee severe pain over the last couple years.  Had received numerous injections by other providers and they will last barely 2 weeks last injection lasted 2 weeks of Kenalog.  She had recently lumbar surgery last year at [facility] and she sees pain management Dr. Rodriguez .  She does have some kidney issues and she does take meloxicam every other day that was told to her.  She had tried Celebrex in the past.  She is taking gabapentin and cyclobenzaprine.  She has been through therapy for her back and her lower extremities on numerous occasions.  The right knee shaunna on her when she walks when she squats unable to do it stairs are painful.  Her pain is 4-5/10.  Now she will not like to have her right knee replaced.  We went over her x-ray today went over the electronic records and reviewed it in details.  She did use assistive devices.  She feels that her right leg is going out so much and it is becoming very uncomfortable that the knee touching the other side.  Spent a long time today going over her x-ray in examining her and going over the electronic records and the findings and discussing total knees replacement how it is this time around and what the expectation to be  No fever no chills no shortness of breath or difficulty with chewing or swallowing loss of bowel bladder control or no loss of visual disturbance in so blurry eyes blurry vision  She does have back issues    Past Medical History:   Diagnosis Date    Anticoagulant long-term use     Anxiety     Arthritis     Carpal tunnel syndrome     Chronic neck and back pain     Hypertension     Seizure disorder 6/23/2025    Seizure disorder (Problem)      Past Surgical History:   Procedure Laterality Date    CARPAL TUNNEL RELEASE Left     CARPAL TUNNEL RELEASE Right 09/05/2019    Procedure: RELEASE, CARPAL TUNNEL;  Surgeon: Billy Meyer MD;  Location: [facility] OR;  Service: Orthopedics;  Laterality: Right;  Confirmed anesthesia type with CRNA.    CARPAL TUNNEL RELEASE Left 11/2/2023    Procedure: RELEASE, CARPAL TUNNEL;  Surgeon: Billy Meyer MD;  Location: [facility] OR;  Service: Orthopedics;  Laterality: Left;    CERVICAL FUSION  2016, 2017    HERNIA REPAIR      JOINT REPLACEMENT Left     knee    ROTATOR CUFF REPAIR Right 2015    TRIGGER FINGER RELEASE Right 09/05/2019    Procedure: RELEASE, TRIGGER FINGER;  Surgeon: Billy Meyer MD;  Location: [facility] OR;  Service: Orthopedics;  Laterality: Right;  right thumb   Confirmed anesthesia type with CRNA.    ULNAR NERVE TRANSPOSITION Left 11/2/2023    Procedure: TRANSPOSITION, NERVE, ULNAR;  Surgeon: Billy Meyer MD;  Location: [facility];  Service: Orthopedics;  Laterality: Left;    ULNAR TUNNEL RELEASE Right 10/01/2020    Procedure: RELEASE, CUBITAL TUNNEL;  Surgeon: Billy Meyer MD;  Location: [facility] OR;  Service: Orthopedics;  Laterality: Right;  lower arm (near elbow)  Per MARK Kent CRNA, general anesthesia used.  Per surgeon, anterior transposition ulnar nerve was NOT peformed.      ULNAR TUNNEL RELEASE Left 11/2/2023    Procedure: RELEASE, CUBITAL TUNNEL;  Surgeon: Billy Meyer MD;  Location: [facility];  Service: Orthopedics;  Laterality: Left;     Family History   Problem Relation Name Age of Onset    Cancer Father      Diabetes Father      Heart failure Father      COPD Mother  41    Kidney failure Mother      No Known Problems Brother      No Known Problems Brother       Social History[1]  Medication List with Changes/Refills   Current Medications    ALBUTEROL (PROVENTIL/VENTOLIN HFA) 90 MCG/ACTUATION INHALER    INHALE 1 TO 2 PUFFS BY MOUTH INTO THE LUNGS EVERY  4-6 HOURS AS NEEDED    AQUANAZ 10- MG TAB    Take 1 tablet by mouth every 6 (six) hours as needed. Hold 3 days prior to surgery    ASPIRIN (ECOTRIN) 81 MG EC TABLET    Take 81 mg by mouth once daily.    CETIRIZINE (ZYRTEC) 10 MG TABLET    Take 10 mg by mouth once daily.     CYCLOBENZAPRINE (FLEXERIL) 10 MG TABLET    Take 10 mg by mouth 3 (three) times daily as needed.     FLUTICASONE (FLONASE) 50 MCG/ACTUATION NASAL SPRAY    2 sprays by Each Nare route once daily.    GABAPENTIN (NEURONTIN) 300 MG CAPSULE    Take 300 mg by mouth nightly as needed.     LINACLOTIDE (LINZESS) 290 MCG CAP CAPSULE    Take 290 mcg by mouth.    MELOXICAM (MOBIC) 15 MG TABLET    Take 15 mg by mouth once daily. Hold 4 days prior to surgery    OXYCODONE-ACETAMINOPHEN (PERCOCET)  MG PER TABLET    Take 1 tablet by mouth every 6 (six) hours as needed for Pain.    PANTOPRAZOLE (PROTONIX) 40 MG TABLET    Take 40 mg by mouth 2 (two) times daily.    TRIAMTERENE-HYDROCHLOROTHIAZIDE 37.5-25 MG (DYAZIDE) 37.5-25 MG PER CAPSULE    Dyazide Take capsule 1 time per day No date recorded capsule 1 time per day No route recorded No set duration recorded No set duration amount recorded suspended 37.5-25 mg   Discontinued Medications    CELECOXIB (CELEBREX) 200 MG CAPSULE    Take 200 mg by mouth.    CHOLECALCIFEROL, VITAMIN D3, 1,250 MCG (50,000 UNIT) CAPSULE    Take 50,000 Units by mouth every 7 days.    ERGOCALCIFEROL (ERGOCALCIFEROL) 50,000 UNIT CAP    Take 50,000 Units by mouth every 7 days.    GABAPENTIN ENACARBIL 600 MG TBSR    Take 600 mg by mouth.    HYDROCHLOROTHIAZIDE (HYDRODIURIL) 25 MG TABLET    Take 25 mg by mouth once daily.    LISINOPRIL-HYDROCHLOROTHIAZIDE (PRINZIDE,ZESTORETIC) 20-12.5 MG PER TABLET    Take by mouth.    OXYCODONE-ACETAMINOPHEN (PERCOCET) 5-325 MG PER TABLET    Take 1 tablet by mouth 2 (two) times daily.      Review of patient's allergies indicates:   Allergen Reactions    Iodine Other (See Comments)     Severe  Headache    Codeine Palpitations     Review of Systems   Constitutional: Negative for decreased appetite.   HENT:  Negative for tinnitus.    Eyes:  Negative for double vision.   Cardiovascular:  Negative for chest pain.   Respiratory:  Negative for wheezing.    Hematologic/Lymphatic: Negative for bleeding problem.   Skin:  Negative for dry skin.   Musculoskeletal:  Positive for arthritis, back pain, joint pain and joint swelling. Negative for gout, neck pain and stiffness.   Gastrointestinal:  Negative for abdominal pain.   Genitourinary:  Negative for bladder incontinence.   Neurological:  Negative for numbness, paresthesias and sensory change.   Psychiatric/Behavioral:  Negative for altered mental status.        Objective:   Body mass index is 26.48 kg/m².  There were no vitals filed for this visit.       General    Constitutional: She is oriented to person, place, and time. She appears well-developed.   HENT:   Head: Atraumatic.   Eyes: EOM are normal.   Pulmonary/Chest: Effort normal.   Neurological: She is alert and oriented to person, place, and time.   Psychiatric: Judgment normal.           Ambulating without assistive devices but with a slight limp   Getting up from sitting position uses the upper extremity to push   Pelvis is level   Bilateral hips range of motion is within normal limits.  No pain in the groin.    Hip flexors and abductors and adductors and quads and hamstrings and ankle extensors and flexors were all 5/5   Right knee with around 15° of valgus deformity when you have it in extension.  Severe pain lateral joint line.  Crepitus with motion.  No defect in the patella or quadriceps tendon.  MCL slightly loose compared to the LCL.  Anterior drawer has been negative.  Active motion 0-120 degrees.  Mild to moderate swelling.  Left knee TKA surgical incision healed well.  Has 0-120 degrees of flexion.  Stable in extension and flexion.  No swelling no effusion.  No defect in the patella or  quadriceps tendon  Calves are soft nontender was multiple varicosities   Ankle extension and flexion on the right is with a normal limits as well around the left   Skin is warm to touch with multiple tattoos throughout upper and lower extremity.  She did cover her total knee incision with the tattoo.    Relevant imaging results reviewed and interpreted by me, discussed with the patient and / or family today     X-ray 02/20/2025 revealed that she has a left TKA looks like Depuy you rotating platform in good alignment no evidence of failure.  The right knee with severe arthritis and loss of joint space bone-on-bone on the lateral side of the joint consistent with valgus deformity also.  She has some punctate calcification in the popliteal vessel.  Assessment:     Encounter Diagnoses   Name Primary?    Arthritis of knee, left Yes    Acquired valgus deformity knee, left     History of total left knee replacement     Spinal stenosis of lumbar region with radiculopathy         Plan:   Arthritis of knee, left    Acquired valgus deformity knee, left    History of total left knee replacement    Spinal stenosis of lumbar region with radiculopathy         Patient Instructions   Your x-ray show that you have severe arthritis bone-on-bone on the outside of her knee   You becoming knock-kneed and is severely painful   You did received numerous injections they barely lasting you 2 weeks   You are taking meloxicam every other day because you have stage 3 kidney disease and you do take Percocet given to you by [facility]  You are with back issues and you are received lumbar surgery last year as well as in the past neck surgery  You have the left total knee done by Dr. Navarro and that is doing well very rarely you get some discomfort with weather changes which is quite common was total knee replacements     You had enough with the right knee you are at a point that you want a proceed with a  right total knee replacement     total knee or total hip replacement is considered outpatient surgery by the government right now.  You will have your surgery under spinal or general anesthetic.    It will take roughly an hour and half to 2 to perform.  You will be going home the same day after surgery.    We will get you up and walking an hour or so after surgery in recovery room and will arrange for home health and physical therapy to come to your house by the next day.    You will receive home health and home physical therapy for 2 weeks and then outpatient after that.   You would need to have family members to help you at home otherwise you can not have surgery because this is considered outpatient by the government   It will take roughly 3-6 months for complete healing to occur  Most joint replacements studies have shown improvement up to 18 months after surgery  There is a mandatory class that you have to attend prior to surgery where you will be given instructions by therapist, nurse, home health  Cardiac clearance prior to having surgery/the heart doctor will decide your risks involved having surgery and if you can withstand the operation      Procedure, common risks and benefits,alternatives discussed in details.  All questions answered.  Patient expressed understanding.  Patient instructed to call for any questions that could arise in the future.     Most common Risks:  Infection/2%  Leg-length discrepancy/most common with total hip replacement  Dislocation/most common with total hip replacement/2% risk of the hip coming out of the socket.  If that happens you would need to have it pulled back under sedation.  If this continues to happen recurrent dislocation you may need repeated surgery  Neuro-vascular injury ( resulting in loss of motor and sensory functions)  you have a slight increase risk of having what we call peroneal nerve palsy which results in a footdrop.  70-80% of foot drops recuperate within  6 months.  Almost all people with total knee replacement are slightly numb on the outside of the knee.    Pain  Blood clot  Fat clot  Loss of motion.  Total knees have the tendency to lose motion if you do not exercise and do therapy.  You have to work through the pain to achieve motion  Fracture of bone  Failure of procedure to achieve its intended purpose.  Total knees are 80% successful in decreasing pain increasing function.  Total hips are 90% successful in decreasing pain and increasing function  Failure of hardware/they last approximately 15 years/prosthesis is made out of metal and plastic they could wear out with time  Non-union or mal-union of bone  Malalignment  Death/you go see cardiologist before surgery      Patient instructions for joint replacement    Before surgery  1.  Shower with Hibiclens soap/antibacterial for 3 days prior to surgery to decrease risk of infection  2.  Stop all blood thinners/aspirin, Coumadin, warfarin, Plavix, Eliquis, Xarelto etc 5 days prior to surgery  3.  No eating or drinking after midnight before surgery. Would like you to drink a bottle of Powerade, Gatorade, or Pedialyte the day before surgery prior to midnight, so that you do not get dehydrated waiting for surgery the next day.  4.  Take Tylenol 650 mg the night prior to surgery.  5.  Stop all semaglutides (Monjauro, Ozempic, Trulicity, Wegovy, etc.). 10 days prior to surgery.  6. Stop all NSAIDs (Motrin, ibuprofen, Aleve, Mobic/mellitus, Celebrex, Voltaren, nabumetone, Relafen, sulindac etc.), all natural products, and vitamins except vitamin D for 7-10 days prior to surgery.    Ask physicians for prescription of Celebrex or Mobic if needed    After surgery at home  1.  Take Tylenol 650 mg 3 times a day for 14 days then as needed for mild pain.  You may resume all your home medications the 1st day after surgery  2.  Take gabapentin 300 mg nightly for 6 weeks/if you are on pregabalin or Lyrica you can use that  instead  3.  Take Celebrex 200 mg or meloxicam 15 mg daily-you may substitute with Aleve 2 tablets twice a day with food or ibuprofen 600 mg twice a day with food or any other anti-inflammatory if you were on prior to surgery for 6 weeks unless having cardiac issues to take for 2 weeks only  4.  Must take aspirin 81 mg twice a day for 6 weeks unless you are on other blood thinners/Plavix, Eliquis, Xarelto, Coumadin etc to start the next day after surgery  5.  Must wear compressive stockings for 6 weeks minimum to decrease the risk of blood clot and swelling  6.  Hydrocodone/Norco or oxycodone/Percocet will be prescribed to take every 6 hr as needed for breakthrough pain/you may cut pills in half.  You will also have something for nausea to take on as needed will be prescribed  7.  May apply ice on the knee to help with decreasing pain  8.  Keep wound dry for 2 weeks  than you will be allowed to shower in 24 hr and get the wound wet.  No soaking of the wound in the tub or swimming for 4 weeks after surgery  9.  No driving for 4 weeks unless specified by physician  10.  Avoid touching the wound or surrounding area /at least 2 inches on each side of the surgical incision until staples are removed/stitches   11.  May change the surgical dressing if extremely bloody or has drainage on it. May clean the wound with peroxide or Betadine and apply sterile dressing and Ace wrap over it  12.  Leave hospital dressing on for 3 days then may change by applying sterile 4 x 4 and Ace wrap after cleaning with Betadine or peroxide.  May leave this dressing change to home health nurses    Proceed with right total knee replacement/ Depuy  We will have you see Cardiology for clearance   We will have you go to the class  You already have a walker at home that you would need after surgery for 4 weeks                                           Disclaimer: This note was prepared using a voice recognition system and is likely to have sound  alike errors within the text.          [1]   Social History  Socioeconomic History    Marital status:    Tobacco Use    Smoking status: Some Days     Current packs/day: 0.50     Average packs/day: 0.5 packs/day for 41.0 years (20.5 ttl pk-yrs)     Types: Cigarettes    Smokeless tobacco: Never   Substance and Sexual Activity    Alcohol use: Not Currently     Comment: occasionally    Drug use: No     Social Drivers of Health     Financial Resource Strain: Low Risk  (5/1/2025)    Overall Financial Resource Strain (CARDIA)     Difficulty of Paying Living Expenses: Not very hard   Food Insecurity: No Food Insecurity (5/1/2025)    Hunger Vital Sign     Worried About Running Out of Food in the Last Year: Never true     Ran Out of Food in the Last Year: Never true   Transportation Needs: No Transportation Needs (5/1/2025)    PRAPARE - Transportation     Lack of Transportation (Medical): No     Lack of Transportation (Non-Medical): No   Physical Activity: Insufficiently Active (5/1/2025)    Exercise Vital Sign     Days of Exercise per Week: 1 day     Minutes of Exercise per Session: 10 min   Stress: No Stress Concern Present (5/1/2025)    [employer] - Occupational Stress Questionnaire     Feeling of Stress : Not at all   Housing Stability: High Risk (5/1/2025)    Housing Stability Vital Sign     Unable to Pay for Housing in the Last Year: Yes     Number of Times Moved in the Last Year: 0     Homeless in the Last Year: No

## 2025-06-30 ENCOUNTER — TELEPHONE (OUTPATIENT)
Dept: ORTHOPEDICS | Facility: CLINIC | Age: 70
End: 2025-06-30
Payer: MEDICARE

## 2025-06-30 NOTE — TELEPHONE ENCOUNTER
Copied from CRM #[id number]. Topic: Appointments - Amb Referral  >> Jun 27, 2025 11:41 AM Melanie wrote:  Patients cardiologist office needing pre op clearance form faxed over to  [phone], so they can complete and fax back over. Pt can be reached at .[phone]  >> Jun 27, 2025  3:34 PM Med Assistant Ned wrote:

## 2025-07-09 ENCOUNTER — TELEPHONE (OUTPATIENT)
Dept: ORTHOPEDICS | Facility: CLINIC | Age: 70
End: 2025-07-09
Payer: MEDICARE

## 2025-07-09 NOTE — TELEPHONE ENCOUNTER
Copied from CRM #[id number]. Topic: General Inquiry - Patient Advice  >> Jul 9, 2025  9:16 AM Dakota wrote:  Type:  Needs Medical Advice    Who Called: romi  Symptoms (please be specific):  surgery schedules   How long has patient had these symptoms:    Pharmacy name and phone #:    Would the patient rather a call back or a response via [facility]? Call back   Best Call Back Number: [phone]  Additional Information: surgery questions and clearance

## 2025-07-09 NOTE — TELEPHONE ENCOUNTER
Returned call - spoke with patient - appt with external cards is on 7/22/25 - stated we will set up a surgery once we get the clearance I will call her and set up the surgery date   Patient verbalized understanding and thankful for call   I also gave patient our fax number so that her heart doctor can fax over clearance

## 2025-08-04 ENCOUNTER — TELEPHONE (OUTPATIENT)
Dept: ORTHOPEDICS | Facility: CLINIC | Age: 70
End: 2025-08-04
Payer: MEDICARE

## 2025-08-04 DIAGNOSIS — Z01.818 PREOPERATIVE EXAMINATION: ICD-10-CM

## 2025-08-04 DIAGNOSIS — M17.11 ARTHRITIS OF RIGHT KNEE: Primary | ICD-10-CM

## 2025-08-04 DIAGNOSIS — Z01.818 PREOP TESTING: ICD-10-CM

## 2025-08-04 NOTE — TELEPHONE ENCOUNTER
Copied from CRM #[id number]. Topic: General Inquiry - Test Results  >> Aug 4, 2025  8:55 AM Che wrote:  Patient is calling for LAB results. Please call patient at [phone]. Thanks KB

## 2025-08-04 NOTE — TELEPHONE ENCOUNTER
Returned call - spoke with patient - surgery set up for 09/09/25 per patients request - all preop and post op appts have been mailed     Verbalized understanding and thankful for call

## 2025-08-11 ENCOUNTER — TELEPHONE (OUTPATIENT)
Dept: ORTHOPEDICS | Facility: CLINIC | Age: 70
End: 2025-08-11
Payer: MEDICARE

## 2025-08-25 ENCOUNTER — TELEPHONE (OUTPATIENT)
Dept: ORTHOPEDICS | Facility: CLINIC | Age: 70
End: 2025-08-25
Payer: MEDICARE

## 2025-08-26 ENCOUNTER — APPOINTMENT (OUTPATIENT)
Dept: ORTHOPEDICS | Facility: HOSPITAL | Age: 70
End: 2025-08-26
Attending: ORTHOPAEDIC SURGERY
Payer: MEDICARE

## 2025-08-27 ENCOUNTER — TELEPHONE (OUTPATIENT)
Dept: ORTHOPEDICS | Facility: CLINIC | Age: 70
End: 2025-08-27
Payer: MEDICARE

## (undated) DEVICE — COVER LIGHT HANDLE 80/CA

## (undated) DEVICE — SUT 4-0 ETHILON 18 PS-2

## (undated) DEVICE — COVER OVERHEAD SURG LT BLUE

## (undated) DEVICE — DRAPE THREE-QTR REINF 53X77IN

## (undated) DEVICE — ALCOHOL 70% ISOP RUBBING 4OZ

## (undated) DEVICE — DRAPE PLASTIC U 60X72

## (undated) DEVICE — COVER CAMERA OPERATING ROOM

## (undated) DEVICE — UNDERGLOVES BIOGEL PI SIZE 7.5

## (undated) DEVICE — DRAPE HAND STERILE

## (undated) DEVICE — DEV-O-LOOPS MAXI RED

## (undated) DEVICE — DRESSING XEROFORM FOIL PK 1X8

## (undated) DEVICE — SUPPORT ULNA NERVE PROTECTOR

## (undated) DEVICE — GLOVE BIOGEL SZ 8 1/2

## (undated) DEVICE — GOWN NONREINF SET-IN SLV 2XL

## (undated) DEVICE — GLOVE SURGICAL LATEX SZ 6.5

## (undated) DEVICE — SCRUB HIBICLENS 4% CHG 4OZ

## (undated) DEVICE — NDL ECLIPSE SAFETY 18GX1-1/2IN

## (undated) DEVICE — SEE MEDLINE ITEM 157131

## (undated) DEVICE — TOURNIQUET SB QC DP 18X4IN

## (undated) DEVICE — SOL 9P NACL IRR PIC IL

## (undated) DEVICE — SEE MEDLINE ITEM 146308

## (undated) DEVICE — SYR 10CC LUER LOCK

## (undated) DEVICE — SEE MEDLINE ITEM 152522

## (undated) DEVICE — PAD CAST SPECIALIST STRL 4

## (undated) DEVICE — GOWN SURG 2XL DISP TIE BACK

## (undated) DEVICE — PAD ABD 8X10 STERILE

## (undated) DEVICE — SEE MEDLINE ITEM 157027

## (undated) DEVICE — GLOVE SURGICAL LATEX SZ 7

## (undated) DEVICE — GOWN POLY REINF BRTH SLV XL

## (undated) DEVICE — APPLICATOR CHLORAPREP ORN 26ML

## (undated) DEVICE — NDL HYPODERMIC BLUNT 18G 1.5IN

## (undated) DEVICE — DRAPE U SPLIT SHEET 54X76IN

## (undated) DEVICE — STAPLER SKIN PROXIMATE WIDE

## (undated) DEVICE — GAUZE SPONGE 4X4 12PLY

## (undated) DEVICE — UNDERGLOVES BIOGEL PI SZ 7 LF

## (undated) DEVICE — NDL SAFETY 25G X 1.5 ECLIPSE

## (undated) DEVICE — UNDERGLOVES BIOGEL PI SIZE 8.5

## (undated) DEVICE — SEE MEDLINE ITEM 157173

## (undated) DEVICE — PAD CAST SPECIALIST STRL 3

## (undated) DEVICE — SEE MEDLINE ITEM 146231

## (undated) DEVICE — TOWEL OR DISP STRL BLUE 4/PK

## (undated) DEVICE — SEE MEDLINE ITEM 157117

## (undated) DEVICE — POSITIONER HEAD DONUT 9IN FOAM

## (undated) DEVICE — BANDAGE ESMARK ELASTIC ST 4X9

## (undated) DEVICE — DRAPE STERI U-SHAPED 47X51IN

## (undated) DEVICE — SPONGE COTTON TRAY 4X4IN

## (undated) DEVICE — PACK BASIC SETUP SC BR

## (undated) DEVICE — SLING ARM BUCKLE CLOSURE LG

## (undated) DEVICE — SYR 3CC LUER LOC

## (undated) DEVICE — UNDERGLOVE BIOGEL PI SZ 6.5 LF

## (undated) DEVICE — MANIFOLD 4 PORT

## (undated) DEVICE — BANDAGE DERMACEA STRETCH 4X1IN

## (undated) DEVICE — SUT VICRYL 2-0 CT-2 VCP269H